# Patient Record
Sex: FEMALE | Race: WHITE | NOT HISPANIC OR LATINO | Employment: OTHER | RURAL
[De-identification: names, ages, dates, MRNs, and addresses within clinical notes are randomized per-mention and may not be internally consistent; named-entity substitution may affect disease eponyms.]

---

## 2020-10-20 RX ORDER — NYSTATIN 100000 [USP'U]/G
POWDER TOPICAL
Qty: 60 G | Refills: 1 | OUTPATIENT
Start: 2020-10-20

## 2021-12-14 NOTE — PROGRESS NOTES
Chief Complaint  Establish Care, Diabetes, Hyperlipidemia, and Hypertension    Suellen Rubin presents today for encounter to re-establish care.  She had been a previous patient of mine at [facility] practice 3 years ago.  Suellen believes her last colonoscopy was done in 2012 at [facility]. Last Mammogram was in 2012 as well. Working on retrieving these to update the chart.  Home glucose readings to range significantly, home readings since October ranged from 100-387 only 4 readings are above 200 and 2 of which were above 300 during this time.    Diabetes  She presents for her initial diabetic visit. She has type 2 diabetes mellitus. There are no hypoglycemic associated symptoms. Pertinent negatives for diabetes include no blurred vision, no chest pain and no fatigue. There are no hypoglycemic complications. There are no diabetic complications. Risk factors for coronary artery disease include diabetes mellitus, hypertension, tobacco exposure, family history, obesity and dyslipidemia. Current diabetic treatment includes oral agent (triple therapy). Her breakfast blood glucose is taken between 7-8 am. Her breakfast blood glucose range is generally 180-200 mg/dl. An ACE inhibitor/angiotensin II receptor blocker is not being taken. She does not see a podiatrist.Eye exam is current.   Hyperlipidemia  This is a chronic problem. The current episode started more than 1 year ago. Exacerbating diseases include diabetes and obesity. Pertinent negatives include no chest pain. Current antihyperlipidemic treatment includes statins. Risk factors for coronary artery disease include diabetes mellitus, obesity, family history, hypertension and dyslipidemia.   Hypertension  This is a chronic problem. The current episode started more than 1 month ago. Pertinent negatives include no blurred vision or chest pain. Risk factors for coronary artery disease include diabetes mellitus, dyslipidemia, obesity and  "post-menopausal state. Current antihypertension treatment includes angiotensin blockers.     Recent PCP stopped hydrocodone and changed to ibuprofen 800 mg bid, not as effective, add tylenol and advil 2, 200 mg tabs mid day    Current Outpatient Medications on File Prior to Visit   Medication Sig   • atorvastatin (LIPITOR) 20 MG tablet    • gabapentin (NEURONTIN) 300 MG capsule    • glimepiride (AMARYL) 4 MG tablet    • ibuprofen (ADVIL,MOTRIN) 800 MG tablet    • Januvia 50 MG tablet    • losartan (COZAAR) 25 MG tablet Take 25 mg by mouth Daily.   • metFORMIN (GLUCOPHAGE) 500 MG tablet Take 500 mg by mouth.   • [DISCONTINUED] vitamin B-12 (CYANOCOBALAMIN) 1000 MCG tablet Take 1,000 mcg by mouth Daily.     No current facility-administered medications on file prior to visit.       Objective   Vital Signs:   /84   Pulse 98   Temp 97.3 °F (36.3 °C)   Resp 18   Ht 157.5 cm (62\")   Wt 103 kg (227 lb 9.6 oz)   SpO2 97%   BMI 41.63 kg/m²       Physical Exam  Vitals and nursing note reviewed.   Constitutional:       General: She is not in acute distress.     Appearance: Normal appearance. She is well-developed. She is obese.   HENT:      Head: Normocephalic and atraumatic.   Eyes:      Conjunctiva/sclera: Conjunctivae normal.      Pupils: Pupils are equal, round, and reactive to light.   Neck:      Thyroid: No thyromegaly.      Vascular: No JVD.   Cardiovascular:      Rate and Rhythm: Normal rate and regular rhythm.      Heart sounds: Normal heart sounds. No murmur heard.      Pulmonary:      Breath sounds: Normal breath sounds. No wheezing or rales.   Musculoskeletal:         General: Normal range of motion.      Cervical back: Normal range of motion.   Lymphadenopathy:      Cervical: No cervical adenopathy.   Skin:     General: Skin is warm and dry.      Findings: No rash.   Neurological:      Mental Status: She is alert and oriented to person, place, and time.   Psychiatric:         Mood and Affect: Mood " normal.         Behavior: Behavior normal.            Office Visit on 12/15/2021   Component Date Value Ref Range Status   • Hemoglobin A1C 12/15/2021 7.8  % Final   • Lot Number 12/15/2021 NA   Final   • Expiration Date 12/15/2021 NA   Final   • Glucose 12/15/2021 185* 70 - 130 mg/dL Final   • Microalbumin, Urine 12/15/2021 50   Final   • Color 12/15/2021 Yellow  Yellow, Straw, Dark Yellow, Devika Final   • Clarity, UA 12/15/2021 Clear  Clear Final   • Glucose, UA 12/15/2021 Negative  Negative, 1000 mg/dL (3+) mg/dL Final   • Bilirubin 12/15/2021 Negative  Negative Final   • Ketones, UA 12/15/2021 Negative  Negative Final   • Specific Gravity  12/15/2021 1.010  1.005 - 1.030 Final   • Blood, UA 12/15/2021 Negative  Negative Final   • pH, Urine 12/15/2021 50.0* 5.0 - 8.0 Final   • Protein, POC 12/15/2021 Trace* Negative mg/dL Final   • Urobilinogen, UA 12/15/2021 Normal  Normal Final   • Leukocytes 12/15/2021 Moderate (2+)* Negative Final   • Nitrite, UA 12/15/2021 Negative  Negative Final             Lab Results   Component Value Date    HGBA1C 7.8 12/15/2021                Assessment and Plan {CC Problem List  Visit Diagnosis  ROS  Review (Popup)  Health Maintenance  Quality  BestPractice  Medications  SmartSets  SnapShot Encounters  Media :23}   Diagnoses and all orders for this visit:    1. Encounter to establish care (Primary)    2. Type 2 diabetes mellitus with hyperglycemia, without long-term current use of insulin (HCC)  -     POC Glycosylated Hemoglobin (Hb A1C)  -     POCT Glucose  -     POCT microalbumin  -     POCT urinalysis dipstick, manual  -     glucose blood (Accu-Chek Guide) test strip; Use as instructed  Dispense: 50 each; Refill: 12    3. Mixed hyperlipidemia  -     Lipid Panel    4. Hypertension, essential  -     Comprehensive Metabolic Panel  -     Lipid Panel  -     CBC & Differential    5. Encounter for screening mammogram for malignant neoplasm of breast  -     Mammo Screening  Bilateral With CAD; Future    6. Postmenopause  -     DEXA Bone Density Axial; Future    7. Chronic midline low back pain without sciatica    8. DDD (degenerative disc disease), lumbar    9. Knee arthropathy    10. B12 deficiency  -     vitamin B-12 (CYANOCOBALAMIN) 1000 MCG tablet; Take 1 tablet by mouth Daily.  Dispense: 30 tablet; Refill: 12  -     Vitamin B12    Diabetes good control continue current medications  Hypertension at goal continue current medications  Hyperlipidemia 20 mg daily, checking lipid panel and will adjust if needed  Strongly encouraged to get Covid vaccination  Chronic pain from lumbar degenerative disc disease and knee arthritis patient is currently taking high-dose ibuprofen on a daily basis, checking renal function advised to use as little as necessary to control pain due to gastrointestinal and renal risk.      Medications Discontinued During This Encounter   Medication Reason   • vitamin B-12 (CYANOCOBALAMIN) 1000 MCG tablet Reorder         Follow Up     Return for Medicare Wellness and 3 months for f/u on diabetes.    Patient was given instructions and counseling regarding her condition or for health maintenance advice. Please see specific information pulled into the AVS if appropriate.

## 2021-12-15 ENCOUNTER — OFFICE VISIT (OUTPATIENT)
Dept: FAMILY MEDICINE CLINIC | Facility: CLINIC | Age: 66
End: 2021-12-15

## 2021-12-15 VITALS
HEIGHT: 62 IN | SYSTOLIC BLOOD PRESSURE: 150 MMHG | OXYGEN SATURATION: 97 % | HEART RATE: 98 BPM | TEMPERATURE: 97.3 F | BODY MASS INDEX: 41.88 KG/M2 | WEIGHT: 227.6 LBS | RESPIRATION RATE: 18 BRPM | DIASTOLIC BLOOD PRESSURE: 84 MMHG

## 2021-12-15 DIAGNOSIS — M17.10 KNEE ARTHROPATHY: ICD-10-CM

## 2021-12-15 DIAGNOSIS — G89.29 CHRONIC MIDLINE LOW BACK PAIN WITHOUT SCIATICA: ICD-10-CM

## 2021-12-15 DIAGNOSIS — E78.2 MIXED HYPERLIPIDEMIA: ICD-10-CM

## 2021-12-15 DIAGNOSIS — I10 HYPERTENSION, ESSENTIAL: ICD-10-CM

## 2021-12-15 DIAGNOSIS — E53.8 B12 DEFICIENCY: ICD-10-CM

## 2021-12-15 DIAGNOSIS — M51.36 DDD (DEGENERATIVE DISC DISEASE), LUMBAR: ICD-10-CM

## 2021-12-15 DIAGNOSIS — Z78.0 POSTMENOPAUSE: ICD-10-CM

## 2021-12-15 DIAGNOSIS — E11.65 TYPE 2 DIABETES MELLITUS WITH HYPERGLYCEMIA, WITHOUT LONG-TERM CURRENT USE OF INSULIN (HCC): ICD-10-CM

## 2021-12-15 DIAGNOSIS — M54.50 CHRONIC MIDLINE LOW BACK PAIN WITHOUT SCIATICA: ICD-10-CM

## 2021-12-15 DIAGNOSIS — Z76.89 ENCOUNTER TO ESTABLISH CARE: Primary | ICD-10-CM

## 2021-12-15 DIAGNOSIS — Z12.31 ENCOUNTER FOR SCREENING MAMMOGRAM FOR MALIGNANT NEOPLASM OF BREAST: ICD-10-CM

## 2021-12-15 PROBLEM — K11.8 PAROTID MASS: Status: ACTIVE | Noted: 2018-04-18

## 2021-12-15 LAB
BILIRUB BLD-MCNC: NEGATIVE MG/DL
CLARITY, POC: CLEAR
COLOR UR: YELLOW
EXPIRATION DATE: NORMAL
GLUCOSE BLDC GLUCOMTR-MCNC: 185 MG/DL (ref 70–130)
GLUCOSE UR STRIP-MCNC: NEGATIVE MG/DL
HBA1C MFR BLD: 7.8 %
KETONES UR QL: NEGATIVE
LEUKOCYTE EST, POC: ABNORMAL
Lab: NORMAL
NITRITE UR-MCNC: NEGATIVE MG/ML
PH UR: 50 [PH] (ref 5–8)
POC MICROALBUMIN URINE: 50
PROT UR STRIP-MCNC: ABNORMAL MG/DL
RBC # UR STRIP: NEGATIVE /UL
SP GR UR: 1.01 (ref 1–1.03)
UROBILINOGEN UR QL: NORMAL

## 2021-12-15 PROCEDURE — 3062F POS MACROALBUMINURIA REV: CPT | Performed by: FAMILY MEDICINE

## 2021-12-15 PROCEDURE — 82962 GLUCOSE BLOOD TEST: CPT | Performed by: FAMILY MEDICINE

## 2021-12-15 PROCEDURE — 83036 HEMOGLOBIN GLYCOSYLATED A1C: CPT | Performed by: FAMILY MEDICINE

## 2021-12-15 PROCEDURE — 99204 OFFICE O/P NEW MOD 45 MIN: CPT | Performed by: FAMILY MEDICINE

## 2021-12-15 PROCEDURE — 81002 URINALYSIS NONAUTO W/O SCOPE: CPT | Performed by: FAMILY MEDICINE

## 2021-12-15 PROCEDURE — 82044 UR ALBUMIN SEMIQUANTITATIVE: CPT | Performed by: FAMILY MEDICINE

## 2021-12-15 PROCEDURE — 3044F HG A1C LEVEL LT 7.0%: CPT | Performed by: FAMILY MEDICINE

## 2021-12-15 RX ORDER — GLIMEPIRIDE 4 MG/1
TABLET ORAL
COMMUNITY
Start: 2021-10-28 | End: 2023-03-13 | Stop reason: SDUPTHER

## 2021-12-15 RX ORDER — SITAGLIPTIN 50 MG/1
TABLET, FILM COATED ORAL
COMMUNITY
Start: 2021-11-16 | End: 2022-03-15 | Stop reason: DRUGHIGH

## 2021-12-15 RX ORDER — BLOOD SUGAR DIAGNOSTIC
STRIP MISCELLANEOUS
Qty: 50 EACH | Refills: 12 | Status: SHIPPED | OUTPATIENT
Start: 2021-12-15 | End: 2022-12-30

## 2021-12-15 RX ORDER — LANOLIN ALCOHOL/MO/W.PET/CERES
1000 CREAM (GRAM) TOPICAL DAILY
COMMUNITY
End: 2021-12-15 | Stop reason: SDUPTHER

## 2021-12-15 RX ORDER — LOSARTAN POTASSIUM 25 MG/1
25 TABLET ORAL DAILY
COMMUNITY
End: 2021-12-20 | Stop reason: SDUPTHER

## 2021-12-15 RX ORDER — ATORVASTATIN CALCIUM 20 MG/1
TABLET, FILM COATED ORAL
COMMUNITY
Start: 2021-11-25

## 2021-12-15 RX ORDER — IBUPROFEN 800 MG/1
TABLET ORAL
COMMUNITY
Start: 2021-11-23 | End: 2022-12-12

## 2021-12-15 RX ORDER — GABAPENTIN 300 MG/1
CAPSULE ORAL
COMMUNITY
Start: 2021-11-16 | End: 2023-03-13 | Stop reason: SDUPTHER

## 2021-12-15 RX ORDER — LANOLIN ALCOHOL/MO/W.PET/CERES
1000 CREAM (GRAM) TOPICAL DAILY
Qty: 30 TABLET | Refills: 12 | Status: SHIPPED | OUTPATIENT
Start: 2021-12-15 | End: 2022-12-30

## 2021-12-15 NOTE — PATIENT INSTRUCTIONS
Chronic Pain, Adult  Chronic pain is a type of pain that lasts or keeps coming back for at least 3-6 months. You may have headaches, pain in the abdomen, or pain in other areas of the body. Chronic pain may be related to an illness, such as fibromyalgia or complex regional pain syndrome. Chronic pain may also be related to an injury or a health condition. Sometimes, the cause of chronic pain is not known.  Chronic pain can make it hard for you to do daily activities. If not treated, chronic pain can lead to anxiety and depression. Treatment depends on the cause and severity of your pain. You may need to work with a pain specialist to come up with a treatment plan. The plan may include medicine, counseling, and physical therapy. Many people benefit from a combination of two or more types of treatment to control their pain.  Follow these instructions at home:  Medicines  · Take over-the-counter and prescription medicines only as told by your health care provider.  · Ask your health care provider if the medicine prescribed to you:  ? Requires you to avoid driving or using machinery.  ? Can cause constipation. You may need to take these actions to prevent or treat constipation:  § Drink enough fluid to keep your urine pale yellow.  § Take over-the-counter or prescription medicines.  § Eat foods that are high in fiber, such as beans, whole grains, and fresh fruits and vegetables.  § Limit foods that are high in fat and processed sugars, such as fried or sweet foods.  Treatment plan  Follow your treatment plan as told by your health care provider. This may include:  · Gentle, regular exercise.  · Eating a healthy diet that includes foods such as vegetables, fruits, fish, and lean meats.  · Cognitive or behavioral therapy that changes the way you think or act in response to the pain. This may help improve how you feel.  · Working with a physical therapist.  · Meditation, yoga, acupuncture, or massage therapy.  · Aroma,  color, light, or sound therapy.  · Local electrical stimulation. The electrical pulses help to relieve pain by temporarily stopping the nerve impulses that cause you to feel pain.  · Injections. These deliver numbing or pain-relieving medicines into the spine or the area of pain.    Lifestyle    · Ask your health care provider whether you should keep a pain diary. Your health care provider will tell you what information to write in the diary. This may include when you have pain, what the pain feels like, and how medicines and other behaviors or treatments help to reduce the pain.  · Consider talking with a mental health care provider about how to manage chronic pain.  · Consider joining a chronic pain support group.  · Try to control or lower your stress levels. Talk with your health care provider about ways to do this.    General instructions  · Learn as much as you can about how to manage your chronic pain. Ask your health care provider if an intensive pain rehabilitation program or a chronic pain specialist would be helpful.  · Check your pain level as told by your health care provider. Ask your health care provider if you should use a pain scale.  · It is up to you to get the results of any tests that were done. Ask your health care provider, or the department that is doing the tests, when your results will be ready.  · Keep all follow-up visits as told by your health care provider. This is important.  Contact a health care provider if:  · Your pain gets worse, or you have new pain.  · You have trouble sleeping.  · You have trouble doing your normal activities.  · Your pain is not controlled with treatment.  · You have side effects from pain medicine.  · You feel weak.  · You notice any other changes that show that your condition is getting worse.  Get help right away if:  · You lose feeling or have numbness in your body.  · You lose control of bowel or bladder function.  · Your pain suddenly gets much  worse.  · You develop shaking or chills.  · You develop confusion.  · You develop chest pain.  · You have trouble breathing or shortness of breath.  · You pass out.  · You have thoughts about hurting yourself or others.  If you ever feel like you may hurt yourself or others, or have thoughts about taking your own life, get help right away. Go to your nearest emergency department or:  · Call your local emergency services (361 in the [place]  · Call a suicide crisis helpline, such as the National Suicide Prevention Lifeline at 1-248.588.4813. This is open 24 hours a day in the [place]  · Text the Crisis Text Line at 271309 (in the [place]  Summary  · Chronic pain is a type of pain that lasts or keeps coming back for at least 3-6 months.  · Chronic pain may be related to an illness, injury, or other health condition. Sometimes, the cause of chronic pain is not known.  · Treatment depends on the cause and severity of your pain.  · Many people benefit from a combination of two or more types of treatment to control their pain.  · Follow your treatment plan as told by your health care provider.  This information is not intended to replace advice given to you by your health care provider. Make sure you discuss any questions you have with your health care provider.  Document Revised: 09/03/2020 Document Reviewed: 09/03/2020  [employer] Patient Education © 2021 [employer]    Spondylolysis Rehab  Ask your health care provider which exercises are safe for you. Do exercises exactly as told by your health care provider and adjust them as directed. It is normal to feel mild stretching, pulling, tightness, or discomfort as you do these exercises. Stop right away if you feel sudden pain or your pain gets worse. Do not begin these exercises until told by your health care provider.  Stretching and range-of-motion exercises  These exercises warm up your muscles and joints and improve the movement and flexibility of your hips and your back.  These exercises may also help to relieve pain, numbness, and tingling.  Single knee to chest    1. Lie on your back on a firm surface with both legs straight.  2. Bend one of your knees. Use your hands to move your knee up toward your chest until you feel a gentle stretch in your lower back and buttock.  ? Hold your leg in this position by holding on to the front of your knee.  ? Keep your other leg as straight as possible.  3. Hold for __________ seconds.  4. Slowly return to the starting position.  5. Repeat this exercise with your other leg.  Repeat __________ times. Complete this exercise __________ times a day.  Hamstring stretch, supine    1. Lie on your back (supine position).  2. Loop a belt or towel over the ball of your left / right foot. The ball of your foot is on the walking surface, right under your toes.  3. Straighten your left / right knee and slowly pull on the belt or towel to raise your leg. Raise your leg until you feel a gentle stretch behind your knee or thigh (hamstring).  ? Do not let your left / right knee bend while you do this.  ? Keep your other leg flat on the floor.  4. Hold this position for __________ seconds.  5. Slowly return your leg to the starting position.  6. Repeat this exercise with your other leg.  Repeat __________ times. Complete this exercise __________ times a day.  Strengthening exercises  These exercises build strength and endurance in your back. Endurance is the ability to use your muscles for a long time, even after they get tired.  Pelvic tilt  This exercise strengthens the muscles that lie deep in the abdomen.  1. Lie on your back on a firm bed or the floor. Bend your knees and keep your feet flat.  2. Tense your abdominal muscles. Tip your pelvis up toward the ceiling and flatten your lower back into the floor.  ? To help with this exercise, you may place a small towel under your lower back and try to push your back into the towel.  3. Hold for __________  seconds.  4. Let your muscles relax completely before you repeat this exercise.  Repeat __________ times. Complete this exercise __________ times a day.  Abdominal crunch    1. Lie on your back on a firm surface. Bend your knees and keep your feet flat. Cross your arms over your chest.  2. Tuck your chin down toward your chest, without bending your neck.  3. Use your abdominal muscles to lift your upper body off the ground, straight up into the air.  ? Try to lift yourself until your shoulder blades are off the ground. You may need to work up to this.  ? Keep your lower back on the ground while you crunch upward.  ? Do not hold your breath.  4. Slowly lower yourself down. Keep your abdominal muscles tense until you are back to the starting position.  Repeat __________ times. Complete this exercise __________ times a day.  Alternating arm and leg raises    1. Get on your hands and knees on a firm surface. If you are on a hard floor, you may want to use padding, such as an exercise mat, to cushion your knees.  2. Line up your arms and legs. Your hands should be directly below your shoulders, and your knees should be directly below your hips.  3. Lift your left leg behind you. At the same time, raise your right arm and straighten it in front of you.  ? Do not lift your leg higher than your hip.  ? Do not lift your arm higher than your shoulder.  ? Keep your abdominal and back muscles tight.  ? Keep your hips facing the ground.  ? Do not arch your back.  ? Keep your balance carefully, and do not hold your breath.  4. Hold for __________ seconds.  5. Slowly return to the starting position.  6. Repeat with your right leg and your left arm.  Repeat __________ times. Complete this exercise __________ times a day.  Posture and body mechanics  Good posture and healthy body mechanics can help to relieve stress in your body's tissues and joints. Body mechanics refers to the movements and positions of your body while you do your  daily activities. Posture is part of body mechanics. Good posture means:  · Your spine is in its natural S-curve position (neutral).  · Your shoulders are pulled back slightly.  · Your head is not tipped forward.  Follow these guidelines to improve your posture and body mechanics in your everyday activities.  Standing    · When standing, keep your spine neutral and your feet about hip width apart. Keep a slight bend in your knees. Your ears, shoulders, and hips should line up.  · When you do a task in which you  one place for a long time, place one foot up on a stable object that is 2-4 inches (5-10 cm) high, such as a footstool. This helps keep your spine neutral.    Sitting    · When sitting, keep your spine neutral and keep your feet flat on the floor. Use a footrest, if necessary, and keep your thighs parallel to the floor. Avoid rounding your shoulders, and avoid tilting your head forward.  · When working at a desk or a computer, keep your desk at a height where your hands are slightly lower than your elbows. Slide your chair under your desk so you are close enough to maintain good posture.  · When working at a computer, place your monitor at a height where you are looking straight ahead and you do not have to tilt your head forward or downward to look at the screen.    Resting    · When lying down and resting, avoid positions that are most painful for you.  · If you have pain with activities such as sitting, bending, stooping, or squatting (flexion-based activities), lie in a position in which your body does not bend very much. For example, avoid curling up on your side with your arms and knees near your chest (fetal position).  · If you have pain with activities such as standing for a long time or reaching with your arms (extension-based activities), lie with your spine in a neutral position and bend your knees slightly. Try the following positions:  ? Lying on your side with a pillow between your  knees.  ? Lying on your back with a pillow under your knees.    Lifting    · When lifting objects, keep your feet at least shoulder width apart and tighten your abdominal muscles.  · Bend your knees and hips and keep your spine neutral. It is important to lift using the strength of your legs, not your back. Do not lock your knees straight out.  · Always ask for help to lift heavy or awkward objects.    This information is not intended to replace advice given to you by your health care provider. Make sure you discuss any questions you have with your health care provider.  Document Revised: 04/13/2020 Document Reviewed: 04/13/2020  [employer] Patient Education © 2021 [employer]

## 2021-12-16 LAB
ALBUMIN SERPL-MCNC: 4.1 G/DL (ref 3.8–4.8)
ALBUMIN/GLOB SERPL: 1.2 {RATIO} (ref 1.2–2.2)
ALP SERPL-CCNC: 131 IU/L (ref 44–121)
ALT SERPL-CCNC: 39 IU/L (ref 0–32)
AST SERPL-CCNC: 44 IU/L (ref 0–40)
BASOPHILS # BLD AUTO: 0.1 X10E3/UL (ref 0–0.2)
BASOPHILS NFR BLD AUTO: 1 %
BILIRUB SERPL-MCNC: 0.3 MG/DL (ref 0–1.2)
BUN SERPL-MCNC: 14 MG/DL (ref 8–27)
BUN/CREAT SERPL: 17 (ref 12–28)
CALCIUM SERPL-MCNC: 9.1 MG/DL (ref 8.7–10.3)
CHLORIDE SERPL-SCNC: 101 MMOL/L (ref 96–106)
CHOLEST SERPL-MCNC: 147 MG/DL (ref 100–199)
CO2 SERPL-SCNC: 21 MMOL/L (ref 20–29)
CREAT SERPL-MCNC: 0.84 MG/DL (ref 0.57–1)
EOSINOPHIL # BLD AUTO: 0.3 X10E3/UL (ref 0–0.4)
EOSINOPHIL NFR BLD AUTO: 4 %
ERYTHROCYTE [DISTWIDTH] IN BLOOD BY AUTOMATED COUNT: 12.5 % (ref 11.7–15.4)
GLOBULIN SER CALC-MCNC: 3.5 G/DL (ref 1.5–4.5)
GLUCOSE SERPL-MCNC: 170 MG/DL (ref 65–99)
HCT VFR BLD AUTO: 40.1 % (ref 34–46.6)
HDLC SERPL-MCNC: 35 MG/DL
HGB BLD-MCNC: 13.8 G/DL (ref 11.1–15.9)
IMM GRANULOCYTES # BLD AUTO: 0 X10E3/UL (ref 0–0.1)
IMM GRANULOCYTES NFR BLD AUTO: 1 %
LDLC SERPL CALC-MCNC: 82 MG/DL (ref 0–99)
LYMPHOCYTES # BLD AUTO: 1.8 X10E3/UL (ref 0.7–3.1)
LYMPHOCYTES NFR BLD AUTO: 23 %
MCH RBC QN AUTO: 30 PG (ref 26.6–33)
MCHC RBC AUTO-ENTMCNC: 34.4 G/DL (ref 31.5–35.7)
MCV RBC AUTO: 87 FL (ref 79–97)
MONOCYTES # BLD AUTO: 0.6 X10E3/UL (ref 0.1–0.9)
MONOCYTES NFR BLD AUTO: 8 %
NEUTROPHILS # BLD AUTO: 4.9 X10E3/UL (ref 1.4–7)
NEUTROPHILS NFR BLD AUTO: 63 %
PLATELET # BLD AUTO: 249 X10E3/UL (ref 150–450)
POTASSIUM SERPL-SCNC: 4.5 MMOL/L (ref 3.5–5.2)
PROT SERPL-MCNC: 7.6 G/DL (ref 6–8.5)
RBC # BLD AUTO: 4.6 X10E6/UL (ref 3.77–5.28)
SODIUM SERPL-SCNC: 137 MMOL/L (ref 134–144)
TRIGL SERPL-MCNC: 176 MG/DL (ref 0–149)
VIT B12 SERPL-MCNC: 465 PG/ML (ref 232–1245)
VLDLC SERPL CALC-MCNC: 30 MG/DL (ref 5–40)
WBC # BLD AUTO: 7.7 X10E3/UL (ref 3.4–10.8)

## 2021-12-20 ENCOUNTER — OFFICE VISIT (OUTPATIENT)
Dept: FAMILY MEDICINE CLINIC | Facility: CLINIC | Age: 66
End: 2021-12-20

## 2021-12-20 VITALS
SYSTOLIC BLOOD PRESSURE: 142 MMHG | DIASTOLIC BLOOD PRESSURE: 94 MMHG | HEIGHT: 62 IN | BODY MASS INDEX: 41.88 KG/M2 | WEIGHT: 227.6 LBS | RESPIRATION RATE: 18 BRPM | OXYGEN SATURATION: 97 %

## 2021-12-20 DIAGNOSIS — I10 HYPERTENSION, ESSENTIAL: ICD-10-CM

## 2021-12-20 DIAGNOSIS — R79.89 ELEVATED LFTS: ICD-10-CM

## 2021-12-20 DIAGNOSIS — Z00.00 MEDICARE ANNUAL WELLNESS VISIT, SUBSEQUENT: Primary | ICD-10-CM

## 2021-12-20 DIAGNOSIS — Z13.31 SCREENING FOR DEPRESSION: ICD-10-CM

## 2021-12-20 DIAGNOSIS — E66.01 MORBID (SEVERE) OBESITY DUE TO EXCESS CALORIES (HCC): ICD-10-CM

## 2021-12-20 DIAGNOSIS — E78.2 MIXED HYPERLIPIDEMIA: ICD-10-CM

## 2021-12-20 DIAGNOSIS — E11.65 TYPE 2 DIABETES MELLITUS WITH HYPERGLYCEMIA, WITHOUT LONG-TERM CURRENT USE OF INSULIN (HCC): ICD-10-CM

## 2021-12-20 PROCEDURE — 1126F AMNT PAIN NOTED NONE PRSNT: CPT | Performed by: FAMILY MEDICINE

## 2021-12-20 PROCEDURE — G0439 PPPS, SUBSEQ VISIT: HCPCS | Performed by: FAMILY MEDICINE

## 2021-12-20 PROCEDURE — 1170F FXNL STATUS ASSESSED: CPT | Performed by: FAMILY MEDICINE

## 2021-12-20 PROCEDURE — 1160F RVW MEDS BY RX/DR IN RCRD: CPT | Performed by: FAMILY MEDICINE

## 2021-12-20 PROCEDURE — 99213 OFFICE O/P EST LOW 20 MIN: CPT | Performed by: FAMILY MEDICINE

## 2021-12-20 RX ORDER — LOSARTAN POTASSIUM 50 MG/1
50 TABLET ORAL DAILY
Qty: 90 TABLET | Refills: 3 | Status: SHIPPED | OUTPATIENT
Start: 2021-12-20 | End: 2022-03-15 | Stop reason: SDUPTHER

## 2021-12-20 NOTE — PROGRESS NOTES
The ABCs of the Annual Wellness Visit  Subsequent Medicare Wellness Visit    Chief Complaint   Patient presents with   • Medicare Wellness-subsequent      Subjective    History of Present Illness:  Suellen Rubin is a 66 y.o. female who presents for a Subsequent Medicare Wellness Visit.    The following portions of the patient's history were reviewed and   updated as appropriate: allergies, current medications, past family history, past medical history, past social history, past surgical history and problem list.    Compared to one year ago, the patient feels her physical   health is the same.    Compared to one year ago, the patient feels her mental   health is the same.    Recent Hospitalizations:  She was not admitted to the hospital during the last year.       Current Medical Providers:  Patient Care Team:  Julissa Butler MD as PCP - General (Family Medicine)    Outpatient Medications Prior to Visit   Medication Sig Dispense Refill   • atorvastatin (LIPITOR) 20 MG tablet      • gabapentin (NEURONTIN) 300 MG capsule      • glimepiride (AMARYL) 4 MG tablet      • glucose blood (Accu-Chek Guide) test strip Use as instructed 50 each 12   • ibuprofen (ADVIL,MOTRIN) 800 MG tablet      • Januvia 50 MG tablet      • metFORMIN (GLUCOPHAGE) 500 MG tablet Take 500 mg by mouth.     • vitamin B-12 (CYANOCOBALAMIN) 1000 MCG tablet Take 1 tablet by mouth Daily. 30 tablet 12   • losartan (COZAAR) 25 MG tablet Take 25 mg by mouth Daily.       No facility-administered medications prior to visit.       No opioid medication identified on active medication list. I have reviewed chart for other potential  high risk medication/s and harmful drug interactions in the elderly.          Aspirin is not on active medication list.  Aspirin use is not indicated based on review of current medical condition/s. Risk of harm outweighs potential benefits.  .    Patient Active Problem List   Diagnosis   • Encounter to establish care  "  • Type 2 diabetes mellitus with hyperglycemia (HCC)   • Hypertension, essential   • Mixed hyperlipidemia   • Parotid mass   • Morbid (severe) obesity due to excess calories (HCC)   • Elevated LFTs     Advance Care Planning  Advance Directive is not on file.  ACP discussion was held with the patient during this visit. Patient does not have an advance directive, information provided.          Objective    Vitals:    12/20/21 0934 12/20/21 0935   BP: (!) 170/102 142/94   Resp: 18    SpO2: 97%    Weight: 103 kg (227 lb 9.6 oz)    Height: 157.5 cm (62\")    PainSc: 0-No pain    Recheck blood pressure was 142/94  BMI Readings from Last 1 Encounters:   12/20/21 41.63 kg/m²   BMI is above normal parameters. Recommendations include: educational material, exercise counseling and nutrition counseling    Does the patient have evidence of cognitive impairment? No  ATTENTION  What is the year: correct  What is the month of the year: correct  What is the day of the week?: correct  What is the date?: correct  MEMORY  Repeat address three times, only score third attempt: Donnell Carballo [address] [place] [address]: 6  HOW MANY ANIMALS DID THE PATIENT NAME  Verbal Fluency -- Animal Names (0-25): 22+  CLOCK DRAWING  Clock Drawing: All Correct  MEMORY RECALL  Tell me what you remember about that name and address we were repeating at the beginning: 3  ACE TOTAL SCORE  Total ACE Score - <25/30 strongly suggests cognitive impairment; <21/30 almost certainly shows dementia: 25      Physical Exam  Vitals and nursing note reviewed.   Constitutional:       General: She is not in acute distress.     Appearance: She is well-developed.   HENT:      Head: Normocephalic and atraumatic.   Cardiovascular:      Rate and Rhythm: Normal rate and regular rhythm.      Heart sounds: No murmur heard.      Pulmonary:      Effort: Pulmonary effort is normal.      Breath sounds: Normal breath sounds. No wheezing.   Musculoskeletal:         General: " Normal range of motion.        Feet:    Feet:      Right foot:      Protective Sensation: 10 sites tested. 9 sites sensed.      Skin integrity: Callus and dry skin present.      Left foot:      Protective Sensation: 10 sites tested. 10 sites sensed.      Skin integrity: Callus and dry skin present.   Skin:     General: Skin is warm and dry.      Findings: No rash.   Neurological:      Mental Status: She is alert and oriented to person, place, and time.       Lab Results   Component Value Date    CHLPL 147 12/15/2021    TRIG 176 (H) 12/15/2021    HDL 35 (L) 12/15/2021    LDL 82 12/15/2021    VLDL 30 12/15/2021    HGBA1C 7.8 12/15/2021            HEALTH RISK ASSESSMENT    Smoking Status:  Social History     Tobacco Use   Smoking Status Former Smoker   • Start date:    • Quit date:    • Years since quittin.9   Smokeless Tobacco Never Used     Alcohol Consumption:  Social History     Substance and Sexual Activity   Alcohol Use Not Currently     Fall Risk Screen:    VANNAADI Fall Risk Assessment has not been completed.    Depression Screening:  PHQ-2/PHQ-9 Depression Screening 2021   Little interest or pleasure in doing things 0   Feeling down, depressed, or hopeless 0   Trouble falling or staying asleep, or sleeping too much 0   Feeling tired or having little energy 0   Poor appetite or overeating 0   Feeling bad about yourself - or that you are a failure or have let yourself or your family down 0   Trouble concentrating on things, such as reading the newspaper or watching television 0   Moving or speaking so slowly that other people could have noticed. Or the opposite - being so fidgety or restless that you have been moving around a lot more than usual 0   Thoughts that you would be better off dead, or of hurting yourself in some way 0   Total Score 0   If you checked off any problems, how difficult have these problems made it for you to do your work, take care of things at home, or get along with other  people? Not difficult at all       Health Habits and Functional and Cognitive Screening:  Functional & Cognitive Status 12/20/2021   Do you have difficulty preparing food and eating? No   Do you have difficulty bathing yourself, getting dressed or grooming yourself? No   Do you have difficulty using the toilet? No   Do you have difficulty moving around from place to place? No   Do you have trouble with steps or getting out of a bed or a chair? No   Current Diet Well Balanced Diet   Dental Exam Up to date   Eye Exam Up to date   Exercise (times per week) 0 times per week   Current Exercises Include House Cleaning;No Regular Exercise   Do you need help using the phone?  No   Are you deaf or do you have serious difficulty hearing?  No   Do you need help with transportation? No   Do you need help shopping? No   Do you need help preparing meals?  No   Do you need help with housework?  No   Do you need help with laundry? No   Do you need help taking your medications? No   Do you need help managing money? No   Do you ever drive or ride in a car without wearing a seat belt? No   Have you felt unusual stress, anger or loneliness in the last month? No   Who do you live with? Alone   If you need help, do you have trouble finding someone available to you? No   Have you been bothered in the last four weeks by sexual problems? No   Do you have difficulty concentrating, remembering or making decisions? No       Age-appropriate Screening Schedule:  Refer to the list below for future screening recommendations based on patient's age, sex and/or medical conditions. Orders for these recommended tests are listed in the plan section. The patient has been provided with a written plan.    Health Maintenance   Topic Date Due   • ZOSTER VACCINE (1 of 2) Never done   • DXA SCAN  03/22/2020   • MAMMOGRAM  03/23/2020   • DIABETIC EYE EXAM  11/20/2021   • INFLUENZA VACCINE  12/15/2022 (Originally 8/1/2021)   • TDAP/TD VACCINES (1 - Tdap)  12/15/2022 (Originally 2/12/1974)   • HEMOGLOBIN A1C  06/15/2022   • LIPID PANEL  12/15/2022   • URINE MICROALBUMIN  12/15/2022   • DIABETIC FOOT EXAM  12/20/2022              Assessment/Plan   CMS Preventative Services Quick Reference  Risk Factors Identified During Encounter  Immunizations Discussed/Encouraged (specific Immunizations; Shingrix  Obesity/Overweight   The above risks/problems have been discussed with the patient.  Follow up actions/plans if indicated are seen below in the Assessment/Plan Section.  Pertinent information has been shared with the patient in the After Visit Summary.    Diagnoses and all orders for this visit:    1. Medicare annual wellness visit, subsequent (Primary)    2. Hypertension, essential  -     losartan (COZAAR) 50 MG tablet; Take 1 tablet by mouth Daily.  Dispense: 90 tablet; Refill: 3    3. Mixed hyperlipidemia  -     Comprehensive Metabolic Panel; Future    4. Type 2 diabetes mellitus with hyperglycemia, without long-term current use of insulin (HCC)  -     Hemoglobin A1c; Future  -     Comprehensive Metabolic Panel; Future    5. Elevated LFTs  -     Comprehensive Metabolic Panel; Future    6. Morbid (severe) obesity due to excess calories (HCC)    7. Screening for depression    The patient was counseled regarding nutrition, particularly making sure she gets adequate protein of 60 to 70 g daily, reducing calories and cholesterol, physical activity, healthy weight, injury prevention, immunizations and preventative health screenings.    Patient has already had her diabetic eye exam for the year.    Did spend approximately 2 minutes reviewing PHQ and screening for depression.    Hypertension as above goal, increasing Cozaar from 25 to 50 mg daily.  Diabetes well controlled we will recheck A1c in 3 months.  Slightly elevated liver function testing advised this is likely due to being overweight and do recommend reducing calories and increasing physical activity for weight  reduction.  We will recheck in 3 months.    Follow Up:   Return in about 3 months (around 3/15/2022) for as previously scheduled, diabetes, htn, with Labs.     An After Visit Summary and PPPS were made available to the patient.

## 2021-12-20 NOTE — PATIENT INSTRUCTIONS
Health Maintenance After Age 65  After age 65, you are at a higher risk for certain long-term diseases and infections as well as injuries from falls. Falls are a major cause of broken bones and head injuries in people who are older than age 65. Getting regular preventive care can help to keep you healthy and well. Preventive care includes getting regular testing and making lifestyle changes as recommended by your health care provider. Talk with your health care provider about:  · Which screenings and tests you should have. A screening is a test that checks for a disease when you have no symptoms.  · A diet and exercise plan that is right for you.  What should I know about screenings and tests to prevent falls?  Screening and testing are the best ways to find a health problem early. Early diagnosis and treatment give you the best chance of managing medical conditions that are common after age 65. Certain conditions and lifestyle choices may make you more likely to have a fall. Your health care provider may recommend:  · Regular vision checks. Poor vision and conditions such as cataracts can make you more likely to have a fall. If you wear glasses, make sure to get your prescription updated if your vision changes.  · Medicine review. Work with your health care provider to regularly review all of the medicines you are taking, including over-the-counter medicines. Ask your health care provider about any side effects that may make you more likely to have a fall. Tell your health care provider if any medicines that you take make you feel dizzy or sleepy.  · Osteoporosis screening. Osteoporosis is a condition that causes the bones to get weaker. This can make the bones weak and cause them to break more easily.  · Blood pressure screening. Blood pressure changes and medicines to control blood pressure can make you feel dizzy.  · Strength and balance checks. Your health care provider may recommend certain tests to check your  strength and balance while standing, walking, or changing positions.  · Foot health exam. Foot pain and numbness, as well as not wearing proper footwear, can make you more likely to have a fall.  · Depression screening. You may be more likely to have a fall if you have a fear of falling, feel emotionally low, or feel unable to do activities that you used to do.  · Alcohol use screening. Using too much alcohol can affect your balance and may make you more likely to have a fall.  What actions can I take to lower my risk of falls?  General instructions  · Talk with your health care provider about your risks for falling. Tell your health care provider if:  ? You fall. Be sure to tell your health care provider about all falls, even ones that seem minor.  ? You feel dizzy, sleepy, or off-balance.  · Take over-the-counter and prescription medicines only as told by your health care provider. These include any supplements.  · Eat a healthy diet and maintain a healthy weight. A healthy diet includes low-fat dairy products, low-fat (lean) meats, and fiber from whole grains, beans, and lots of fruits and vegetables.  Home safety  · Remove any tripping hazards, such as rugs, cords, and clutter.  · Install safety equipment such as grab bars in bathrooms and safety rails on stairs.  · Keep rooms and walkways well-lit.  Activity    · Follow a regular exercise program to stay fit. This will help you maintain your balance. Ask your health care provider what types of exercise are appropriate for you.  · If you need a cane or walker, use it as recommended by your health care provider.  · Wear supportive shoes that have nonskid soles.    Lifestyle  · Do not drink alcohol if your health care provider tells you not to drink.  · If you drink alcohol, limit how much you have:  ? 0-1 drink a day for women.  ? 0-2 drinks a day for men.  · Be aware of how much alcohol is in your drink. In the [place] one drink equals one typical bottle of beer  (12 oz), one-half glass of wine (5 oz), or one shot of hard liquor (1½ oz).  · Do not use any products that contain nicotine or tobacco, such as cigarettes and e-cigarettes. If you need help quitting, ask your health care provider.  Summary  · Having a healthy lifestyle and getting preventive care can help to protect your health and wellness after age 65.  · Screening and testing are the best way to find a health problem early and help you avoid having a fall. Early diagnosis and treatment give you the best chance for managing medical conditions that are more common for people who are older than age 65.  · Falls are a major cause of broken bones and head injuries in people who are older than age 65. Take precautions to prevent a fall at home.  · Work with your health care provider to learn what changes you can make to improve your health and wellness and to prevent falls.  This information is not intended to replace advice given to you by your health care provider. Make sure you discuss any questions you have with your health care provider.  Document Revised: 04/09/2020 Document Reviewed: 10/31/2018  [employer] Patient Education © 2021 [employer]    Calorie Counting for Weight Loss  Calories are units of energy. Your body needs a certain number of calories from food to keep going throughout the day. When you eat or drink more calories than your body needs, your body stores the extra calories mostly as fat. When you eat or drink fewer calories than your body needs, your body burns fat to get the energy it needs.  Calorie counting means keeping track of how many calories you eat and drink each day. Calorie counting can be helpful if you need to lose weight. If you eat fewer calories than your body needs, you should lose weight. Ask your health care provider what a healthy weight is for you.  For calorie counting to work, you will need to eat the right number of calories each day to lose a healthy amount of weight per  week. A dietitian can help you figure out how many calories you need in a day and will suggest ways to reach your calorie goal.  · A healthy amount of weight to lose each week is usually 1-2 lb (0.5-0.9 kg). This usually means that your daily calorie intake should be reduced by 500-750 calories.  · Eating 1,200-1,500 calories a day can help most women lose weight.  · Eating 1,500-1,800 calories a day can help most men lose weight.  What do I need to know about calorie counting?  Work with your health care provider or dietitian to determine how many calories you should get each day. To meet your daily calorie goal, you will need to:  · Find out how many calories are in each food that you would like to eat. Try to do this before you eat.  · Decide how much of the food you plan to eat.  · Keep a food log. Do this by writing down what you ate and how many calories it had.  To successfully lose weight, it is important to balance calorie counting with a healthy lifestyle that includes regular activity.  Where do I find calorie information?    The number of calories in a food can be found on a Nutrition Facts label. If a food does not have a Nutrition Facts label, try to look up the calories online or ask your dietitian for help.  Remember that calories are listed per serving. If you choose to have more than one serving of a food, you will have to multiply the calories per serving by the number of servings you plan to eat. For example, the label on a package of bread might say that a serving size is 1 slice and that there are 90 calories in a serving. If you eat 1 slice, you will have eaten 90 calories. If you eat 2 slices, you will have eaten 180 calories.  How do I keep a food log?  After each time that you eat, record the following in your food log as soon as possible:  · What you ate. Be sure to include toppings, sauces, and other extras on the food.  · How much you ate. This can be measured in cups, ounces, or number  of items.  · How many calories were in each food and drink.  · The total number of calories in the food you ate.  Keep your food log near you, such as in a pocket-sized notebook or on an rema or website on your mobile phone. Some programs will calculate calories for you and show you how many calories you have left to meet your daily goal.  What are some portion-control tips?  · Know how many calories are in a serving. This will help you know how many servings you can have of a certain food.  · Use a measuring cup to measure serving sizes. You could also try weighing out portions on a kitchen scale. With time, you will be able to estimate serving sizes for some foods.  · Take time to put servings of different foods on your favorite plates or in your favorite bowls and cups so you know what a serving looks like.  · Try not to eat straight from a food's packaging, such as from a bag or box. Eating straight from the package makes it hard to see how much you are eating and can lead to overeating. Put the amount you would like to eat in a cup or on a plate to make sure you are eating the right portion.  · Use smaller plates, glasses, and bowls for smaller portions and to prevent overeating.  · Try not to multitask. For example, avoid watching TV or using your computer while eating. If it is time to eat, sit down at a table and enjoy your food. This will help you recognize when you are full. It will also help you be more mindful of what and how much you are eating.  What are tips for following this plan?  Reading food labels  · Check the calorie count compared with the serving size. The serving size may be smaller than what you are used to eating.  · Check the source of the calories. Try to choose foods that are high in protein, fiber, and vitamins, and low in saturated fat, trans fat, and sodium.  Shopping  · Read nutrition labels while you shop. This will help you make healthy decisions about which foods to buy.  · Pay  attention to nutrition labels for low-fat or fat-free foods. These foods sometimes have the same number of calories or more calories than the full-fat versions. They also often have added sugar, starch, or salt to make up for flavor that was removed with the fat.  · Make a grocery list of lower-calorie foods and stick to it.  Cooking  · Try to cook your favorite foods in a healthier way. For example, try baking instead of frying.  · Use low-fat dairy products.  Meal planning  · Use more fruits and vegetables. One-half of your plate should be fruits and vegetables.  · Include lean proteins, such as chicken, turkey, and fish.  Lifestyle  Each week, aim to do one of the following:  · 150 minutes of moderate exercise, such as walking.  · 75 minutes of vigorous exercise, such as running.  General information  · Know how many calories are in the foods you eat most often. This will help you calculate calorie counts faster.  · Find a way of tracking calories that works for you. Get creative. Try different apps or programs if writing down calories does not work for you.  What foods should I eat?    · Eat nutritious foods. It is better to have a nutritious, high-calorie food, such as an avocado, than a food with few nutrients, such as a bag of potato chips.  · Use your calories on foods and drinks that will fill you up and will not leave you hungry soon after eating.  ? Examples of foods that fill you up are nuts and nut butters, vegetables, lean proteins, and high-fiber foods such as whole grains. High-fiber foods are foods with more than 5 g of fiber per serving.  · Pay attention to calories in drinks. Low-calorie drinks include water and unsweetened drinks.  The items listed above may not be a complete list of foods and beverages you can eat. Contact a dietitian for more information.  What foods should I limit?  Limit foods or drinks that are not good sources of vitamins, minerals, or protein or that are high in unhealthy  fats. These include:  · Candy.  · Other sweets.  · Sodas, specialty coffee drinks, alcohol, and juice.  The items listed above may not be a complete list of foods and beverages you should avoid. Contact a dietitian for more information.  How do I count calories when eating out?  · Pay attention to portions. Often, portions are much larger when eating out. Try these tips to keep portions smaller:  ? Consider sharing a meal instead of getting your own.  ? If you get your own meal, eat only half of it. Before you start eating, ask for a container and put half of your meal into it.  ? When available, consider ordering smaller portions from the menu instead of full portions.  · Pay attention to your food and drink choices. Knowing the way food is cooked and what is included with the meal can help you eat fewer calories.  ? If calories are listed on the menu, choose the lower-calorie options.  ? Choose dishes that include vegetables, fruits, whole grains, low-fat dairy products, and lean proteins.  ? Choose items that are boiled, broiled, grilled, or steamed. Avoid items that are buttered, battered, fried, or served with cream sauce. Items labeled as crispy are usually fried, unless stated otherwise.  ? Choose water, low-fat milk, unsweetened iced tea, or other drinks without added sugar. If you want an alcoholic beverage, choose a lower-calorie option, such as a glass of wine or light beer.  ? Ask for dressings, sauces, and syrups on the side. These are usually high in calories, so you should limit the amount you eat.  ? If you want a salad, choose a garden salad and ask for grilled meats. Avoid extra toppings such as malave, cheese, or fried items. Ask for the dressing on the side, or ask for olive oil and vinegar or lemon to use as dressing.  · Estimate how many servings of a food you are given. Knowing serving sizes will help you be aware of how much food you are eating at restaurants.  Where to find more  information  · Centers for Disease Control and Prevention: www.cdc.gov  · [place] Department of Agriculture: myplate.gov  Summary  · Calorie counting means keeping track of how many calories you eat and drink each day. If you eat fewer calories than your body needs, you should lose weight.  · A healthy amount of weight to lose per week is usually 1-2 lb (0.5-0.9 kg). This usually means reducing your daily calorie intake by 500-750 calories.  · The number of calories in a food can be found on a Nutrition Facts label. If a food does not have a Nutrition Facts label, try to look up the calories online or ask your dietitian for help.  · Use smaller plates, glasses, and bowls for smaller portions and to prevent overeating.  · Use your calories on foods and drinks that will fill you up and not leave you hungry shortly after a meal.  This information is not intended to replace advice given to you by your health care provider. Make sure you discuss any questions you have with your health care provider.  Document Revised: 01/28/2021 Document Reviewed: 01/28/2021  [employer] Patient Education © 2021 [employer]    Exercising to Lose Weight  Exercise is structured, repetitive physical activity to improve fitness and health. Getting regular exercise is important for everyone. It is especially important if you are overweight. Being overweight increases your risk of heart disease, stroke, diabetes, high blood pressure, and several types of cancer. Reducing your calorie intake and exercising can help you lose weight.  Exercise is usually categorized as moderate or vigorous intensity. To lose weight, most people need to do a certain amount of moderate-intensity or vigorous-intensity exercise each week.  Moderate-intensity exercise    Moderate-intensity exercise is any activity that gets you moving enough to burn at least three times more energy (calories) than if you were sitting.  Examples of moderate exercise include:  · Walking a  mile in 15 minutes.  · Doing light yard work.  · Biking at an easy pace.  Most people should get at least 150 minutes (2 hours and 30 minutes) a week of moderate-intensity exercise to maintain their body weight.  Vigorous-intensity exercise  Vigorous-intensity exercise is any activity that gets you moving enough to burn at least six times more calories than if you were sitting. When you exercise at this intensity, you should be working hard enough that you are not able to carry on a conversation.  Examples of vigorous exercise include:  · Running.  · Playing a team sport, such as football, basketball, and soccer.  · Jumping rope.  Most people should get at least 75 minutes (1 hour and 15 minutes) a week of vigorous-intensity exercise to maintain their body weight.  How can exercise affect me?  When you exercise enough to burn more calories than you eat, you lose weight. Exercise also reduces body fat and builds muscle. The more muscle you have, the more calories you burn. Exercise also:  · Improves mood.  · Reduces stress and tension.  · Improves your overall fitness, flexibility, and endurance.  · Increases bone strength.  The amount of exercise you need to lose weight depends on:  · Your age.  · The type of exercise.  · Any health conditions you have.  · Your overall physical ability.  Talk to your health care provider about how much exercise you need and what types of activities are safe for you.  What actions can I take to lose weight?  Nutrition    · Make changes to your diet as told by your health care provider or diet and nutrition specialist (dietitian). This may include:  ? Eating fewer calories.  ? Eating more protein.  ? Eating less unhealthy fats.  ? Eating a diet that includes fresh fruits and vegetables, whole grains, low-fat dairy products, and lean protein.  ? Avoiding foods with added fat, salt, and sugar.  · Drink plenty of water while you exercise to prevent dehydration or heat  stroke.    Activity  · Choose an activity that you enjoy and set realistic goals. Your health care provider can help you make an exercise plan that works for you.  · Exercise at a moderate or vigorous intensity most days of the week.  ? The intensity of exercise may vary from person to person. You can tell how intense a workout is for you by paying attention to your breathing and heartbeat. Most people will notice their breathing and heartbeat get faster with more intense exercise.  · Do resistance training twice each week, such as:  ? Push-ups.  ? Sit-ups.  ? Lifting weights.  ? Using resistance bands.  · Getting short amounts of exercise can be just as helpful as long structured periods of exercise. If you have trouble finding time to exercise, try to include exercise in your daily routine.  ? Get up, stretch, and walk around every 30 minutes throughout the day.  ? Go for a walk during your lunch break.  ? Park your car farther away from your destination.  ? If you take public transportation, get off one stop early and walk the rest of the way.  ? Make phone calls while standing up and walking around.  ? Take the stairs instead of elevators or escalators.  · Wear comfortable clothes and shoes with good support.  · Do not exercise so much that you hurt yourself, feel dizzy, or get very short of breath.  Where to find more information  · [place] Department of Health and Human Services: www.hhs.gov  · Centers for Disease Control and Prevention (CDC): www.cdc.gov  Contact a health care provider:  · Before starting a new exercise program.  · If you have questions or concerns about your weight.  · If you have a medical problem that keeps you from exercising.  Get help right away if you have any of the following while exercising:  · Injury.  · Dizziness.  · Difficulty breathing or shortness of breath that does not go away when you stop exercising.  · Chest pain.  · Rapid heartbeat.  Summary  · Being overweight increases  your risk of heart disease, stroke, diabetes, high blood pressure, and several types of cancer.  · Losing weight happens when you burn more calories than you eat.  · Reducing the amount of calories you eat in addition to getting regular moderate or vigorous exercise each week helps you lose weight.  This information is not intended to replace advice given to you by your health care provider. Make sure you discuss any questions you have with your health care provider.  Document Revised: 04/15/2021 Document Reviewed: 04/15/2021  [employer] Patient Education © 2021 [employer]

## 2022-03-15 ENCOUNTER — OFFICE VISIT (OUTPATIENT)
Dept: FAMILY MEDICINE CLINIC | Facility: CLINIC | Age: 67
End: 2022-03-15

## 2022-03-15 VITALS
RESPIRATION RATE: 12 BRPM | HEART RATE: 105 BPM | BODY MASS INDEX: 43.65 KG/M2 | DIASTOLIC BLOOD PRESSURE: 84 MMHG | SYSTOLIC BLOOD PRESSURE: 174 MMHG | HEIGHT: 62 IN | WEIGHT: 237.2 LBS | TEMPERATURE: 97.7 F | OXYGEN SATURATION: 97 %

## 2022-03-15 DIAGNOSIS — E78.2 MIXED HYPERLIPIDEMIA: ICD-10-CM

## 2022-03-15 DIAGNOSIS — I10 HYPERTENSION, ESSENTIAL: ICD-10-CM

## 2022-03-15 DIAGNOSIS — E11.65 TYPE 2 DIABETES MELLITUS WITH HYPERGLYCEMIA, WITHOUT LONG-TERM CURRENT USE OF INSULIN: Primary | ICD-10-CM

## 2022-03-15 DIAGNOSIS — E66.01 MORBID (SEVERE) OBESITY DUE TO EXCESS CALORIES: ICD-10-CM

## 2022-03-15 DIAGNOSIS — M51.36 DDD (DEGENERATIVE DISC DISEASE), LUMBAR: ICD-10-CM

## 2022-03-15 DIAGNOSIS — M54.50 ACUTE MIDLINE LOW BACK PAIN WITHOUT SCIATICA: ICD-10-CM

## 2022-03-15 LAB
BILIRUB BLD-MCNC: ABNORMAL MG/DL
CLARITY, POC: CLEAR
COLOR UR: YELLOW
EXPIRATION DATE: NORMAL
GLUCOSE UR STRIP-MCNC: NEGATIVE MG/DL
HBA1C MFR BLD: 9.8 %
KETONES UR QL: NEGATIVE
LEUKOCYTE EST, POC: NEGATIVE
Lab: 903
NITRITE UR-MCNC: NEGATIVE MG/ML
PH UR: 5 [PH] (ref 5–8)
PROT UR STRIP-MCNC: ABNORMAL MG/DL
RBC # UR STRIP: ABNORMAL /UL
SP GR UR: 1.02 (ref 1–1.03)
UROBILINOGEN UR QL: NORMAL

## 2022-03-15 PROCEDURE — 81002 URINALYSIS NONAUTO W/O SCOPE: CPT | Performed by: FAMILY MEDICINE

## 2022-03-15 PROCEDURE — 3046F HEMOGLOBIN A1C LEVEL >9.0%: CPT | Performed by: FAMILY MEDICINE

## 2022-03-15 PROCEDURE — 83036 HEMOGLOBIN GLYCOSYLATED A1C: CPT | Performed by: FAMILY MEDICINE

## 2022-03-15 PROCEDURE — 99214 OFFICE O/P EST MOD 30 MIN: CPT | Performed by: FAMILY MEDICINE

## 2022-03-15 RX ORDER — PREDNISONE 20 MG/1
40 TABLET ORAL DAILY
Qty: 10 TABLET | Refills: 0 | Status: SHIPPED | OUTPATIENT
Start: 2022-03-15 | End: 2022-05-12

## 2022-03-15 RX ORDER — HYDROCODONE BITARTRATE AND ACETAMINOPHEN 5; 325 MG/1; MG/1
1 TABLET ORAL EVERY 6 HOURS PRN
Qty: 28 TABLET | Refills: 0 | Status: SHIPPED | OUTPATIENT
Start: 2022-03-15 | End: 2022-03-15 | Stop reason: RX

## 2022-03-15 RX ORDER — HYDROCODONE BITARTRATE AND ACETAMINOPHEN 7.5; 325 MG/1; MG/1
TABLET ORAL
Qty: 28 TABLET | Refills: 0 | Status: SHIPPED | OUTPATIENT
Start: 2022-03-15 | End: 2022-05-10 | Stop reason: SDUPTHER

## 2022-03-15 RX ORDER — LOSARTAN POTASSIUM 100 MG/1
100 TABLET ORAL DAILY
Qty: 30 TABLET | Refills: 12 | Status: SHIPPED | OUTPATIENT
Start: 2022-03-15 | End: 2022-04-08

## 2022-03-15 RX ORDER — LOSARTAN POTASSIUM 25 MG/1
25 TABLET ORAL DAILY
COMMUNITY
Start: 2022-01-18 | End: 2022-03-15 | Stop reason: DRUGHIGH

## 2022-03-15 NOTE — PROGRESS NOTES
"Chief Complaint  Diabetes     History of Present Illness    Suellen Rubin presents today for diabetes follow up appt. Patient did not get her A1C completed from the lab department. Ran an A1c today and a UA. Pt. A1C resulted in a 9.8%, and the UA resulted with some abnormal findings.     Patient complains of lower back pain, states she bent over the shower on 03/11/2022 and strained or pulled something in her back which led to extreme amount of pain that has been ongoing and has not let up much. Ibuprofen not helping. Tramadol in past caused Severe nightmares.  Have used hydrocodone intermittently, believes last prescription was over 3 years ago.    Not watching sugar, drink sweat tea and juice daily.         Current Outpatient Medications on File Prior to Visit   Medication Sig   • atorvastatin (LIPITOR) 20 MG tablet    • gabapentin (NEURONTIN) 300 MG capsule    • glimepiride (AMARYL) 4 MG tablet    • glucose blood (Accu-Chek Guide) test strip Use as instructed   • ibuprofen (ADVIL,MOTRIN) 800 MG tablet    • vitamin B-12 (CYANOCOBALAMIN) 1000 MCG tablet Take 1 tablet by mouth Daily.   • [DISCONTINUED] Januvia 50 MG tablet    • [DISCONTINUED] losartan (COZAAR) 25 MG tablet Take 25 mg by mouth Daily.   • [DISCONTINUED] losartan (COZAAR) 50 MG tablet Take 1 tablet by mouth Daily.   • [DISCONTINUED] metFORMIN (GLUCOPHAGE) 500 MG tablet Take 500 mg by mouth.     No current facility-administered medications on file prior to visit.       Objective   Vital Signs:   /84   Pulse 105   Temp 97.7 °F (36.5 °C)   Resp 12   Ht 157.5 cm (62.01\")   Wt 108 kg (237 lb 3.2 oz)   SpO2 97%   BMI 43.37 kg/m²     Recheck blood pressure 165/94 large cuff  Physical Exam  Vitals and nursing note reviewed.   Constitutional:       General: She is not in acute distress.     Appearance: Normal appearance. She is well-developed. She is obese.      Comments: Does appear in pain   HENT:      Head: Normocephalic and atraumatic. "   Eyes:      Conjunctiva/sclera: Conjunctivae normal.      Pupils: Pupils are equal, round, and reactive to light.   Neck:      Thyroid: No thyromegaly.      Vascular: No JVD.   Cardiovascular:      Rate and Rhythm: Normal rate and regular rhythm.      Heart sounds: Normal heart sounds. No murmur heard.  Pulmonary:      Breath sounds: Normal breath sounds. No wheezing or rales.   Musculoskeletal:         General: Normal range of motion.      Cervical back: Normal range of motion.      Comments: Tender to palpation of low lumbar spine greatest in the midline lumbosacral paraspinous muscles as well.  Gait is slow and antalgic   Lymphadenopathy:      Cervical: No cervical adenopathy.   Skin:     General: Skin is warm and dry.      Findings: No rash.   Neurological:      Mental Status: She is alert and oriented to person, place, and time.   Psychiatric:         Mood and Affect: Mood normal.         Behavior: Behavior normal.            Office Visit on 03/15/2022   Component Date Value Ref Range Status   • Hemoglobin A1C 03/15/2022 9.8  % Final   • Lot Number 03/15/2022 [id number]   Final   • Expiration Date 03/15/2022 11/2,023   Final   • Color 03/15/2022 Yellow  Yellow, Straw, Dark Yellow, Devika Final   • Clarity, UA 03/15/2022 Clear (A) Clear Final   • Glucose, UA 03/15/2022 Negative  Negative, 1000 mg/dL (3+) mg/dL Final   • Bilirubin 03/15/2022 Small (1+) (A) Negative Final   • Ketones, UA 03/15/2022 Negative  Negative Final   • Specific Gravity  03/15/2022 1.025  1.005 - 1.030 Final   • Blood, UA 03/15/2022 3+ (A) Negative Final   • pH, Urine 03/15/2022 5.0  5.0 - 8.0 Final   • Protein, POC 03/15/2022 Trace (A) Negative mg/dL Final   • Urobilinogen, UA 03/15/2022 Normal  Normal Final   • Leukocytes 03/15/2022 Negative  Negative Final   • Nitrite, UA 03/15/2022 Negative  Negative Final             Lab Results   Component Value Date    HGBA1C 9.8 03/15/2022    HGBA1C 7.8 12/15/2021                Assessment and Plan     Diagnoses and all orders for this visit:    1. Type 2 diabetes mellitus with hyperglycemia, without long-term current use of insulin (HCC) (Primary)  -     POC Glycosylated Hemoglobin (Hb A1C)  -     POCT urinalysis dipstick, manual  -     metFORMIN (GLUCOPHAGE) 500 MG tablet; Take 1 tablet by mouth 2 (Two) Times a Day With Meals.  Dispense: 120 tablet; Refill: 12  -     SITagliptin (Januvia) 100 MG tablet; Take 1 tablet by mouth Daily.  Dispense: 30 tablet; Refill: 12    2. Hypertension, essential  -     losartan (COZAAR) 100 MG tablet; Take 1 tablet by mouth Daily.  Dispense: 30 tablet; Refill: 12    3. Mixed hyperlipidemia    4. Morbid (severe) obesity due to excess calories (HCC)    5. Acute midline low back pain without sciatica  -     predniSONE (DELTASONE) 20 MG tablet; Take 2 tablets by mouth Daily.  Dispense: 10 tablet; Refill: 0  -     Discontinue: HYDROcodone-acetaminophen (NORCO) 5-325 MG per tablet; Take 1 tablet by mouth Every 6 (Six) Hours As Needed for Moderate Pain  or Severe Pain .  Dispense: 28 tablet; Refill: 0  -     HYDROcodone-acetaminophen (NORCO) 7.5-325 MG per tablet; 1/2 to 1 tablet if needed for pain  Dispense: 28 tablet; Refill: 0    6. DDD (degenerative disc disease), lumbar  -     HYDROcodone-acetaminophen (NORCO) 7.5-325 MG per tablet; 1/2 to 1 tablet if needed for pain  Dispense: 28 tablet; Refill: 0      Hypertension with elevation in A1c, increasing Januvia from 50 to 100 mg, she agrees to cut out sugary drinks and work on reduced carbohydrate diet.    Hypertension above goal even if this is partially due to current degree of pain I do believe benefit of increasing Cozaar from 50 to 100 mg outweighs the risk of potential low blood pressure.  Did advise if get lightheaded, dizzy, woozy or feel like she could faint she will need to check her blood pressure and possibly reduce dose    Acute low back strain with history of chronic low back pain lumbar degenerative disc disease.   Inspect has been reviewed, patient has not received any prescriptions for narcotics in the past 2 years.  Prescription for prednisone and hydrocodone given today.  May use topicals, need to use anti-inflammatories sparingly to reduce risk of kidney disease with poorly controlled diabetes and hypertension.  Did advise prednisone will likely raise sugars and she be extra diligent about low sugar diet over the next week.    Addendum:  Received notification from patient's pharmacy that hydrocodone 5 mg was not available at their request sent a 7.5 mg strength.    Medications Discontinued During This Encounter   Medication Reason   • losartan (COZAAR) 25 MG tablet Dose adjustment   • Januvia 50 MG tablet Dose adjustment   • metFORMIN (GLUCOPHAGE) 500 MG tablet Reorder   • losartan (COZAAR) 50 MG tablet Reorder   • HYDROcodone-acetaminophen (NORCO) 5-325 MG per tablet Availability         Follow Up     Return in about 3 months (around 6/15/2022) for Recheck, diabetes, htn, cholesterol, with Labs.    Patient was given instructions and counseling regarding her condition or for health maintenance advice. Please see specific information pulled into the AVS if appropriate.

## 2022-03-18 ENCOUNTER — TELEPHONE (OUTPATIENT)
Dept: FAMILY MEDICINE CLINIC | Facility: CLINIC | Age: 67
End: 2022-03-18

## 2022-03-18 NOTE — TELEPHONE ENCOUNTER
Spoke with [facility] pharmacy. The hydrocodone is ready for the pt to .     Spoke with Suellen to let her know it is ready to . She knowledged understanding and had an additional request.     The pt has been experiencing a lot of indigestion and states prilosec is very expensive for her to buy OTC. She is requesting Dr. Butler sends in a prescription for her indigestion.

## 2022-03-18 NOTE — TELEPHONE ENCOUNTER
Received a copy of what sounds to be the same fax on date of appointment, 3/15/2022.  I sent in prescription for 7.5 mg hydrocodone on that date.  Please contact pharmacy and make sure has been received and patient was able to  prescription.

## 2022-03-18 NOTE — TELEPHONE ENCOUNTER
Received a fax incoming from [facility] pharmacy. Alternative requested for the medication of Hydrocodone - acetamin 5-325 mg.     The pharmacy does not have the drug in stock currently on back order.   Requesting the medication be switched to 7.5/325mg or send to another pharmacy.

## 2022-04-07 DIAGNOSIS — I10 HYPERTENSION, ESSENTIAL: ICD-10-CM

## 2022-04-08 RX ORDER — LOSARTAN POTASSIUM 100 MG/1
TABLET ORAL
Qty: 30 TABLET | Refills: 12 | Status: SHIPPED | OUTPATIENT
Start: 2022-04-08 | End: 2022-10-24

## 2022-05-10 DIAGNOSIS — M54.50 ACUTE MIDLINE LOW BACK PAIN WITHOUT SCIATICA: ICD-10-CM

## 2022-05-10 DIAGNOSIS — M51.36 DDD (DEGENERATIVE DISC DISEASE), LUMBAR: ICD-10-CM

## 2022-05-10 RX ORDER — HYDROCODONE BITARTRATE AND ACETAMINOPHEN 7.5; 325 MG/1; MG/1
TABLET ORAL
Qty: 28 TABLET | Refills: 0 | Status: SHIPPED | OUTPATIENT
Start: 2022-05-10 | End: 2022-06-20 | Stop reason: SDUPTHER

## 2022-05-10 NOTE — TELEPHONE ENCOUNTER
"PATIENT CALLED FOR MEDICATION REFILL OF     HYDROcodone-acetaminophen (NORCO) 7.5-325 MG per tablet  SHE IS OUT OF MEDICATION    [facility]/pharmacy #[id number] - [place], [place] - [address] [place] [address] [facility] - [phone]  - [phone]   [phone]    CALL BACK NUMBER [phone]  "

## 2022-05-12 ENCOUNTER — OFFICE VISIT (OUTPATIENT)
Dept: FAMILY MEDICINE CLINIC | Facility: CLINIC | Age: 67
End: 2022-05-12

## 2022-05-12 VITALS
TEMPERATURE: 97.3 F | RESPIRATION RATE: 18 BRPM | BODY MASS INDEX: 42.55 KG/M2 | OXYGEN SATURATION: 94 % | HEART RATE: 106 BPM | SYSTOLIC BLOOD PRESSURE: 146 MMHG | HEIGHT: 62 IN | DIASTOLIC BLOOD PRESSURE: 82 MMHG | WEIGHT: 231.2 LBS

## 2022-05-12 DIAGNOSIS — B02.9 HERPES ZOSTER WITHOUT COMPLICATION: Primary | ICD-10-CM

## 2022-05-12 DIAGNOSIS — M54.50 ACUTE MIDLINE LOW BACK PAIN WITHOUT SCIATICA: ICD-10-CM

## 2022-05-12 PROCEDURE — 99213 OFFICE O/P EST LOW 20 MIN: CPT | Performed by: FAMILY MEDICINE

## 2022-05-12 RX ORDER — ACYCLOVIR 800 MG/1
800 TABLET ORAL
Qty: 50 TABLET | Refills: 0 | Status: SHIPPED | OUTPATIENT
Start: 2022-05-12 | End: 2022-09-30

## 2022-05-12 RX ORDER — PREDNISONE 20 MG/1
20 TABLET ORAL DAILY
Qty: 20 TABLET | Refills: 0 | Status: SHIPPED | OUTPATIENT
Start: 2022-05-12 | End: 2022-05-25

## 2022-05-12 RX ORDER — BACLOFEN 10 MG/1
10 TABLET ORAL NIGHTLY
Qty: 30 TABLET | Refills: 0 | Status: SHIPPED | OUTPATIENT
Start: 2022-05-12 | End: 2022-06-03

## 2022-05-12 NOTE — PROGRESS NOTES
Subjective   Suellen Rubin is a 67 y.o. female.   Chief Complaint   Patient presents with   • Back Pain       Back Pain  This is a recurrent problem. The current episode started in the past 7 days. The problem occurs constantly. The quality of the pain is described as aching and shooting. The pain radiates to the left thigh. The pain is at a severity of 6/10. The symptoms are aggravated by position, lying down, twisting, standing and bending. Associated symptoms include leg pain. Pertinent negatives include no dysuria, pelvic pain or weight loss. Risk factors include obesity. Treatments tried: norco. The treatment provided mild relief.        The following portions of the patient's history were reviewed and updated as appropriate: allergies, current medications, past family history, past medical history, past social history, past surgical history and problem list.    Patient Active Problem List   Diagnosis   • Encounter to establish care   • Type 2 diabetes mellitus with hyperglycemia (HCC)   • Hypertension, essential   • Mixed hyperlipidemia   • Parotid mass   • Morbid (severe) obesity due to excess calories (HCC)   • Elevated LFTs       Current Outpatient Medications on File Prior to Visit   Medication Sig Dispense Refill   • atorvastatin (LIPITOR) 20 MG tablet      • gabapentin (NEURONTIN) 300 MG capsule      • glimepiride (AMARYL) 4 MG tablet      • glucose blood (Accu-Chek Guide) test strip Use as instructed 50 each 12   • HYDROcodone-acetaminophen (NORCO) 7.5-325 MG per tablet 1/2 to 1 tablet if needed for pain 28 tablet 0   • ibuprofen (ADVIL,MOTRIN) 800 MG tablet      • losartan (COZAAR) 100 MG tablet TAKE 1 TABLET BY MOUTH EVERY DAY 30 tablet 12   • metFORMIN (GLUCOPHAGE) 500 MG tablet Take 1 tablet by mouth 2 (Two) Times a Day With Meals. 120 tablet 12   • SITagliptin (Januvia) 100 MG tablet Take 1 tablet by mouth Daily. 30 tablet 12   • vitamin B-12 (CYANOCOBALAMIN) 1000 MCG tablet Take 1 tablet by  "mouth Daily. 30 tablet 12   • [DISCONTINUED] predniSONE (DELTASONE) 20 MG tablet Take 2 tablets by mouth Daily. 10 tablet 0     No current facility-administered medications on file prior to visit.     Current outpatient and discharge medications have been reconciled for the patient.  Reviewed by: Alfonso Willams MD      Allergies   Allergen Reactions   • Aspirin Other (See Comments)     Ringing ears       Review of Systems   Constitutional: Negative for unexpected weight loss.   Genitourinary: Negative for dysuria and pelvic pain.   Musculoskeletal: Positive for back pain and myalgias.   Skin: Positive for rash.     I have reviewed and confirmed the accuracy of the ROS as documented by the MA/LPN/RN Alfonso Willams MD    Objective   Visit Vitals  /82 (BP Location: Right arm, Patient Position: Sitting, Cuff Size: Adult)   Pulse 106   Temp 97.3 °F (36.3 °C)   Resp 18   Ht 157.5 cm (62.01\")   Wt 105 kg (231 lb 3.2 oz)   SpO2 94%   BMI 42.27 kg/m²       Physical Exam  Constitutional:       Appearance: She is well-developed.   HENT:      Head: Normocephalic and atraumatic.      Right Ear: External ear normal.      Left Ear: External ear normal.      Nose: Nose normal.   Eyes:      General: Visual field deficit: vesicular rash L3 dermatome left       Pupils: Pupils are equal, round, and reactive to light.   Cardiovascular:      Rate and Rhythm: Normal rate and regular rhythm.      Heart sounds: Normal heart sounds.   Pulmonary:      Effort: Pulmonary effort is normal.      Breath sounds: Normal breath sounds.   Abdominal:      General: Bowel sounds are normal.      Palpations: Abdomen is soft.   Musculoskeletal:         General: Normal range of motion.      Cervical back: Normal range of motion and neck supple.   Skin:     General: Skin is warm and dry.   Neurological:      Mental Status: She is alert and oriented to person, place, and time.   Psychiatric:         Behavior: Behavior normal.         " Thought Content: Thought content normal.         Judgment: Judgment normal.       Derm Physical Exam    Diagnoses and all orders for this visit:    1. Herpes zoster without complication (Primary)  Comments:  L3 dermatome   Orders:  -     acyclovir (ZOVIRAX) 800 MG tablet; Take 1 tablet by mouth 5 (Five) Times a Day.  Dispense: 50 tablet; Refill: 0  -     predniSONE (DELTASONE) 20 MG tablet; Take 1 tablet by mouth Daily for 13 days. TID x 3 days, BID x 3 days, QD x 3 days, 1/2 tab daily x 4 days  Dispense: 20 tablet; Refill: 0    2. Acute midline low back pain without sciatica  -     predniSONE (DELTASONE) 20 MG tablet; Take 1 tablet by mouth Daily for 13 days. TID x 3 days, BID x 3 days, QD x 3 days, 1/2 tab daily x 4 days  Dispense: 20 tablet; Refill: 0  -     baclofen (LIORESAL) 10 MG tablet; Take 1 tablet by mouth Every Night.  Dispense: 30 tablet; Refill: 0     Findings discussed. All questions answered.  Medication and medication adverse effects discussed.  Drug education given and explained to patient. Patient verbalized understanding.  Differential diagnosis discussed.   Follow-up in approximately 3 days for reevaluation if not improved.  Follow-up sooner for worsening symptoms or any concerns.     I wore protective equipment throughout this patient encounter to include mask and eye protection. Hand hygiene was performed before donning protective equipment and after removal when leaving the room

## 2022-06-03 DIAGNOSIS — M54.50 ACUTE MIDLINE LOW BACK PAIN WITHOUT SCIATICA: ICD-10-CM

## 2022-06-03 RX ORDER — BACLOFEN 10 MG/1
TABLET ORAL
Qty: 30 TABLET | Refills: 0 | Status: SHIPPED | OUTPATIENT
Start: 2022-06-03 | End: 2022-06-21 | Stop reason: SDUPTHER

## 2022-06-20 ENCOUNTER — OFFICE VISIT (OUTPATIENT)
Dept: FAMILY MEDICINE CLINIC | Facility: CLINIC | Age: 67
End: 2022-06-20

## 2022-06-20 VITALS
OXYGEN SATURATION: 98 % | TEMPERATURE: 96.9 F | BODY MASS INDEX: 42.14 KG/M2 | HEIGHT: 62 IN | RESPIRATION RATE: 18 BRPM | HEART RATE: 93 BPM | DIASTOLIC BLOOD PRESSURE: 82 MMHG | WEIGHT: 229 LBS | SYSTOLIC BLOOD PRESSURE: 130 MMHG

## 2022-06-20 DIAGNOSIS — E11.65 TYPE 2 DIABETES MELLITUS WITH HYPERGLYCEMIA, WITHOUT LONG-TERM CURRENT USE OF INSULIN: Primary | ICD-10-CM

## 2022-06-20 DIAGNOSIS — M54.50 ACUTE MIDLINE LOW BACK PAIN WITHOUT SCIATICA: ICD-10-CM

## 2022-06-20 DIAGNOSIS — E66.01 MORBID (SEVERE) OBESITY DUE TO EXCESS CALORIES: ICD-10-CM

## 2022-06-20 DIAGNOSIS — I10 HYPERTENSION, ESSENTIAL: ICD-10-CM

## 2022-06-20 DIAGNOSIS — M51.36 DDD (DEGENERATIVE DISC DISEASE), LUMBAR: ICD-10-CM

## 2022-06-20 DIAGNOSIS — M17.10 KNEE ARTHROPATHY: ICD-10-CM

## 2022-06-20 PROBLEM — B02.9 SHINGLES: Status: ACTIVE | Noted: 2022-06-20

## 2022-06-20 LAB
EXPIRATION DATE: NORMAL
HBA1C MFR BLD: 9.8 %
Lab: NORMAL

## 2022-06-20 PROCEDURE — 3046F HEMOGLOBIN A1C LEVEL >9.0%: CPT | Performed by: FAMILY MEDICINE

## 2022-06-20 PROCEDURE — 99214 OFFICE O/P EST MOD 30 MIN: CPT | Performed by: FAMILY MEDICINE

## 2022-06-20 PROCEDURE — 83036 HEMOGLOBIN GLYCOSYLATED A1C: CPT | Performed by: FAMILY MEDICINE

## 2022-06-20 RX ORDER — ONDANSETRON 4 MG/1
4 TABLET, FILM COATED ORAL EVERY 8 HOURS PRN
Qty: 20 TABLET | Refills: 1 | Status: SHIPPED | OUTPATIENT
Start: 2022-06-20 | End: 2022-08-30

## 2022-06-20 RX ORDER — SEMAGLUTIDE 1.34 MG/ML
0.25 INJECTION, SOLUTION SUBCUTANEOUS WEEKLY
Qty: 1 PEN | Refills: 2 | Status: SHIPPED | OUTPATIENT
Start: 2022-06-20 | End: 2022-09-30

## 2022-06-20 RX ORDER — HYDROCODONE BITARTRATE AND ACETAMINOPHEN 7.5; 325 MG/1; MG/1
TABLET ORAL
Qty: 28 TABLET | Refills: 0 | Status: SHIPPED | OUTPATIENT
Start: 2022-06-20 | End: 2022-07-26 | Stop reason: SDUPTHER

## 2022-06-20 NOTE — ASSESSMENT & PLAN NOTE
Patient's (Body mass index is 41.87 kg/m².) indicates that they are morbidly obese (BMI > 40 or > 35 with obesity - related health condition) with health conditions that include hypertension, diabetes mellitus and dyslipidemias . Weight is unchanged. BMI is is above average; BMI management plan is completed. We discussed low calorie, low carb based diet program, portion control, increasing exercise and pharmacologic options including ozempic.

## 2022-06-20 NOTE — PROGRESS NOTES
"Chief Complaint  Diabetes, Hyperlipidemia, and Hypertension     History of Present Illness  Suellen Rubin presents today for follow-up on hypertension, Mixed Hyperlipidemia, and Diabetes. Patient's last A1C was done on 3/15/22 was 9.8%.    Patient is taking Atorvastatin, Glimepiride, Metformin, Januvia, and Losartan. She still has active labs that was previously ordered.     Patient is not currently taking her blood pressure or blood sugars at home.     She is requesting refill of hydrocodone taking about 4 tablets a week average for back or knee pain .       Current Outpatient Medications on File Prior to Visit   Medication Sig   • atorvastatin (LIPITOR) 20 MG tablet    • gabapentin (NEURONTIN) 300 MG capsule    • glimepiride (AMARYL) 4 MG tablet    • glucose blood (Accu-Chek Guide) test strip Use as instructed   • losartan (COZAAR) 100 MG tablet TAKE 1 TABLET BY MOUTH EVERY DAY   • SITagliptin (Januvia) 100 MG tablet Take 1 tablet by mouth Daily.   • vitamin B-12 (CYANOCOBALAMIN) 1000 MCG tablet Take 1 tablet by mouth Daily.   • [DISCONTINUED] HYDROcodone-acetaminophen (NORCO) 7.5-325 MG per tablet 1/2 to 1 tablet if needed for pain   • [DISCONTINUED] metFORMIN (GLUCOPHAGE) 500 MG tablet Take 1 tablet by mouth 2 (Two) Times a Day With Meals.   • acyclovir (ZOVIRAX) 800 MG tablet Take 1 tablet by mouth 5 (Five) Times a Day.   • baclofen (LIORESAL) 10 MG tablet TAKE 1 TABLET BY MOUTH EVERY DAY AT NIGHT   • ibuprofen (ADVIL,MOTRIN) 800 MG tablet      No current facility-administered medications on file prior to visit.       Objective   Vital Signs:   /82   Pulse 93   Temp 96.9 °F (36.1 °C)   Resp 18   Ht 157.5 cm (62.01\")   Wt 104 kg (229 lb)   SpO2 98%   BMI 41.87 kg/m²       Physical Exam  Vitals and nursing note reviewed.   Constitutional:       General: She is not in acute distress.     Appearance: She is well-developed. She is obese.   HENT:      Head: Normocephalic and atraumatic. "   Cardiovascular:      Rate and Rhythm: Normal rate and regular rhythm.      Heart sounds: No murmur heard.  Pulmonary:      Effort: Pulmonary effort is normal.      Breath sounds: Normal breath sounds. No wheezing.   Musculoskeletal:         General: Normal range of motion.   Skin:     General: Skin is warm and dry.      Findings: No rash.   Neurological:      Mental Status: She is alert and oriented to person, place, and time.            Office Visit on 06/20/2022   Component Date Value Ref Range Status   • Hemoglobin A1C 06/20/2022 9.8  % Final   • Lot Number 06/20/2022 na   Final   • Expiration Date 06/20/2022 na   Final             Lab Results   Component Value Date    HGBA1C 9.8 06/20/2022    HGBA1C 9.8 03/15/2022    HGBA1C 7.8 12/15/2021                Assessment and Plan    Diagnoses and all orders for this visit:    1. Type 2 diabetes mellitus with hyperglycemia, without long-term current use of insulin (HCC) (Primary)  -     POC Glycosylated Hemoglobin (Hb A1C)  -     Semaglutide,0.25 or 0.5MG/DOS, (Ozempic, 0.25 or 0.5 MG/DOSE,) 2 MG/1.5ML solution pen-injector; Inject 0.25 mg under the skin into the appropriate area as directed 1 (One) Time Per Week.  Dispense: 1 pen; Refill: 2  -     metFORMIN (GLUCOPHAGE) 500 MG tablet; Take 2 tablets by mouth 2 (Two) Times a Day With Meals.  Dispense: 120 tablet; Refill: 0  -     Hemoglobin A1c; Future  -     Comprehensive Metabolic Panel; Future    2. Acute midline low back pain without sciatica  -     HYDROcodone-acetaminophen (NORCO) 7.5-325 MG per tablet; 1/2 to 1 tablet every 6 hours if needed for pain  Dispense: 28 tablet; Refill: 0    3. DDD (degenerative disc disease), lumbar  -     HYDROcodone-acetaminophen (NORCO) 7.5-325 MG per tablet; 1/2 to 1 tablet every 6 hours if needed for pain  Dispense: 28 tablet; Refill: 0    4. Hypertension, essential    5. Knee arthropathy    6. Morbid (severe) obesity due to excess calories (HCC)  Assessment & Plan:  Patient's  (Body mass index is 41.87 kg/m².) indicates that they are morbidly obese (BMI > 40 or > 35 with obesity - related health condition) with health conditions that include hypertension, diabetes mellitus and dyslipidemias . Weight is unchanged. BMI is is above average; BMI management plan is completed. We discussed low calorie, low carb based diet program, portion control, increasing exercise and pharmacologic options including ozempic.       Other orders  -     ondansetron (Zofran) 4 MG tablet; Take 1 tablet by mouth Every 8 (Eight) Hours As Needed for Nausea or Vomiting.  Dispense: 20 tablet; Refill: 1   Diabetes not improved, stressed need to cut out sugars from drinks discussed treatment options, adding Ozempic, she will check sugars more often and see if numbers are dropping may reduce metformin which is causing her some diarrhea.  Additionally would like to eventually stop Januvia due to similar mechanism of action.  Discussed side effects, advised if mild nausea is not well-tolerated use Zofran as needed, most likely will be needed the day following an injection.    BP has improved with increase in Losartan continue current dose.    Chronic degenerative arthritis with intermittent low back pain and knee pain.  Tylenol as needed with hydrocodone for more significant pain as needed.    Medications Discontinued During This Encounter   Medication Reason   • metFORMIN (GLUCOPHAGE) 500 MG tablet Reorder   • HYDROcodone-acetaminophen (NORCO) 7.5-325 MG per tablet Reorder         Follow Up     Return in about 3 months (around 9/20/2022) for Recheck, diabetes, with Labs.    Patient was given instructions and counseling regarding her condition or for health maintenance advice. Please see specific information pulled into the AVS if appropriate.

## 2022-06-21 DIAGNOSIS — M54.50 ACUTE MIDLINE LOW BACK PAIN WITHOUT SCIATICA: ICD-10-CM

## 2022-06-21 RX ORDER — BACLOFEN 10 MG/1
10 TABLET ORAL
Qty: 30 TABLET | Refills: 0 | Status: SHIPPED | OUTPATIENT
Start: 2022-06-21 | End: 2022-06-24 | Stop reason: SDUPTHER

## 2022-06-22 ENCOUNTER — TELEPHONE (OUTPATIENT)
Dept: FAMILY MEDICINE CLINIC | Facility: CLINIC | Age: 67
End: 2022-06-22

## 2022-06-24 DIAGNOSIS — M54.50 ACUTE MIDLINE LOW BACK PAIN WITHOUT SCIATICA: ICD-10-CM

## 2022-06-24 RX ORDER — BACLOFEN 10 MG/1
10 TABLET ORAL
Qty: 90 TABLET | Refills: 1 | Status: SHIPPED | OUTPATIENT
Start: 2022-06-24 | End: 2022-12-12

## 2022-07-26 DIAGNOSIS — M54.50 ACUTE MIDLINE LOW BACK PAIN WITHOUT SCIATICA: ICD-10-CM

## 2022-07-26 DIAGNOSIS — M51.36 DDD (DEGENERATIVE DISC DISEASE), LUMBAR: ICD-10-CM

## 2022-07-26 RX ORDER — NYSTATIN 100000 [USP'U]/G
POWDER TOPICAL 3 TIMES DAILY
Qty: 60 G | Refills: 3 | Status: SHIPPED | OUTPATIENT
Start: 2022-07-26 | End: 2022-09-30

## 2022-07-26 RX ORDER — HYDROCODONE BITARTRATE AND ACETAMINOPHEN 7.5; 325 MG/1; MG/1
TABLET ORAL
Qty: 28 TABLET | Refills: 0 | Status: SHIPPED | OUTPATIENT
Start: 2022-07-26 | End: 2022-08-23 | Stop reason: SDUPTHER

## 2022-07-26 NOTE — TELEPHONE ENCOUNTER
"    Caller: Suellen Rubin    Relationship: Self    Best call back number: [phone]    What medication are you requesting: NYSTATIN POWDER    What are your current symptoms:  RASH    How long have you been experiencing symptoms: A COUPLE WEEKS      Have you had these symptoms before:    [x] Yes  [] No    Have you been treated for these symptoms before:   [x] Yes  [] No    If a prescription is needed, what is your preferred pharmacy and phone number: [id number] - [place], [place] [address] AT [place] [address] [place] - [phone] [facility] [phone] FX         "

## 2022-07-26 NOTE — TELEPHONE ENCOUNTER
"Caller: Suellen Rubin    Relationship: Self    Best call back number: [phone] (H)    Requested Prescriptions:   Requested Prescriptions     Pending Prescriptions Disp Refills   • HYDROcodone-acetaminophen (NORCO) 7.5-325 MG per tablet 28 tablet 0     Si/2 to 1 tablet every 6 hours if needed for pain        Pharmacy where request should be sent: [facility]ARMACY #[id number] - [place] - [address] [facility] [place] [facility] [phone] [facility] [phone]      Additional details provided by patient: PATIENT IS COMPLETELY OUT. PLEASE ADVISE    Does the patient have less than a 3 day supply:  [x] Yes  [] No    Desiree Pearson   22 11:22 EDT       "

## 2022-08-23 DIAGNOSIS — M54.50 ACUTE MIDLINE LOW BACK PAIN WITHOUT SCIATICA: ICD-10-CM

## 2022-08-23 DIAGNOSIS — M51.36 DDD (DEGENERATIVE DISC DISEASE), LUMBAR: ICD-10-CM

## 2022-08-23 RX ORDER — HYDROCODONE BITARTRATE AND ACETAMINOPHEN 7.5; 325 MG/1; MG/1
TABLET ORAL
Qty: 28 TABLET | Refills: 0 | Status: SHIPPED | OUTPATIENT
Start: 2022-08-23 | End: 2022-09-21 | Stop reason: SDUPTHER

## 2022-08-23 NOTE — TELEPHONE ENCOUNTER
"Caller: Suellen Rubin    Relationship: Self    Best call back number: [phone]    Requested Prescriptions:   Requested Prescriptions     Pending Prescriptions Disp Refills   • HYDROcodone-acetaminophen (NORCO) 7.5-325 MG per tablet 28 tablet 0     Si/2 to 1 tablet every 6 hours if needed for pain        Pharmacy where request should be sent: [facility]/PHARMACY #[id number] - [place] - [address] AT [place] [address] [facility] [phone] [facility] [phone] FX     Additional details provided by patient: PATIENT HAS 1 TABLET LEFT.    PLEASE CONTACT PATIENT IF NEEDED     Does the patient have less than a 3 day supply:  [x] Yes  [] No    Desiree PEREZ Rep   22 12:37 EDT             "

## 2022-08-24 DIAGNOSIS — E11.65 TYPE 2 DIABETES MELLITUS WITH HYPERGLYCEMIA, WITHOUT LONG-TERM CURRENT USE OF INSULIN: ICD-10-CM

## 2022-08-24 NOTE — TELEPHONE ENCOUNTER
"Caller: Suellen Rubin    Relationship: Self    Best call back number: [phone] (H)    Requested Prescriptions:   Requested Prescriptions     Pending Prescriptions Disp Refills   • metFORMIN (GLUCOPHAGE) 500 MG tablet 120 tablet 0     Sig: Take 2 tablets by mouth 2 (Two) Times a Day With Meals.        Pharmacy where request should be sent: [facility] #[id number] - [place] [facility] [facility] [phone] [facility] [phone]      Additional details provided by patient: COMPLETELY OUT OF MEDICATION     Does the patient have less than a 3 day supply:  [x] Yes  [] No    Desiree BARNARD Rep   08/24/22 09:03 EDT       "

## 2022-08-26 DIAGNOSIS — E11.65 TYPE 2 DIABETES MELLITUS WITH HYPERGLYCEMIA, WITHOUT LONG-TERM CURRENT USE OF INSULIN: ICD-10-CM

## 2022-08-26 NOTE — TELEPHONE ENCOUNTER
"Caller: Suellen Rubin    Relationship: Self    Best call back number: [phone]    Requested Prescriptions:   Requested Prescriptions     Pending Prescriptions Disp Refills   • metFORMIN (GLUCOPHAGE) 500 MG tablet 120 tablet 0     Sig: Take 2 tablets by mouth 2 (Two) Times a Day With Meals.        Pharmacy where request should be sent: [facility] #[id number] - [place] - [address] [facility] [place] [facility] [phone] [facility] [phone]      Additional details provided by patient: PATIENT STATES SHE IS COMPLETLEY OUT OF THIS MEDICATION.     Does the patient have less than a 3 day supply:  [x] Yes  [] No    Desiree Edward Rep   08/26/22 10:40 EDT             "

## 2022-08-30 DIAGNOSIS — E11.65 TYPE 2 DIABETES MELLITUS WITH HYPERGLYCEMIA, WITHOUT LONG-TERM CURRENT USE OF INSULIN: ICD-10-CM

## 2022-08-30 RX ORDER — ONDANSETRON 4 MG/1
TABLET, FILM COATED ORAL
Qty: 20 TABLET | Refills: 1 | Status: SHIPPED | OUTPATIENT
Start: 2022-08-30

## 2022-09-21 DIAGNOSIS — M51.36 DDD (DEGENERATIVE DISC DISEASE), LUMBAR: ICD-10-CM

## 2022-09-21 DIAGNOSIS — M54.50 ACUTE MIDLINE LOW BACK PAIN WITHOUT SCIATICA: ICD-10-CM

## 2022-09-21 RX ORDER — HYDROCODONE BITARTRATE AND ACETAMINOPHEN 7.5; 325 MG/1; MG/1
TABLET ORAL
Qty: 28 TABLET | Refills: 0 | Status: SHIPPED | OUTPATIENT
Start: 2022-09-21 | End: 2022-10-10 | Stop reason: SDUPTHER

## 2022-09-21 NOTE — TELEPHONE ENCOUNTER
"  Caller: Suellen Rubin    Relationship: Self    Best call back number:[phone]   Requested Prescriptions:   Requested Prescriptions     Pending Prescriptions Disp Refills   • HYDROcodone-acetaminophen (NORCO) 7.5-325 MG per tablet 28 tablet 0     Si/2 to 1 tablet every 6 hours if needed for pain        Pharmacy where request should be sent: [facility]/PHARMACY #[id number] - [place], [place] - [address] AT [place] [address] [facility] [phone] [facility] [phone]      Additional details provided by patient: 1 DAY OF MEDICATION REMAINING     Does the patient have less than a 3 day supply:  [x] Yes  [] No    Alexandra Nation   22 11:18 EDT           "

## 2022-09-25 DIAGNOSIS — E11.65 TYPE 2 DIABETES MELLITUS WITH HYPERGLYCEMIA, WITHOUT LONG-TERM CURRENT USE OF INSULIN: ICD-10-CM

## 2022-09-30 ENCOUNTER — OFFICE VISIT (OUTPATIENT)
Dept: FAMILY MEDICINE CLINIC | Facility: CLINIC | Age: 67
End: 2022-09-30

## 2022-09-30 VITALS
DIASTOLIC BLOOD PRESSURE: 76 MMHG | HEIGHT: 62 IN | OXYGEN SATURATION: 98 % | TEMPERATURE: 97.8 F | BODY MASS INDEX: 39.27 KG/M2 | HEART RATE: 95 BPM | SYSTOLIC BLOOD PRESSURE: 132 MMHG | RESPIRATION RATE: 18 BRPM | WEIGHT: 213.38 LBS

## 2022-09-30 DIAGNOSIS — E78.2 MIXED HYPERLIPIDEMIA: ICD-10-CM

## 2022-09-30 DIAGNOSIS — L29.9 PRURITIC CONDITION: ICD-10-CM

## 2022-09-30 DIAGNOSIS — E11.65 TYPE 2 DIABETES MELLITUS WITH HYPERGLYCEMIA, WITHOUT LONG-TERM CURRENT USE OF INSULIN: Primary | ICD-10-CM

## 2022-09-30 DIAGNOSIS — E66.01 MORBID (SEVERE) OBESITY DUE TO EXCESS CALORIES: ICD-10-CM

## 2022-09-30 DIAGNOSIS — L60.3 NAIL BREAKING: ICD-10-CM

## 2022-09-30 DIAGNOSIS — I10 HYPERTENSION, ESSENTIAL: ICD-10-CM

## 2022-09-30 DIAGNOSIS — R79.89 ELEVATED LFTS: ICD-10-CM

## 2022-09-30 LAB
EXPIRATION DATE: NORMAL
HBA1C MFR BLD: 7.4 %
Lab: NORMAL

## 2022-09-30 PROCEDURE — 99214 OFFICE O/P EST MOD 30 MIN: CPT | Performed by: FAMILY MEDICINE

## 2022-09-30 PROCEDURE — 83036 HEMOGLOBIN GLYCOSYLATED A1C: CPT | Performed by: FAMILY MEDICINE

## 2022-09-30 PROCEDURE — 3051F HG A1C>EQUAL 7.0%<8.0%: CPT | Performed by: FAMILY MEDICINE

## 2022-09-30 RX ORDER — ACETYLCYSTEINE 600 MG
2 CAPSULE ORAL 2 TIMES DAILY
Qty: 120 CAPSULE | Refills: 3 | Status: SHIPPED | OUTPATIENT
Start: 2022-09-30 | End: 2022-12-12

## 2022-09-30 RX ORDER — HYDROXYZINE HYDROCHLORIDE 25 MG/1
25 TABLET, FILM COATED ORAL 3 TIMES DAILY PRN
Qty: 30 TABLET | Refills: 2 | Status: SHIPPED | OUTPATIENT
Start: 2022-09-30 | End: 2023-03-13

## 2022-09-30 NOTE — PROGRESS NOTES
Chief Complaint  Diabetes     History of Present Illness    Suellen Rubin presents today for diabetes follow up.   Patient does not check blood sugar daily.  Patient has cut out nearly all concentrated sugars.  Ozempic was to expensive.   Patient is due for diabetic eye exam.     Requesting medication for skin itching, itch in random places at randome times    A1C Last 3 Results    HGBA1C Last 3 Results 3/15/22 6/20/22 9/30/22   Hemoglobin A1C 9.8 9.8 7.4           Current Outpatient Medications on File Prior to Visit   Medication Sig   • atorvastatin (LIPITOR) 20 MG tablet    • baclofen (LIORESAL) 10 MG tablet Take 1 tablet by mouth every night at bedtime.   • gabapentin (NEURONTIN) 300 MG capsule    • glimepiride (AMARYL) 4 MG tablet    • glucose blood (Accu-Chek Guide) test strip Use as instructed   • HYDROcodone-acetaminophen (NORCO) 7.5-325 MG per tablet 1/2 to 1 tablet every 6 hours if needed for pain   • ibuprofen (ADVIL,MOTRIN) 800 MG tablet    • losartan (COZAAR) 100 MG tablet TAKE 1 TABLET BY MOUTH EVERY DAY   • metFORMIN (GLUCOPHAGE) 500 MG tablet TAKE 2 TABLETS BY MOUTH 2 TIMES A DAY WITH MEALS.   • ondansetron (ZOFRAN) 4 MG tablet TAKE 1 TABLET BY MOUTH EVERY 8 HOURS AS NEEDED FOR NAUSEA OR VOMITING.   • SITagliptin (Januvia) 100 MG tablet Take 1 tablet by mouth Daily.   • vitamin B-12 (CYANOCOBALAMIN) 1000 MCG tablet Take 1 tablet by mouth Daily.   • [DISCONTINUED] acyclovir (ZOVIRAX) 800 MG tablet Take 1 tablet by mouth 5 (Five) Times a Day.   • [DISCONTINUED] nystatin (MYCOSTATIN) 105869 UNIT/GM powder Apply  topically to the appropriate area as directed 3 (Three) Times a Day.   • [DISCONTINUED] Semaglutide,0.25 or 0.5MG/DOS, (Ozempic, 0.25 or 0.5 MG/DOSE,) 2 MG/1.5ML solution pen-injector Inject 0.25 mg under the skin into the appropriate area as directed 1 (One) Time Per Week.     No current facility-administered medications on file prior to visit.       Objective   Vital Signs:   /76  "(BP Location: Right arm, Patient Position: Sitting, Cuff Size: Adult)   Pulse 95   Temp 97.8 °F (36.6 °C) (Temporal)   Resp 18   Ht 157.5 cm (62\")   Wt 96.8 kg (213 lb 6 oz)   SpO2 98% Comment: room air  BMI 39.03 kg/m²       Physical Exam  Vitals and nursing note reviewed.   Constitutional:       General: She is not in acute distress.     Appearance: She is well-developed. She is obese.   HENT:      Head: Normocephalic and atraumatic.   Cardiovascular:      Rate and Rhythm: Normal rate and regular rhythm.      Heart sounds: No murmur heard.  Pulmonary:      Effort: Pulmonary effort is normal.      Breath sounds: Normal breath sounds. No wheezing.   Musculoskeletal:         General: Normal range of motion.   Skin:     General: Skin is warm and dry.      Findings: No rash.      Comments: Very short nails of hands bilaterally   Neurological:      Mental Status: She is alert and oriented to person, place, and time.   Psychiatric:         Mood and Affect: Mood normal.         Behavior: Behavior normal.         Thought Content: Thought content normal.         Judgment: Judgment normal.            Office Visit on 09/30/2022   Component Date Value Ref Range Status   • Hemoglobin A1C 09/30/2022 7.4  % Final   • Lot Number 09/30/2022 [id number]   Final   • Expiration Date 09/30/2022 08/31/2024   Final             Lab Results   Component Value Date    HGBA1C 7.4 09/30/2022    HGBA1C 9.8 06/20/2022    HGBA1C 9.8 03/15/2022    HGBA1C 7.8 12/15/2021                Assessment and Plan    Diagnoses and all orders for this visit:    1. Type 2 diabetes mellitus with hyperglycemia, without long-term current use of insulin (HCC) (Primary)  -     POC Glycosylated Hemoglobin (Hb A1C)  -     Hemoglobin A1c; Future  -     MicroAlbumin, Urine, Random - Urine, Clean Catch; Future  -     CBC & Differential; Future    2. Morbid (severe) obesity due to excess calories (HCC)    3. Mixed hyperlipidemia  -     Lipid Panel; Future    4. " Hypertension, essential    5. Elevated LFTs  -     Comprehensive Metabolic Panel; Future    6. Pruritic condition  -     TSH; Future  -     T4, Free; Future  -     hydrOXYzine (ATARAX) 25 MG tablet; Take 1 tablet by mouth 3 (Three) Times a Day As Needed for Itching, Allergies or Anxiety.  Dispense: 30 tablet; Refill: 2    7. Nail breaking  -     Acetylcysteine (N-Acetyl-L-Cysteine) 600 MG capsule; Take 2 capsules by mouth 2 (Two) Times a Day.  Dispense: 120 capsule; Refill: 3    Diabetes improved with improvement dietitian plan continue current medications and reduced glucose diet    Skin itching, patient has previously used Atarax on a as needed basis,prescription given.  Advised of  risk of sedation        Medications Discontinued During This Encounter   Medication Reason   • acyclovir (ZOVIRAX) 800 MG tablet *Therapy completed   • nystatin (MYCOSTATIN) 215286 UNIT/GM powder *Therapy completed   • Semaglutide,0.25 or 0.5MG/DOS, (Ozempic, 0.25 or 0.5 MG/DOSE,) 2 MG/1.5ML solution pen-injector Cost of medication         Follow Up     Return in about 2 months (around 12/10/2022) for Recheck, diabetes, htn, Medicare Wellness, with Labs.    Patient was given instructions and counseling regarding her condition or for health maintenance advice. Please see specific information pulled into the AVS if appropriate.

## 2022-10-03 ENCOUNTER — TELEPHONE (OUTPATIENT)
Dept: FAMILY MEDICINE CLINIC | Facility: CLINIC | Age: 67
End: 2022-10-03

## 2022-10-03 NOTE — TELEPHONE ENCOUNTER
Pharmacy called and stat that N-Acetyl-L-Cysteine is not covered by insurance and estimated cost is greater than $200 and is requesting an alternative.

## 2022-10-05 NOTE — TELEPHONE ENCOUNTER
There is no alternative. (Prescribed for nasal picking) had advised patient she could try this over-the-counter.  Let patient know it is not covered and have her check good Rx or shop around if she would like to try it.

## 2022-10-10 DIAGNOSIS — M54.50 ACUTE MIDLINE LOW BACK PAIN WITHOUT SCIATICA: ICD-10-CM

## 2022-10-10 DIAGNOSIS — M51.36 DDD (DEGENERATIVE DISC DISEASE), LUMBAR: ICD-10-CM

## 2022-10-10 RX ORDER — HYDROCODONE BITARTRATE AND ACETAMINOPHEN 7.5; 325 MG/1; MG/1
TABLET ORAL
Qty: 28 TABLET | Refills: 0 | Status: SHIPPED | OUTPATIENT
Start: 2022-10-10 | End: 2022-10-24 | Stop reason: SDUPTHER

## 2022-10-10 NOTE — TELEPHONE ENCOUNTER
"    Caller: Suellen Rubin    Relationship: Self    Best call back number: [phone]    Requested Prescriptions:   Requested Prescriptions     Pending Prescriptions Disp Refills   • HYDROcodone-acetaminophen (NORCO) 7.5-325 MG per tablet 28 tablet 0     Si/2 to 1 tablet every 6 hours if needed for pain        Pharmacy where request should be sent: [facility]/PHARMACY #[id number] - [place], [place] - [address] AT [place] [address] [facility] [phone] [facility] [phone] FX         Does the patient have less than a 3 day supply:  [x] Yes  [] No    Fidelia Villanueva, Jalened Rep   10/10/22 13:19 EDT     PLEASE ADVISE.          "

## 2022-10-23 DIAGNOSIS — I10 HYPERTENSION, ESSENTIAL: ICD-10-CM

## 2022-10-24 ENCOUNTER — TELEPHONE (OUTPATIENT)
Dept: FAMILY MEDICINE CLINIC | Facility: CLINIC | Age: 67
End: 2022-10-24

## 2022-10-24 DIAGNOSIS — M51.36 DDD (DEGENERATIVE DISC DISEASE), LUMBAR: ICD-10-CM

## 2022-10-24 DIAGNOSIS — M54.50 ACUTE MIDLINE LOW BACK PAIN WITHOUT SCIATICA: ICD-10-CM

## 2022-10-24 RX ORDER — HYDROCODONE BITARTRATE AND ACETAMINOPHEN 7.5; 325 MG/1; MG/1
TABLET ORAL
Qty: 28 TABLET | Refills: 0 | Status: SHIPPED | OUTPATIENT
Start: 2022-10-24 | End: 2022-12-12 | Stop reason: SDUPTHER

## 2022-10-24 RX ORDER — LOSARTAN POTASSIUM 100 MG/1
TABLET ORAL
Qty: 90 TABLET | Refills: 1 | Status: SHIPPED | OUTPATIENT
Start: 2022-10-24

## 2022-10-24 NOTE — TELEPHONE ENCOUNTER
Please inform patient that I will renew 1 more time but she needs to schedule a pain management appointment within the next couple of weeks before any additional refills.

## 2022-10-24 NOTE — TELEPHONE ENCOUNTER
"Incoming Refill Request      Medication requested (name and dose):   HYDROcodone-acetaminophen (NORCO) 7.5-325 MG per tablet   0 ordered         Pharmacy where request should be sent: [facility]/pharmacy #[id number] - [place] [address] AT [place] [address] [facility] - [phone]  - [phone]   [phone]    Additional details provided by patient: PATIENT SAID SHE'S BEEN TAKING ABOUT 2 A DAY AND HAS FOUR PILLS LEFT    Best call back number: [phone]    Does the patient have less than a 3 day supply:  [x] Yes  [] No    Desiree Buitrago Rep  10/24/22, 14:21 EDT            "

## 2022-11-07 DIAGNOSIS — M51.36 DDD (DEGENERATIVE DISC DISEASE), LUMBAR: ICD-10-CM

## 2022-11-07 DIAGNOSIS — M54.50 ACUTE MIDLINE LOW BACK PAIN WITHOUT SCIATICA: ICD-10-CM

## 2022-11-07 RX ORDER — HYDROCODONE BITARTRATE AND ACETAMINOPHEN 7.5; 325 MG/1; MG/1
TABLET ORAL
Qty: 28 TABLET | Refills: 0 | OUTPATIENT
Start: 2022-11-07

## 2022-11-07 NOTE — TELEPHONE ENCOUNTER
"    Caller: Suellen Rubin    Relationship: Self    Best call back number: [phone]    Requested Prescriptions:   Requested Prescriptions     Pending Prescriptions Disp Refills   • HYDROcodone-acetaminophen (NORCO) 7.5-325 MG per tablet 28 tablet 0     Si/2 to 1 tablet every 6 hours if needed for pain        Pharmacy where request should be sent: [facility]/PHARMACY #[id number] - [place] - [address] [facility] [address] [facility] [address] AT [place] [address] [facility] [phone] [facility] [phone] FX     Does the patient have less than a 3 day supply:  [x] Yes  [] No    Desiree Steward Rep   22 11:46 EST  "

## 2022-11-07 NOTE — TELEPHONE ENCOUNTER
Last visit October 24, 2022 I stated I would renew 1 more time but she needs to schedule a pain management appointment within the next couple of weeks before any additional refills.  Please let patient know she needs to schedule a pain management appointment.  If she is running out, I think I have availability tomorrow

## 2022-11-13 DIAGNOSIS — E11.65 TYPE 2 DIABETES MELLITUS WITH HYPERGLYCEMIA, WITHOUT LONG-TERM CURRENT USE OF INSULIN: ICD-10-CM

## 2022-11-15 ENCOUNTER — OFFICE VISIT (OUTPATIENT)
Dept: FAMILY MEDICINE CLINIC | Facility: CLINIC | Age: 67
End: 2022-11-15

## 2022-11-15 ENCOUNTER — HOSPITAL ENCOUNTER (OUTPATIENT)
Dept: GENERAL RADIOLOGY | Facility: HOSPITAL | Age: 67
Discharge: HOME | End: 2022-11-15

## 2022-11-15 VITALS
HEIGHT: 62 IN | WEIGHT: 214.5 LBS | HEART RATE: 102 BPM | DIASTOLIC BLOOD PRESSURE: 74 MMHG | OXYGEN SATURATION: 96 % | TEMPERATURE: 97.5 F | BODY MASS INDEX: 39.47 KG/M2 | RESPIRATION RATE: 18 BRPM | SYSTOLIC BLOOD PRESSURE: 132 MMHG

## 2022-11-15 DIAGNOSIS — G89.29 CHRONIC MIDLINE LOW BACK PAIN WITHOUT SCIATICA: ICD-10-CM

## 2022-11-15 DIAGNOSIS — M51.36 DDD (DEGENERATIVE DISC DISEASE), LUMBAR: ICD-10-CM

## 2022-11-15 DIAGNOSIS — G89.29 MID BACK PAIN, CHRONIC: ICD-10-CM

## 2022-11-15 DIAGNOSIS — E66.01 CLASS 2 SEVERE OBESITY DUE TO EXCESS CALORIES WITH SERIOUS COMORBIDITY AND BODY MASS INDEX (BMI) OF 39.0 TO 39.9 IN ADULT: Primary | ICD-10-CM

## 2022-11-15 DIAGNOSIS — Z87.891 FORMER SMOKER: ICD-10-CM

## 2022-11-15 DIAGNOSIS — M54.50 CHRONIC MIDLINE LOW BACK PAIN WITHOUT SCIATICA: ICD-10-CM

## 2022-11-15 DIAGNOSIS — M54.9 MID BACK PAIN, CHRONIC: ICD-10-CM

## 2022-11-15 PROBLEM — E66.812 CLASS 2 SEVERE OBESITY DUE TO EXCESS CALORIES WITH SERIOUS COMORBIDITY AND BODY MASS INDEX (BMI) OF 39.0 TO 39.9 IN ADULT: Status: ACTIVE | Noted: 2021-12-20

## 2022-11-15 PROBLEM — M51.369 DDD (DEGENERATIVE DISC DISEASE), LUMBAR: Status: ACTIVE | Noted: 2022-11-15

## 2022-11-15 PROCEDURE — 72110 X-RAY EXAM L-2 SPINE 4/>VWS: CPT

## 2022-11-15 PROCEDURE — 99214 OFFICE O/P EST MOD 30 MIN: CPT | Performed by: FAMILY MEDICINE

## 2022-11-15 PROCEDURE — 72072 X-RAY EXAM THORAC SPINE 3VWS: CPT

## 2022-11-15 RX ORDER — HYDROCODONE BITARTRATE AND ACETAMINOPHEN 7.5; 325 MG/1; MG/1
TABLET ORAL
Qty: 60 TABLET | Refills: 0 | Status: SHIPPED | OUTPATIENT
Start: 2022-11-15 | End: 2022-12-12

## 2022-11-15 NOTE — PROGRESS NOTES
Chief Complaint  pain management  (For back pain)  History of Present Illness    Suellen Rubin presents today for pain management.   Patient reports pain worse in evenings maybe aggravated by weather i.e. storm fronts or charges in temperature. Between shoulder blades down midline to below waist line.   Also pain in knees that prevents falling asleep  Initially was taking 1 at night, most nights. Around August pain worsened and recently will take one in am or late afternoon.   Patient states medications are working well.   Aggravating factors include standing, twisting, bending over.  Pain scale today is 4.   Inspect done on 11/15/2022.  Patient has completed a pain contract today    PHQ-9 Depression Screening  Little interest or pleasure in doing things? 0-->not at all   Feeling down, depressed, or hopeless? 0-->not at all   Trouble falling or staying asleep, or sleeping too much?     Feeling tired or having little energy?     Poor appetite or overeating?     Feeling bad about yourself - or that you are a failure or have let yourself or your family down?     Trouble concentrating on things, such as reading the newspaper or watching television?     Moving or speaking so slowly that other people could have noticed? Or the opposite - being so fidgety or restless that you have been moving around a lot more than usual?     Thoughts that you would be better off dead, or of hurting yourself in some way?     PHQ-9 Total Score 0   If you checked off any problems, how difficult have these problems made it for you to do your work, take care of things at home, or get along with other people?             Current Outpatient Medications on File Prior to Visit   Medication Sig   • Acetylcysteine (N-Acetyl-L-Cysteine) 600 MG capsule Take 2 capsules by mouth 2 (Two) Times a Day.   • atorvastatin (LIPITOR) 20 MG tablet    • baclofen (LIORESAL) 10 MG tablet Take 1 tablet by mouth every night at bedtime.   • gabapentin  "(NEURONTIN) 300 MG capsule    • glimepiride (AMARYL) 4 MG tablet    • glucose blood (Accu-Chek Guide) test strip Use as instructed   • HYDROcodone-acetaminophen (NORCO) 7.5-325 MG per tablet 1/2 to 1 tablet every 6 hours if needed for pain   • hydrOXYzine (ATARAX) 25 MG tablet Take 1 tablet by mouth 3 (Three) Times a Day As Needed for Itching, Allergies or Anxiety.   • ibuprofen (ADVIL,MOTRIN) 800 MG tablet    • losartan (COZAAR) 100 MG tablet TAKE 1 TABLET BY MOUTH EVERY DAY   • metFORMIN (GLUCOPHAGE) 500 MG tablet TAKE 2 TABLETS BY MOUTH 2 TIMES A DAY WITH MEALS.   • ondansetron (ZOFRAN) 4 MG tablet TAKE 1 TABLET BY MOUTH EVERY 8 HOURS AS NEEDED FOR NAUSEA OR VOMITING.   • SITagliptin (Januvia) 100 MG tablet Take 1 tablet by mouth Daily.   • vitamin B-12 (CYANOCOBALAMIN) 1000 MCG tablet Take 1 tablet by mouth Daily.     No current facility-administered medications on file prior to visit.       Objective   Vital Signs:   /74 (BP Location: Right arm, Patient Position: Sitting, Cuff Size: Adult)   Pulse 102   Temp 97.5 °F (36.4 °C) (Temporal)   Resp 18   Ht 157.5 cm (62\")   Wt 97.3 kg (214 lb 8 oz)   SpO2 96% Comment: room air  BMI 39.23 kg/m²       Physical Exam  Vitals and nursing note reviewed.   Constitutional:       General: She is not in acute distress.     Appearance: She is well-developed. She is obese.   HENT:      Head: Normocephalic and atraumatic.   Cardiovascular:      Rate and Rhythm: Normal rate and regular rhythm.      Heart sounds: No murmur heard.  Pulmonary:      Effort: Pulmonary effort is normal.      Breath sounds: Normal breath sounds. No wheezing.   Musculoskeletal:         General: Normal range of motion.      Comments: There is mild tenderness to palpation of the lumbosacral spine and paraspinous muscles as well as the mid to lower thoracic spine.  Negative straight leg raises, normal reflexes, normal sensation of lower extremities bilaterally   Skin:     General: Skin is warm " and dry.      Findings: No rash.   Neurological:      Mental Status: She is alert and oriented to person, place, and time.   Psychiatric:         Mood and Affect: Mood normal.         Behavior: Behavior normal.         Thought Content: Thought content normal.         Judgment: Judgment normal.            No visits with results within 1 Day(s) from this visit.   Latest known visit with results is:   Office Visit on 09/30/2022   Component Date Value Ref Range Status   • Hemoglobin A1C 09/30/2022 7.4  % Final   • Lot Number 09/30/2022 [id number]   Final   • Expiration Date 09/30/2022 08/31/2024   Final             Lab Results   Component Value Date    HGBA1C 7.4 09/30/2022    HGBA1C 9.8 06/20/2022    HGBA1C 9.8 03/15/2022    HGBA1C 7.8 12/15/2021           Examination: XR SPINE LUMBAR COMPLETE 4+VW-     Date of Exam: 11/15/2022 3:08 PM     Indication: low back pain; M51.36-Other intervertebral disc  degeneration, lumbar region; M54.50-Low back pain, unspecified;  G89.29-Other chronic pain; M54.9-Dorsalgia, unspecified; G89.29-Other  chronic pain.     Comparison: None available.     Technique: Five radiographic views of the lumbar spine were obtained.     Findings: There are 5 lumbar type vertebral bodies.  Alignment is  anatomic.  Vertebral bodies maintain normal height. There is mild  narrowing of the disc spaces diffusely with mild marginal osteophyte  formation. There is moderate mid and lower lumbar facet hypertrophy.  Spinous and transverse processes and pedicles appear intact.  Sacroiliac  joints appear symmetric. No acute fracture or subluxation. No  spondylolysis.     IMPRESSION:  Mild disc and moderate facet joint degeneration.     Electronically Signed By-Juan Dumont MD On:11/15/2022 3:39 PM  This report was finalized on [id number] by  Juan Dumont MD.     Assessment and Plan    Diagnoses and all orders for this visit:    1. Class 2 severe obesity due to excess calories with serious comorbidity and body  mass index (BMI) of 39.0 to 39.9 in adult (HCC) (Primary)  Assessment & Plan:  Patient's (Body mass index is 39.23 kg/m².) indicates that they are obese (BMI >30) with health conditions that include hypertension, diabetes mellitus and dyslipidemias . Weight is unchanged. BMI is is above average; BMI management plan is completed. We discussed portion control and increasing exercise.       2. Former smoker    3. DDD (degenerative disc disease), lumbar  -     Ambulatory Referral to Physical Therapy Evaluate and treat  -     XR Spine Lumbar Complete 4+VW  -     XR Spine Thoracic 3 View  -     HYDROcodone-acetaminophen (NORCO) 7.5-325 MG per tablet; 1/2 to 1 tablet every 4-6 hours if needed for pain  Dispense: 60 tablet; Refill: 0    4. Chronic midline low back pain without sciatica  -     Ambulatory Referral to Physical Therapy Evaluate and treat  -     XR Spine Lumbar Complete 4+VW  -     XR Spine Thoracic 3 View  -     HYDROcodone-acetaminophen (NORCO) 7.5-325 MG per tablet; 1/2 to 1 tablet every 4-6 hours if needed for pain  Dispense: 60 tablet; Refill: 0    5. Mid back pain, chronic  -     Ambulatory Referral to Physical Therapy Evaluate and treat  -     XR Spine Lumbar Complete 4+VW  -     XR Spine Thoracic 3 View  -     HYDROcodone-acetaminophen (NORCO) 7.5-325 MG per tablet; 1/2 to 1 tablet every 4-6 hours if needed for pain  Dispense: 60 tablet; Refill: 0    Patient has been receiving gabapentin on a regular basis for treatment of chronic pain from previous provider and currently from previous providers (currently Dina Grace) for the past 2 years 1 at  for diabetic neuropathy. Do not know if help with back pain or sleep.   She has been receiving a small quantity of hydrocodone for me for breakthrough pain since March 2022 this had been a quantity of 28 approximately every 5- 6 weeks but recently she has increased frequency of requested refills to twice a month.  Advised that this is approaching a  threshold of 60 tablets in a month requires formal pain management contract..    Inspect report has been reviewed, and is as expected. Pain inventory, COMM, PHQ,  has been completed and reviewed and pain management agreement has been signed.  Inspect report has been reviewed and is as expected     The patient has a documented plan of care to address pain.         There are no discontinued medications.      Follow Up     Return in about 4 weeks (around 12/13/2022) for Recheck, diabetes, pain managment, with Labs.    Patient was given instructions and counseling regarding her condition or for health maintenance advice. Please see specific information pulled into the AVS if appropriate.

## 2022-11-15 NOTE — ASSESSMENT & PLAN NOTE
Patient's (Body mass index is 39.23 kg/m².) indicates that they are obese (BMI >30) with health conditions that include hypertension, diabetes mellitus and dyslipidemias . Weight is unchanged. BMI is is above average; BMI management plan is completed. We discussed portion control and increasing exercise.   
no

## 2022-11-18 DIAGNOSIS — E11.65 TYPE 2 DIABETES MELLITUS WITH HYPERGLYCEMIA, WITHOUT LONG-TERM CURRENT USE OF INSULIN: ICD-10-CM

## 2022-12-12 ENCOUNTER — OFFICE VISIT (OUTPATIENT)
Dept: FAMILY MEDICINE CLINIC | Facility: CLINIC | Age: 67
End: 2022-12-12
Payer: MEDICARE

## 2022-12-12 VITALS
RESPIRATION RATE: 18 BRPM | HEIGHT: 62 IN | WEIGHT: 216.38 LBS | SYSTOLIC BLOOD PRESSURE: 132 MMHG | TEMPERATURE: 97.8 F | BODY MASS INDEX: 39.82 KG/M2 | DIASTOLIC BLOOD PRESSURE: 70 MMHG | HEART RATE: 102 BPM | OXYGEN SATURATION: 97 %

## 2022-12-12 DIAGNOSIS — Z02.83 ENCOUNTER FOR DRUG SCREENING: ICD-10-CM

## 2022-12-12 DIAGNOSIS — R79.89 ELEVATED TSH: ICD-10-CM

## 2022-12-12 DIAGNOSIS — E11.65 TYPE 2 DIABETES MELLITUS WITH HYPERGLYCEMIA, WITHOUT LONG-TERM CURRENT USE OF INSULIN: Primary | ICD-10-CM

## 2022-12-12 DIAGNOSIS — M51.36 DDD (DEGENERATIVE DISC DISEASE), LUMBAR: ICD-10-CM

## 2022-12-12 DIAGNOSIS — E87.5 HYPERKALEMIA: ICD-10-CM

## 2022-12-12 DIAGNOSIS — E66.01 CLASS 2 SEVERE OBESITY DUE TO EXCESS CALORIES WITH SERIOUS COMORBIDITY AND BODY MASS INDEX (BMI) OF 39.0 TO 39.9 IN ADULT: ICD-10-CM

## 2022-12-12 DIAGNOSIS — E78.2 MIXED HYPERLIPIDEMIA: ICD-10-CM

## 2022-12-12 DIAGNOSIS — Z13.29 SCREENING FOR HYPOTHYROIDISM: ICD-10-CM

## 2022-12-12 DIAGNOSIS — I10 HYPERTENSION, ESSENTIAL: ICD-10-CM

## 2022-12-12 DIAGNOSIS — M54.50 ACUTE MIDLINE LOW BACK PAIN WITHOUT SCIATICA: ICD-10-CM

## 2022-12-12 DIAGNOSIS — Z87.891 FORMER SMOKER: ICD-10-CM

## 2022-12-12 PROCEDURE — 99214 OFFICE O/P EST MOD 30 MIN: CPT | Performed by: FAMILY MEDICINE

## 2022-12-12 RX ORDER — HYDROCODONE BITARTRATE AND ACETAMINOPHEN 7.5; 325 MG/1; MG/1
TABLET ORAL
Qty: 90 TABLET | Refills: 0 | Status: SHIPPED | OUTPATIENT
Start: 2022-12-12 | End: 2023-01-16 | Stop reason: SDUPTHER

## 2022-12-12 NOTE — ASSESSMENT & PLAN NOTE
Patient's (Body mass index is 39.58 kg/m².) indicates that they are obese (BMI >30) with health conditions that include hypertension, diabetes mellitus and dyslipidemias . Weight is unchanged. BMI is is above average; BMI management plan is completed. We discussed portion control and increasing exercise.

## 2022-12-12 NOTE — PROGRESS NOTES
Chief Complaint  Diabetes and pain management     History of Present Illness  Suellen Rubin presents today for follow up on diabetes, and pain management.  Patient does check blood sugar at home randomly.  Associated symptoms include neuropathy in feet.  Per patient current diet is variety of foods.  Patient does avoid concentrated sweets.  Patient does not see podiatry.  Patient see's [facility] for diabetic eye exam and last eye exam was greater than 1 year ago.      Chroinc stabing low back pain.   Currently taking about 3 hydrocodone daily. Scheduled for PT initial  appt tomorrow.  Patient states pain medications are working well.   Aggravating factors include bending.  Pain scale today is 4.   Patient pain contract is up to date. 11/15/2022           Current Outpatient Medications on File Prior to Visit   Medication Sig   • atorvastatin (LIPITOR) 20 MG tablet    • gabapentin (NEURONTIN) 300 MG capsule    • glimepiride (AMARYL) 4 MG tablet    • hydrOXYzine (ATARAX) 25 MG tablet Take 1 tablet by mouth 3 (Three) Times a Day As Needed for Itching, Allergies or Anxiety.   • losartan (COZAAR) 100 MG tablet TAKE 1 TABLET BY MOUTH EVERY DAY   • ondansetron (ZOFRAN) 4 MG tablet TAKE 1 TABLET BY MOUTH EVERY 8 HOURS AS NEEDED FOR NAUSEA OR VOMITING.   • SITagliptin (Januvia) 100 MG tablet Take 1 tablet by mouth Daily.     No current facility-administered medications on file prior to visit.       Objective   Vital Signs:   /70 (BP Location: Right arm, Patient Position: Sitting, Cuff Size: Adult)   Pulse 102   Temp 97.8 °F (36.6 °C) (Temporal)   Resp 18   Ht 157.5 cm (62\")   Wt 98.1 kg (216 lb 6 oz)   SpO2 97%   BMI 39.58 kg/m²       Physical Exam  Vitals and nursing note reviewed.   Constitutional:       General: She is not in acute distress.     Appearance: She is well-developed. She is obese.   HENT:      Head: Normocephalic and atraumatic.   Cardiovascular:      Rate and Rhythm: Normal rate and regular  rhythm.      Heart sounds: No murmur heard.  Pulmonary:      Effort: Pulmonary effort is normal.      Breath sounds: Normal breath sounds. No wheezing.   Musculoskeletal:         General: Normal range of motion.      Comments: Tenderness palpation of lumbosacral midline spine and paraspinous muscles.  Normal range of motion, normal sensation, normal strength and tone and normal reflexes lower extremities bilaterally   Skin:     General: Skin is warm and dry.      Findings: No rash.   Neurological:      Mental Status: She is alert and oriented to person, place, and time.   Psychiatric:         Mood and Affect: Mood normal.         Behavior: Behavior normal.         Thought Content: Thought content normal.         Judgment: Judgment normal.            No visits with results within 1 Day(s) from this visit.   Latest known visit with results is:   Results Encounter on 11/28/2022   Component Date Value Ref Range Status   • Hemoglobin A1C 12/06/2022 7.9 (H)  4.8 - 5.6 % Final    Comment:          Prediabetes: 5.7 - 6.4           Diabetes: >6.4           Glycemic control for adults with diabetes: <7.0     • Glucose 12/06/2022 186 (H)  70 - 99 mg/dL Final   • BUN 12/06/2022 17  8 - 27 mg/dL Final   • Creatinine 12/06/2022 1.02 (H)  0.57 - 1.00 mg/dL Final   • EGFR Result 12/06/2022 60  >59 mL/min/1.73 Final   • BUN/Creatinine Ratio 12/06/2022 17  12 - 28 Final   • Sodium 12/06/2022 138  134 - 144 mmol/L Final   • Potassium 12/06/2022 5.4 (H)  3.5 - 5.2 mmol/L Final   • Chloride 12/06/2022 100  96 - 106 mmol/L Final   • Total CO2 12/06/2022 25  20 - 29 mmol/L Final   • Calcium 12/06/2022 9.6  8.7 - 10.3 mg/dL Final   • Total Protein 12/06/2022 7.7  6.0 - 8.5 g/dL Final   • Albumin 12/06/2022 4.3  3.8 - 4.8 g/dL Final   • Globulin 12/06/2022 3.4  1.5 - 4.5 g/dL Final   • A/G Ratio 12/06/2022 1.3  1.2 - 2.2 Final   • Total Bilirubin 12/06/2022 0.4  0.0 - 1.2 mg/dL Final   • Alkaline Phosphatase 12/06/2022 118  44 - 121 IU/L  Final   • AST (SGOT) 12/06/2022 46 (H)  0 - 40 IU/L Final   • ALT (SGPT) 12/06/2022 37 (H)  0 - 32 IU/L Final   • Total Cholesterol 12/06/2022 144  100 - 199 mg/dL Final   • Triglycerides 12/06/2022 241 (H)  0 - 149 mg/dL Final   • HDL Cholesterol 12/06/2022 29 (L)  >39 mg/dL Final   • VLDL Cholesterol Santi 12/06/2022 40  5 - 40 mg/dL Final   • LDL Chol Calc (NIH) 12/06/2022 75  0 - 99 mg/dL Final   • TSH 12/06/2022 5.630 (H)  0.450 - 4.500 uIU/mL Final   • Free T4 12/06/2022 1.08  0.82 - 1.77 ng/dL Final   • Microalbumin, Urine 12/06/2022 4.8  Not Estab. ug/mL Final   • WBC 12/06/2022 7.1  3.4 - 10.8 x10E3/uL Final   • RBC 12/06/2022 4.92  3.77 - 5.28 x10E6/uL Final   • Hemoglobin 12/06/2022 14.2  11.1 - 15.9 g/dL Final   • Hematocrit 12/06/2022 42.4  34.0 - 46.6 % Final   • MCV 12/06/2022 86  79 - 97 fL Final   • MCH 12/06/2022 28.9  26.6 - 33.0 pg Final   • MCHC 12/06/2022 33.5  31.5 - 35.7 g/dL Final   • RDW 12/06/2022 13.4  11.7 - 15.4 % Final   • Platelets 12/06/2022 218  150 - 450 x10E3/uL Final   • Neutrophil Rel % 12/06/2022 51  Not Estab. % Final   • Lymphocyte Rel % 12/06/2022 35  Not Estab. % Final   • Monocyte Rel % 12/06/2022 9  Not Estab. % Final   • Eosinophil Rel % 12/06/2022 3  Not Estab. % Final   • Basophil Rel % 12/06/2022 1  Not Estab. % Final   • Neutrophils Absolute 12/06/2022 3.6  1.4 - 7.0 x10E3/uL Final   • Lymphocytes Absolute 12/06/2022 2.5  0.7 - 3.1 x10E3/uL Final   • Monocytes Absolute 12/06/2022 0.6  0.1 - 0.9 x10E3/uL Final   • Eosinophils Absolute 12/06/2022 0.2  0.0 - 0.4 x10E3/uL Final   • Basophils Absolute 12/06/2022 0.1  0.0 - 0.2 x10E3/uL Final   • Immature Granulocyte Rel % 12/06/2022 1  Not Estab. % Final   • Immature Grans Absolute 12/06/2022 0.0  0.0 - 0.1 x10E3/uL Final     A1C Last 3 Results    HGBA1C Last 3 Results 6/20/22 9/30/22 12/6/22   Hemoglobin A1C 9.8 7.4 7.9 (A)   (A) Abnormal value       Comments are available for some flowsheets but are not being displayed.            Lab Results   Component Value Date    CHLPL 144 12/06/2022    TRIG 241 (H) 12/06/2022    HDL 29 (L) 12/06/2022    LDL 75 12/06/2022     Lab Results   Component Value Date    TSH 5.630 (H) 12/06/2022     Lab Results   Component Value Date    GLUCOSE 186 (H) 12/06/2022    BUN 17 12/06/2022    CREATININE 1.02 (H) 12/06/2022    EGFRIFNONA 72 12/15/2021    EGFRIFAFRI 83 12/15/2021    BCR 17 12/06/2022    K 5.4 (H) 12/06/2022    CO2 25 12/06/2022    CALCIUM 9.6 12/06/2022    PROTENTOTREF 7.7 12/06/2022    ALBUMIN 4.3 12/06/2022    LABIL2 1.3 12/06/2022    AST 46 (H) 12/06/2022    ALT 37 (H) 12/06/2022     Lab Results   Component Value Date    WBC 7.1 12/06/2022    HGB 14.2 12/06/2022    HCT 42.4 12/06/2022    MCV 86 12/06/2022     12/06/2022                      Assessment and Plan    Diagnoses and all orders for this visit:    1. Type 2 diabetes mellitus with hyperglycemia, without long-term current use of insulin ([facility]) (Primary)  -     TSH; Future  -     T4, Free; Future  -     T3, Free; Future    2. DDD (degenerative disc disease), lumbar  -     HYDROcodone-acetaminophen (NORCO) 7.5-325 MG per tablet; 1/2 to 1 tablet every 6 hours if needed for pain  Dispense: 90 tablet; Refill: 0  -     Cancel: Urine Drug Screen - Urine, Clean Catch    3. Former smoker    4. Class 2 severe obesity due to excess calories with serious comorbidity and body mass index (BMI) of 39.0 to 39.9 in adult ([facility])  Assessment & Plan:  Patient's (Body mass index is 39.58 kg/m².) indicates that they are obese (BMI >30) with health conditions that include hypertension, diabetes mellitus and dyslipidemias . Weight is unchanged. BMI is is above average; BMI management plan is completed. We discussed portion control and increasing exercise.       5. Mixed hyperlipidemia    6. Hypertension, essential  -     TSH; Future  -     T4, Free; Future  -     T3, Free; Future    7. Hyperkalemia  -     Basic Metabolic Panel; Future    8. Acute  midline low back pain without sciatica  -     HYDROcodone-acetaminophen (NORCO) 7.5-325 MG per tablet; 1/2 to 1 tablet every 6 hours if needed for pain  Dispense: 90 tablet; Refill: 0    9. Elevated TSH  -     TSH; Future  -     T4, Free; Future  -     T3, Free; Future    10. Screening for hypothyroidism  -     TSH; Future  -     T4, Free; Future  -     T3, Free; Future    11. Encounter for drug screening  -     Cancel: Urine Drug Screen - Urine, Clean Catch    Other orders  -     SCANNED - LABS  Diabetes, A1c has increased to 7.9.  We discussed adjusting medication patient elects to continue Januvia, metformin and Amaryl.  Did warn against hypoglycemic episodes with sulfonylureas and always have a glucose source available..  Patient agrees to being more diligent with dietary changes.  Hyperlipidemia on Lipitor, triglycerides remaining elevated labs otherwise good.  Suspect triglycerides will improve if better glycemic control.  Chronic low back pain   x-rays of both lumbar and thoracic spine show some mild disc and mild to moderate arthritic changes.  Urine drug screen today continue hydrocodone and gabapentin and start PT as already scheduled tomorrow    Medications Discontinued During This Encounter   Medication Reason   • HYDROcodone-acetaminophen (NORCO) 7.5-325 MG per tablet *Therapy completed   • ibuprofen (ADVIL,MOTRIN) 800 MG tablet *Therapy completed   • baclofen (LIORESAL) 10 MG tablet *Therapy completed   • Acetylcysteine (N-Acetyl-L-Cysteine) 600 MG capsule *Therapy completed   • HYDROcodone-acetaminophen (NORCO) 7.5-325 MG per tablet Reorder         Follow Up     No follow-ups on file.    Patient was given instructions and counseling regarding her condition or for health maintenance advice. Please see specific information pulled into the AVS if appropriate.

## 2022-12-13 ENCOUNTER — TREATMENT (OUTPATIENT)
Dept: PHYSICAL THERAPY | Facility: CLINIC | Age: 67
End: 2022-12-13

## 2022-12-13 DIAGNOSIS — G89.29 CHRONIC MIDLINE THORACIC BACK PAIN: ICD-10-CM

## 2022-12-13 DIAGNOSIS — M54.6 CHRONIC MIDLINE THORACIC BACK PAIN: ICD-10-CM

## 2022-12-13 DIAGNOSIS — M54.50 CHRONIC MIDLINE LOW BACK PAIN WITHOUT SCIATICA: Primary | ICD-10-CM

## 2022-12-13 DIAGNOSIS — R53.1 WEAKNESS GENERALIZED: ICD-10-CM

## 2022-12-13 DIAGNOSIS — G89.29 CHRONIC MIDLINE LOW BACK PAIN WITHOUT SCIATICA: Primary | ICD-10-CM

## 2022-12-13 PROCEDURE — 97162 PT EVAL MOD COMPLEX 30 MIN: CPT | Performed by: PHYSICAL THERAPIST

## 2022-12-13 NOTE — PROGRESS NOTES
Physical Therapy Initial Evaluation and Plan of Care  [address] [place] [address] [place] [address]    Patient: Suellen Rubin   : 1955  Diagnosis/ICD-10 Code:  Chronic midline low back pain without sciatica [M54.50, G89.29]  Referring practitioner: Julissa Butler*  Date of Initial Visit: 2022  Today's Date: 2022  Patient seen for 1 sessions           Subjective Questionnaire: PT Functional Test: Oswestry = 20/50, indicating 50% impairment      Subjective Evaluation    History of Present Illness  Mechanism of injury: Pt reports having mid back and low back pain for years that have progressively worsened. Pt states her back really hurts when she is bent forward a little bit (making her bed, vacuuming). Pain with washing dishes. Increased pain and difficulty standing >30 min. Has some pain with walking and doing grocery shopping but is able to do it and pain gets worse with standing while checking out. Increased low back pain with sit to stand. Back pain is not constant. Increases with activity and weather makes a difference too. Takes pain med 2-3x/day with relief. Has not tried using ice or heat. Pt reports having L knee pain x18 yr after she fell in her water meter hole but has not had any surgery.      Patient Occupation: retired Pain  Current pain ratin  At best pain ratin  At worst pain ratin  Location: low back and mid back  Quality: dull ache, tight and discomfort  Relieving factors: medications  Aggravating factors: lifting, movement, standing, prolonged positioning, repetitive movement and ambulation    Social Support  Lives in: trailer  Lives with: alone    Diagnostic Tests  X-ray: abnormal (see chart)    Patient Goals  Patient goals for therapy: decreased pain, improved balance, increased motion, increased strength and independence with ADLs/IADLs             Objective          Static Posture     Shoulders  Rounded.    Thoracic Spine  Hyperkyphosis.    Lumbar  Spine   Increased lordosis.     Active Range of Motion     Lumbar   Normal active range of motion    Additional Active Range of Motion Details  Pain with extension when coming back up from flexion.    Strength/Myotome Testing     Left Hip   Planes of Motion   Flexion: 4  Abduction: 4    Right Hip   Planes of Motion   Flexion: 4  Abduction: 4    Left Knee   Flexion: 4+  Extension: 4+    Right Knee   Flexion: 4+  Extension: 4+    Left Ankle/Foot   Dorsiflexion: 4+    Right Ankle/Foot   Dorsiflexion: 4+          Assessment & Plan     Assessment  Impairments: abnormal gait, abnormal muscle firing, abnormal muscle tone, abnormal or restricted ROM, activity intolerance, impaired balance, impaired physical strength, lacks appropriate home exercise program, pain with function, safety issue and weight-bearing intolerance  Functional Limitations: carrying objects, lifting, sleeping, walking, pulling, uncomfortable because of pain, moving in bed, sitting, standing, stooping and unable to perform repetitive tasks  Assessment details: Pt is a 67 y.o. female with low back and mid thoracic pain. Pt presents with decreased core strength and B LE weakness. Pt with impaired posture related to muscle weakness. Pt is having difficulty with tasks where she is bent over. Increased pain and difficulty making her bed. Difficulty and pain with washing dishes. Pain after grocery shopping. Difficulty with transitional movements. Oswestry indicates 50% impairment.    Patient presents with the impairments listed above and based on the objective findings and the physical therapy evaluation, the patient’s condition has the potential to improve in response to therapy.   The patient’s condition and/or services required are at a level of complexity that necessitates the skill & supervision of a physical therapist.    Prognosis: good    Goals  Plan Goals: STG to be met in 3 wk:  - Pt to report 50% improvement in pain during sit to stand.  - Pt to  report 25% or better improvement in pain with activity.  - Pt to demonstrated improved posture with decreased thoracic kyphosis with min verbal cues.  LTG to be met in 12 wk:  - Improve Oswestry to 25% or less impairment.  - Increase B hip flex and abd strength to 4+/5 in order to complete grocery shopping.  - Pt to demonstrate good technique with activation of core muscles when bent over to simulate making her bed.  - Pt to be independent with HEP.    Plan  Therapy options: will be seen for skilled therapy services  Planned modality interventions: electrical stimulation/Russian stimulation, thermotherapy (hydrocollator packs), traction, dry needling and cryotherapy  Planned therapy interventions: manual therapy, abdominal trunk stabilization, balance/weight-bearing training, body mechanics training, flexibility, functional ROM exercises, home exercise program, joint mobilization, neuromuscular re-education, postural training, soft tissue mobilization, spinal/joint mobilization, strengthening, stretching, therapeutic activities, transfer training and gait training  Frequency: 1x week  Duration in weeks: 12  Treatment plan discussed with: patient        Timed:         Manual Therapy:         mins  73489;     Therapeutic Exercise:    5     mins  94986;     Neuromuscular Andrew:        mins  72598;    Therapeutic Activity:          mins  00101;     Gait Training:           mins  82778;     Ultrasound:          mins  56319;    Ionto                                   mins   19105  Self - Care                          mins  14947    Un-Timed:  Electrical Stimulation:         mins  40675 ( );  Dry Needling          20561/20560  Traction          mins 67529  Can Repos          mins 47364  Low Eval          Mins  68649  Mod Eval     40     Mins  03217  High Eval                            Mins  66760      Timed Treatment:   5   mins   Total Treatment:     45   mins      PT SIGNATURE: Rosie Ramírez PT, CLT  [place] Lic #  [id number]  Electronically signed by Rosie Ramírez, PT, 12/13/22, 2:39 PM EST    Initial Certification  Certification Period: 12/13/2022 thru 3/12/2023  I certify that the therapy services are furnished while this patient is under my care.  The services outlined above are required by this patient, and will be reviewed every 90 days.     PHYSICIAN: Julissa Butler MD _____________________________________________________  NPI: [id number]                                      DATE:    Please sign and return via fax to [phone]. Thank you, [facility].

## 2022-12-29 DIAGNOSIS — E53.8 B12 DEFICIENCY: ICD-10-CM

## 2022-12-29 DIAGNOSIS — E11.65 TYPE 2 DIABETES MELLITUS WITH HYPERGLYCEMIA, WITHOUT LONG-TERM CURRENT USE OF INSULIN: ICD-10-CM

## 2022-12-30 RX ORDER — OMEGA-3/DHA/EPA/FISH OIL 35-113.5MG
TABLET,CHEWABLE ORAL
Qty: 90 TABLET | Refills: 1 | Status: SHIPPED | OUTPATIENT
Start: 2022-12-30

## 2022-12-30 RX ORDER — BLOOD SUGAR DIAGNOSTIC
STRIP MISCELLANEOUS
Qty: 50 EACH | Refills: 12 | Status: SHIPPED | OUTPATIENT
Start: 2022-12-30

## 2023-01-16 DIAGNOSIS — M51.36 DDD (DEGENERATIVE DISC DISEASE), LUMBAR: ICD-10-CM

## 2023-01-16 DIAGNOSIS — M54.50 ACUTE MIDLINE LOW BACK PAIN WITHOUT SCIATICA: ICD-10-CM

## 2023-01-16 RX ORDER — HYDROCODONE BITARTRATE AND ACETAMINOPHEN 7.5; 325 MG/1; MG/1
TABLET ORAL
Qty: 90 TABLET | Refills: 0 | Status: SHIPPED | OUTPATIENT
Start: 2023-01-16 | End: 2023-02-13 | Stop reason: SDUPTHER

## 2023-01-16 NOTE — TELEPHONE ENCOUNTER
"Caller: Suellen Rubin    Relationship: Self    Best call back number: [phone]    Requested Prescriptions:   Requested Prescriptions     Pending Prescriptions Disp Refills   • HYDROcodone-acetaminophen (NORCO) 7.5-325 MG per tablet 90 tablet 0     Si/2 to 1 tablet every 6 hours if needed for pain        Pharmacy where request should be sent: [facility]Y #[id number] - [place], [place] - [address] [place] [address] [facility] [phone] [facility] [phone] FX     Additional details provided by patient:  PATIENT STATES THAT SHE HAS A 2 DAY SUPPLY OF MEDICATION.    Does the patient have less than a 3 day supply:  [x] Yes  [] No    Would you like a call back once the refill request has been completed: [x] Yes [] No    If the office needs to give you a call back, can they leave a voicemail: [x] Yes [] No    PLEASE ADVISE.    Desiree Houser Rep   23 10:48 EST           "

## 2023-01-18 DIAGNOSIS — E11.65 TYPE 2 DIABETES MELLITUS WITH HYPERGLYCEMIA, WITHOUT LONG-TERM CURRENT USE OF INSULIN: ICD-10-CM

## 2023-02-13 DIAGNOSIS — M51.36 DDD (DEGENERATIVE DISC DISEASE), LUMBAR: ICD-10-CM

## 2023-02-13 DIAGNOSIS — M54.50 ACUTE MIDLINE LOW BACK PAIN WITHOUT SCIATICA: ICD-10-CM

## 2023-02-13 RX ORDER — HYDROCODONE BITARTRATE AND ACETAMINOPHEN 7.5; 325 MG/1; MG/1
TABLET ORAL
Qty: 90 TABLET | Refills: 0 | Status: SHIPPED | OUTPATIENT
Start: 2023-02-13 | End: 2023-03-13 | Stop reason: SDUPTHER

## 2023-02-13 NOTE — TELEPHONE ENCOUNTER
"  Caller: Suellen Rubin    Relationship: Self    Best call back number: [phone]    Requested Prescriptions:   Requested Prescriptions     Pending Prescriptions Disp Refills   • HYDROcodone-acetaminophen (NORCO) 7.5-325 MG per tablet 90 tablet 0     Si/2 to 1 tablet every 6 hours if needed for pain        Pharmacy where request should be sent: [facility]ACY #[id number] - [place], [place] - [address] [facility] [address] AT [place] [address] [facility] [phone] [facility] [phone]      Additional details provided by patient: PATIENT HAS 3 TABLETS REMAINING     Does the patient have less than a 3 day supply:  [x] Yes  [] No    Would you like a call back once the refill request has been completed: [] Yes [x] No    Desiree Webb Rep   23 12:42 EST           "
Lucy Samuel(Attending)

## 2023-02-14 DIAGNOSIS — E11.65 TYPE 2 DIABETES MELLITUS WITH HYPERGLYCEMIA, WITHOUT LONG-TERM CURRENT USE OF INSULIN: ICD-10-CM

## 2023-02-14 RX ORDER — SITAGLIPTIN 100 MG/1
TABLET, FILM COATED ORAL
Qty: 90 TABLET | Refills: 3 | Status: SHIPPED | OUTPATIENT
Start: 2023-02-14

## 2023-02-16 DIAGNOSIS — E11.65 TYPE 2 DIABETES MELLITUS WITH HYPERGLYCEMIA, WITHOUT LONG-TERM CURRENT USE OF INSULIN: ICD-10-CM

## 2023-03-06 NOTE — PROGRESS NOTES
Chief Complaint  Diabetes and Pain management    History of Present Illness  Suellen Rubin presents today for follow-up on diabetes, hypertension, elevated TSH, chronic back pain from degenerative disc disease/pain management and to review labs.    Diabetes  Patient does check blood sugar at home 1 times daily.  Per patient fasting blood sugars range between 144-250.  Associated symptoms include neuropathy.  Per patient current diet is Variety of foods.  Patient trys to avoid concentrated sweets.  Patient does not see podiatry.  Patient see's [facility] for diabetic eye exam and last eye exam was greater than 1 year ago.    Patient is currently taking Januvia, metformin and Amaryl.    She denies  hypoglycemic episodes but does keep a glucose source available for this possibility on sulfonylurea.     Chronic back pain, patient did do physical therapy starting in December.  She reports no improvement in back pain.  She has continued to take gabapentin and hydrocodone.  Patient reports that she only went to 1 visit of PT. Due to weather and not doing home exercises.       Pain Management   Patient states medications are working well.   Aggravating factors include standing, any house work standing.  Pain scale today is 3.   Inspect done on 03/13/2023.  Patient pain contract is up to date. 11/15/2022  Last UDS done on 12/12/2022.        Current Outpatient Medications on File Prior to Visit   Medication Sig   • Accu-Chek Guide test strip USE AS INSTRUCTED   • atorvastatin (LIPITOR) 20 MG tablet    • [facility] Vitamin B-12 1000 MCG tablet TAKE 1 TABLET BY MOUTH EVERY DAY   • Januvia 100 MG tablet TAKE 1 TABLET BY MOUTH EVERY DAY   • losartan (COZAAR) 100 MG tablet TAKE 1 TABLET BY MOUTH EVERY DAY   • metFORMIN (GLUCOPHAGE) 500 MG tablet TAKE 2 TABLETS BY MOUTH 2 TIMES A DAY WITH MEALS.   • ondansetron (ZOFRAN) 4 MG tablet TAKE 1 TABLET BY MOUTH EVERY 8 HOURS AS NEEDED FOR NAUSEA OR VOMITING.   • [DISCONTINUED] gabapentin  "(NEURONTIN) 300 MG capsule    • [DISCONTINUED] glimepiride (AMARYL) 4 MG tablet    • [DISCONTINUED] HYDROcodone-acetaminophen (NORCO) 7.5-325 MG per tablet 1/2 to 1 tablet every 6 hours if needed for pain   • [DISCONTINUED] hydrOXYzine (ATARAX) 25 MG tablet Take 1 tablet by mouth 3 (Three) Times a Day As Needed for Itching, Allergies or Anxiety.     No current facility-administered medications on file prior to visit.       Objective   Vital Signs:   /76 (BP Location: Right arm, Patient Position: Sitting, Cuff Size: Adult)   Pulse 102   Temp 98 °F (36.7 °C) (Temporal)   Resp 18   Ht 157.5 cm (62\")   Wt 98.6 kg (217 lb 6 oz)   SpO2 94% Comment: room air  BMI 39.76 kg/m²       Physical Exam  Vitals and nursing note reviewed.   Constitutional:       General: She is not in acute distress.     Appearance: She is well-developed. She is obese.   HENT:      Head: Normocephalic and atraumatic.   Cardiovascular:      Rate and Rhythm: Normal rate and regular rhythm.      Heart sounds: No murmur heard.  Pulmonary:      Effort: Pulmonary effort is normal.      Breath sounds: Normal breath sounds. No wheezing.   Musculoskeletal:         General: Normal range of motion.      Comments: Tenderness palpation of lumbosacral midline spine and paraspinous muscles.  Normal range of motion, normal sensation, normal strength and tone and normal reflexes lower extremities bilaterally   Skin:     General: Skin is warm and dry.      Findings: No rash.   Neurological:      Mental Status: She is alert and oriented to person, place, and time.   Psychiatric:         Mood and Affect: Mood normal.         Behavior: Behavior normal.         Thought Content: Thought content normal.         Judgment: Judgment normal.            Office Visit on 03/13/2023   Component Date Value Ref Range Status   • Hemoglobin A1C 03/13/2023 8.3  % Final   • Lot Number 03/13/2023 #[id number]   Final   • Expiration Date 03/13/2023 02/28/2025   Final     A1C Last " 3 Results    HGBA1C Last 3 Results 9/30/22 12/6/22 3/13/23   Hemoglobin A1C 7.4 7.9 (A) 8.3   (A) Abnormal value       Comments are available for some flowsheets but are not being displayed.           Lab Results   Component Value Date    CHLPL 144 12/06/2022    TRIG 241 (H) 12/06/2022    HDL 29 (L) 12/06/2022    LDL 75 12/06/2022     Lab Results   Component Value Date    TSH 4.770 (H) 03/07/2023     Lab Results   Component Value Date    GLUCOSE 223 (H) 03/07/2023    BUN 13 03/07/2023    CREATININE 1.00 03/07/2023    EGFRIFNONA 72 12/15/2021    EGFRIFAFRI 83 12/15/2021    BCR 13 03/07/2023    K 5.0 03/07/2023    CO2 20 03/07/2023    CALCIUM 9.7 03/07/2023    PROTENTOTREF 7.7 12/06/2022    ALBUMIN 4.3 12/06/2022    LABIL2 1.3 12/06/2022    AST 46 (H) 12/06/2022    ALT 37 (H) 12/06/2022     Lab Results   Component Value Date    WBC 7.1 12/06/2022    HGB 14.2 12/06/2022    HCT 42.4 12/06/2022    MCV 86 12/06/2022     12/06/2022                      Assessment and Plan    Diagnoses and all orders for this visit:    1. Type 2 diabetes mellitus with diabetic neuropathy, without long-term current use of insulin (HCC) (Primary)  -     POC Glycosylated Hemoglobin (Hb A1C)  -     glimepiride (AMARYL) 4 MG tablet; Take 1 tablet by mouth 2 (Two) Times a Day.  Dispense: 180 tablet; Refill: 3  -     gabapentin (NEURONTIN) 300 MG capsule; Take 2 capsules by mouth Every Night.  Dispense: 180 capsule; Refill: 1  -     Hemoglobin A1c; Future  -     Comprehensive Metabolic Panel; Future    2. Hypertension, essential  -     Comprehensive Metabolic Panel; Future    3. DDD (degenerative disc disease), lumbar  -     HYDROcodone-acetaminophen (NORCO) 7.5-325 MG per tablet; 1/2 to 1 tablet every 6 hours if needed for pain  Dispense: 90 tablet; Refill: 0  -     gabapentin (NEURONTIN) 300 MG capsule; Take 2 capsules by mouth Every Night.  Dispense: 180 capsule; Refill: 1    4. Elevated TSH  -     levothyroxine (SYNTHROID, LEVOTHROID)  25 MCG tablet; Take 1 tablet by mouth Daily.  Dispense: 90 tablet; Refill: 1    5. Hyperkalemia  -     Comprehensive Metabolic Panel; Future    6. Mid back pain, chronic    7. Acute midline low back pain without sciatica  -     HYDROcodone-acetaminophen (NORCO) 7.5-325 MG per tablet; 1/2 to 1 tablet every 6 hours if needed for pain  Dispense: 90 tablet; Refill: 0    8. Class 2 severe obesity due to excess calories with serious comorbidity and body mass index (BMI) of 39.0 to 39.9 in adult ([facility])  Assessment & Plan:  Patient's (Body mass index is 39.76 kg/m².) indicates that they are morbidly/severely obese (BMI > 40 or > 35 with obesity - related health condition) with health conditions that include hypertension, diabetes mellitus and dyslipidemias . Weight is unchanged. BMI  is above average; BMI management plan is completed. We discussed portion control and increasing exercise.       9. Acquired hypothyroidism  -     TSH; Future  -     T4, Free; Future  -     T3, Free; Future      Diabetes, A1c increased to 8.3.  Stressed need to follow low concentrated sweets diet, increasing Amaryl 4 mg to twice daily continuing metformin and Januvia.    Hypertension at goal continue current medication    Hyperkalemia, potassium has improved from 5.4-5.0.    Slightly elevated TSH without history of treatment for hypothyroidism, labs show slight improvement but persistently minimally elevated TSH  T3 and T4 have remained in normal limits.  Pt does report low energy and weight gain and will start a low dose levothyropxine.     Chronic low back pain from degenerative changes in lumbar and thoracic.  Since last visit patient did go to  physical therapy 1 time only. Encouraged to do home exercises and return to complete PT.   have reviewed inspect report, is as expected.  She is continuing hydrocodone up to 3 in a day and gabapentin at hs only.  Renewing medications  Medications Discontinued During This Encounter   Medication Reason    • hydrOXYzine (ATARAX) 25 MG tablet *Therapy completed   • gabapentin (NEURONTIN) 300 MG capsule Reorder   • glimepiride (AMARYL) 4 MG tablet Reorder   • HYDROcodone-acetaminophen (NORCO) 7.5-325 MG per tablet Reorder         Follow Up     Return in about 3 months (around 6/13/2023) for Medicare Wellness, pain managment, diabetes thyroid and labs.    Patient was given instructions and counseling regarding her condition or for health maintenance advice. Please see specific information pulled into the AVS if appropriate.

## 2023-03-13 ENCOUNTER — OFFICE VISIT (OUTPATIENT)
Dept: FAMILY MEDICINE CLINIC | Facility: CLINIC | Age: 68
End: 2023-03-13
Payer: MEDICARE

## 2023-03-13 VITALS
DIASTOLIC BLOOD PRESSURE: 76 MMHG | RESPIRATION RATE: 18 BRPM | TEMPERATURE: 98 F | HEIGHT: 62 IN | SYSTOLIC BLOOD PRESSURE: 130 MMHG | WEIGHT: 217.38 LBS | OXYGEN SATURATION: 94 % | HEART RATE: 102 BPM | BODY MASS INDEX: 40 KG/M2

## 2023-03-13 DIAGNOSIS — E66.01 CLASS 2 SEVERE OBESITY DUE TO EXCESS CALORIES WITH SERIOUS COMORBIDITY AND BODY MASS INDEX (BMI) OF 39.0 TO 39.9 IN ADULT: ICD-10-CM

## 2023-03-13 DIAGNOSIS — M51.36 DDD (DEGENERATIVE DISC DISEASE), LUMBAR: ICD-10-CM

## 2023-03-13 DIAGNOSIS — E03.9 ACQUIRED HYPOTHYROIDISM: ICD-10-CM

## 2023-03-13 DIAGNOSIS — I10 HYPERTENSION, ESSENTIAL: ICD-10-CM

## 2023-03-13 DIAGNOSIS — R79.89 ELEVATED TSH: ICD-10-CM

## 2023-03-13 DIAGNOSIS — M54.50 ACUTE MIDLINE LOW BACK PAIN WITHOUT SCIATICA: ICD-10-CM

## 2023-03-13 DIAGNOSIS — G89.29 MID BACK PAIN, CHRONIC: ICD-10-CM

## 2023-03-13 DIAGNOSIS — E11.40 TYPE 2 DIABETES MELLITUS WITH DIABETIC NEUROPATHY, WITHOUT LONG-TERM CURRENT USE OF INSULIN: Primary | ICD-10-CM

## 2023-03-13 DIAGNOSIS — M54.9 MID BACK PAIN, CHRONIC: ICD-10-CM

## 2023-03-13 DIAGNOSIS — E87.5 HYPERKALEMIA: ICD-10-CM

## 2023-03-13 LAB
EXPIRATION DATE: NORMAL
HBA1C MFR BLD: 8.3 %
Lab: NORMAL

## 2023-03-13 PROCEDURE — 99214 OFFICE O/P EST MOD 30 MIN: CPT | Performed by: FAMILY MEDICINE

## 2023-03-13 PROCEDURE — 3052F HG A1C>EQUAL 8.0%<EQUAL 9.0%: CPT | Performed by: FAMILY MEDICINE

## 2023-03-13 PROCEDURE — 1159F MED LIST DOCD IN RCRD: CPT | Performed by: FAMILY MEDICINE

## 2023-03-13 PROCEDURE — 83036 HEMOGLOBIN GLYCOSYLATED A1C: CPT | Performed by: FAMILY MEDICINE

## 2023-03-13 PROCEDURE — 3078F DIAST BP <80 MM HG: CPT | Performed by: FAMILY MEDICINE

## 2023-03-13 PROCEDURE — 3075F SYST BP GE 130 - 139MM HG: CPT | Performed by: FAMILY MEDICINE

## 2023-03-13 PROCEDURE — 1160F RVW MEDS BY RX/DR IN RCRD: CPT | Performed by: FAMILY MEDICINE

## 2023-03-13 RX ORDER — HYDROCODONE BITARTRATE AND ACETAMINOPHEN 7.5; 325 MG/1; MG/1
TABLET ORAL
Qty: 90 TABLET | Refills: 0 | Status: SHIPPED | OUTPATIENT
Start: 2023-03-13 | End: 2023-04-03 | Stop reason: SDUPTHER

## 2023-03-13 RX ORDER — GABAPENTIN 300 MG/1
600 CAPSULE ORAL NIGHTLY
Qty: 180 CAPSULE | Refills: 1 | Status: SHIPPED | OUTPATIENT
Start: 2023-03-13

## 2023-03-13 RX ORDER — LEVOTHYROXINE SODIUM 0.03 MG/1
25 TABLET ORAL DAILY
Qty: 90 TABLET | Refills: 1 | Status: SHIPPED | OUTPATIENT
Start: 2023-03-13

## 2023-03-13 RX ORDER — GLIMEPIRIDE 4 MG/1
4 TABLET ORAL 2 TIMES DAILY
Qty: 180 TABLET | Refills: 3 | Status: SHIPPED | OUTPATIENT
Start: 2023-03-13

## 2023-03-13 NOTE — ASSESSMENT & PLAN NOTE
Patient's (Body mass index is 39.76 kg/m².) indicates that they are morbidly/severely obese (BMI > 40 or > 35 with obesity - related health condition) with health conditions that include hypertension, diabetes mellitus and dyslipidemias . Weight is unchanged. BMI  is above average; BMI management plan is completed. We discussed portion control and increasing exercise.

## 2023-04-03 DIAGNOSIS — M54.50 ACUTE MIDLINE LOW BACK PAIN WITHOUT SCIATICA: ICD-10-CM

## 2023-04-03 DIAGNOSIS — M51.36 DDD (DEGENERATIVE DISC DISEASE), LUMBAR: ICD-10-CM

## 2023-04-03 NOTE — TELEPHONE ENCOUNTER
"  Caller: Suellen Rubin    Relationship: Self    Best call back number: [phone]    Requested Prescriptions:   Requested Prescriptions     Pending Prescriptions Disp Refills   • HYDROcodone-acetaminophen (NORCO) 7.5-325 MG per tablet 90 tablet 0     Si/2 to 1 tablet every 6 hours if needed for pain        Pharmacy where request should be sent: [facility]/PHARMACY #[id number] - [place], [place] - [address] [place] [address] [facility] [phone] [facility] [phone]      Last office visit with prescribing clinician: 3/13/2023   Last telemedicine visit with prescribing clinician: 2023   Next office visit with prescribing clinician: 2023     Additional details provided by patient: PATIENT HAS SIX DAYS LEFT    Does the patient have less than a 3 day supply:  [] Yes  [x] No    Would you like a call back once the refill request has been completed: [] Yes [x] No    If the office needs to give you a call back, can they leave a voicemail: [] Yes [x] No    Desiree Mccann Rep   23 13:49 EDT           "

## 2023-04-04 DIAGNOSIS — E11.65 TYPE 2 DIABETES MELLITUS WITH HYPERGLYCEMIA, WITHOUT LONG-TERM CURRENT USE OF INSULIN: ICD-10-CM

## 2023-04-07 RX ORDER — HYDROCODONE BITARTRATE AND ACETAMINOPHEN 7.5; 325 MG/1; MG/1
TABLET ORAL
Qty: 90 TABLET | Refills: 0 | Status: SHIPPED | OUTPATIENT
Start: 2023-04-07

## 2023-04-07 NOTE — TELEPHONE ENCOUNTER
Please inform patient I am renewing hydrocodone today she will need an appointment in 1 month for next refill and I see she has an appointment with pain management in June.

## 2023-04-21 DIAGNOSIS — I10 HYPERTENSION, ESSENTIAL: ICD-10-CM

## 2023-04-24 RX ORDER — LOSARTAN POTASSIUM 100 MG/1
TABLET ORAL
Qty: 90 TABLET | Refills: 0 | Status: SHIPPED | OUTPATIENT
Start: 2023-04-24

## 2023-04-29 DIAGNOSIS — E11.65 TYPE 2 DIABETES MELLITUS WITH HYPERGLYCEMIA, WITHOUT LONG-TERM CURRENT USE OF INSULIN: ICD-10-CM

## 2023-05-03 DIAGNOSIS — M51.36 DDD (DEGENERATIVE DISC DISEASE), LUMBAR: ICD-10-CM

## 2023-05-03 DIAGNOSIS — M54.50 ACUTE MIDLINE LOW BACK PAIN WITHOUT SCIATICA: ICD-10-CM

## 2023-05-03 RX ORDER — HYDROCODONE BITARTRATE AND ACETAMINOPHEN 7.5; 325 MG/1; MG/1
TABLET ORAL
Qty: 90 TABLET | Refills: 0 | Status: SHIPPED | OUTPATIENT
Start: 2023-05-03

## 2023-05-03 NOTE — TELEPHONE ENCOUNTER
"Caller: Suellen Rubin    Relationship: Self    Best call back number: [phone]    Requested Prescriptions:   Requested Prescriptions     Pending Prescriptions Disp Refills   • HYDROcodone-acetaminophen (NORCO) 7.5-325 MG per tablet 90 tablet 0     Si/2 to 1 tablet every 6 hours if needed for pain        Pharmacy where request should be sent: [facility]/PHARMACY #[id number] - [place] - [address] [place] [address] [facility] [phone] [facility] [phone]      Last office visit with prescribing clinician: 3/13/2023   Last telemedicine visit with prescribing clinician: 2023   Next office visit with prescribing clinician: 2023     Additional details provided by patient: PATIENT HAS 3 DAYS     Does the patient have less than a 3 day supply:  [x] Yes  [] No    Desiree Richardson Rep   23 11:21 EDT   "

## 2023-05-09 DIAGNOSIS — M51.36 DDD (DEGENERATIVE DISC DISEASE), LUMBAR: ICD-10-CM

## 2023-05-09 DIAGNOSIS — M54.50 ACUTE MIDLINE LOW BACK PAIN WITHOUT SCIATICA: ICD-10-CM

## 2023-05-09 RX ORDER — HYDROCODONE BITARTRATE AND ACETAMINOPHEN 7.5; 325 MG/1; MG/1
TABLET ORAL
Qty: 90 TABLET | Refills: 0 | Status: SHIPPED | OUTPATIENT
Start: 2023-05-09

## 2023-05-09 NOTE — TELEPHONE ENCOUNTER
Caller: Suellen Rubin    Relationship: Self    Best call back number: [phone]     Requested Prescriptions:   Requested Prescriptions     Pending Prescriptions Disp Refills   • HYDROcodone-acetaminophen (NORCO) 7.5-325 MG per tablet 90 tablet 0     Si/2 to 1 tablet every 6 hours if needed for pain        Pharmacy where request should be sent:        [facility]  [phone]        Last office visit with prescribing clinician: 3/13/2023   Last telemedicine visit with prescribing clinician: 5/3/2023   Next office visit with prescribing clinician: 2023     Additional details provided by patient:     THE OTHER PHARMACY DOES NOT HAVE THIS MEDICATION IN STOCK AND DOES NOT KNOW WHEN THE WILL GET IT IN SO PATIENT WANT TO CHANGE PHARMACY.          Does the patient have less than a 3 day supply:  [x] Yes  [] No    Would you like a call back once the refill request has been completed: [] Yes [] No    If the office needs to give you a call back, can they leave a voicemail: [] Yes [] No    Desiree Caputo Rep   23 15:03 EDT

## 2023-05-30 ENCOUNTER — TELEPHONE (OUTPATIENT)
Dept: FAMILY MEDICINE CLINIC | Facility: CLINIC | Age: 68
End: 2023-05-30

## 2023-05-30 DIAGNOSIS — L29.9 PRURITIC CONDITION: ICD-10-CM

## 2023-05-30 DIAGNOSIS — M51.36 DDD (DEGENERATIVE DISC DISEASE), LUMBAR: Primary | ICD-10-CM

## 2023-05-30 DIAGNOSIS — M54.50 ACUTE MIDLINE LOW BACK PAIN WITHOUT SCIATICA: ICD-10-CM

## 2023-05-30 DIAGNOSIS — M51.36 DDD (DEGENERATIVE DISC DISEASE), LUMBAR: ICD-10-CM

## 2023-05-30 RX ORDER — HYDROXYZINE HYDROCHLORIDE 25 MG/1
25 TABLET, FILM COATED ORAL 3 TIMES DAILY PRN
Qty: 90 TABLET | Refills: 0 | Status: SHIPPED | OUTPATIENT
Start: 2023-05-30

## 2023-05-30 RX ORDER — BACLOFEN 10 MG/1
10 TABLET ORAL 3 TIMES DAILY
Qty: 90 TABLET | Refills: 0 | Status: SHIPPED | OUTPATIENT
Start: 2023-05-30

## 2023-05-30 RX ORDER — HYDROCODONE BITARTRATE AND ACETAMINOPHEN 7.5; 325 MG/1; MG/1
TABLET ORAL
Qty: 90 TABLET | Refills: 0 | Status: SHIPPED | OUTPATIENT
Start: 2023-05-30

## 2023-05-30 NOTE — TELEPHONE ENCOUNTER
Caller: Suellen Rubin    Relationship: Self    Best call back number: [phone]    Requested Prescriptions:   Requested Prescriptions     Pending Prescriptions Disp Refills   • HYDROcodone-acetaminophen (NORCO) 7.5-325 MG per tablet 90 tablet 0     Si/2 to 1 tablet every 6 hours if needed for pain        Pharmacy where request should be sent: [email or web address] [email or web address] - [address] [facility] [phone] [facility] [phone]      Last office visit with prescribing clinician: 3/13/2023   Last telemedicine visit with prescribing clinician: Visit date not found   Next office visit with prescribing clinician: 2023     Additional details provided by patient: PATIENT HAS 8 DAYS LEFT    Does the patient have less than a 3 day supply:  [] Yes  [x] No    Desiree Richardson Rep   23 11:01 EDT

## 2023-05-30 NOTE — TELEPHONE ENCOUNTER
Caller: Suellen Rubin    Relationship: Self    Best call back number: [phone]    What medication are you requesting: HYDROXYZINE 25 MG AND BACLOFEN 10 MG    If a prescription is needed, what is your preferred pharmacy and phone number: [email or web address] - [place], [place] - [address]  - [phone]  - [phone] FX     Additional notes: PATIENT STATES SHE HASN'T HAD THE BACLOFEN FILLED IN A WHILE BUT SHE IS REQUESTING REFILLS ON BOTH MEDICATIONS. NEITHER ON PATIENT'S CURRENT MED LIST.    PLEASE ADVISE

## 2023-06-09 ENCOUNTER — OFFICE VISIT (OUTPATIENT)
Dept: PAIN MEDICINE | Facility: CLINIC | Age: 68
End: 2023-06-09
Payer: MEDICARE

## 2023-06-09 VITALS
DIASTOLIC BLOOD PRESSURE: 67 MMHG | SYSTOLIC BLOOD PRESSURE: 141 MMHG | RESPIRATION RATE: 16 BRPM | OXYGEN SATURATION: 94 % | HEART RATE: 95 BPM

## 2023-06-09 DIAGNOSIS — M54.50 ACUTE MIDLINE LOW BACK PAIN WITHOUT SCIATICA: ICD-10-CM

## 2023-06-09 DIAGNOSIS — Z79.899 HIGH RISK MEDICATION USE: Primary | ICD-10-CM

## 2023-06-09 DIAGNOSIS — M47.816 SPONDYLOSIS OF LUMBAR REGION WITHOUT MYELOPATHY OR RADICULOPATHY: ICD-10-CM

## 2023-06-09 DIAGNOSIS — M51.36 DDD (DEGENERATIVE DISC DISEASE), LUMBAR: ICD-10-CM

## 2023-06-09 PROCEDURE — G0463 HOSPITAL OUTPT CLINIC VISIT: HCPCS | Performed by: STUDENT IN AN ORGANIZED HEALTH CARE EDUCATION/TRAINING PROGRAM

## 2023-06-09 RX ORDER — NYSTATIN 100000 [USP'U]/G
POWDER TOPICAL
COMMUNITY
Start: 2023-05-10

## 2023-06-09 RX ORDER — HYDROCODONE BITARTRATE AND ACETAMINOPHEN 7.5; 325 MG/1; MG/1
1 TABLET ORAL EVERY 8 HOURS PRN
Qty: 90 TABLET | Refills: 0 | Status: SHIPPED | OUTPATIENT
Start: 2023-06-09

## 2023-06-09 NOTE — PROGRESS NOTES
CHIEF COMPLAINT  Chief Complaint   Patient presents with   • Back Pain     New patient referred for DDD, Lumbar and left knee pain Treated w/Hydrocodone 06/09 @ 1 pm        Primary Care  Julissa Butler MD    Subjective   Suellen Rubin is a 68 y.o. female  who presents for chronic axial low back pain.  She reports that she has had longstanding low back pain.  She reports that she is previously treated this pain with a combination of NSAIDs as well as narcotics.  She states that she was previously taking NSAIDs however her primary care stopped her NSAIDs and switch from narcotics.  It appears that she has been progressively escalating her narcotic dosage over the past year.  She is currently taking approximately 3 hydrocodone's daily.  She reports primarily axial back pain without any significant radicular pain.  She reports the pain is worse with any type of movement physical activity and improves with rest and medication.  She denies any red flag symptoms such as loss of bowel or bladder or any saddle anesthesia.  She does have plain films which do show facet arthropathy especially in the lower lumbar segments    History of Present Illness     Location: Axial low back  Onset: Years ago  Duration: Slowly worsening  Timing: Constant throughout the day  Quality: Sharp, stabbing  Severity: Today: 6       Last Week: 6       Worst: 7  Modifying Factors: The pain is worse with movement and physical activity and improves with medication rest  Functional Deficit: The pain makes it difficult for her to perform her activities of daily living    Physical Therapy: no    Interval Update 06/09/2023:     The following portions of the patient's history were reviewed and updated as appropriate: allergies, current medications, past family history, past medical history, past social history, past surgical history and problem list.    Procedures:  1.       Current Outpatient Medications:   •  Accu-Chek Guide test strip,  USE AS INSTRUCTED, Disp: 50 each, Rfl: 12  •  atorvastatin (LIPITOR) 20 MG tablet, , Disp: , Rfl:   •  baclofen (LIORESAL) 10 MG tablet, Take 1 tablet by mouth 3 (Three) Times a Day., Disp: 90 tablet, Rfl: 0  •  [facility] Vitamin B-12 1000 MCG tablet, TAKE 1 TABLET BY MOUTH EVERY DAY, Disp: 90 tablet, Rfl: 1  •  gabapentin (NEURONTIN) 300 MG capsule, Take 2 capsules by mouth Every Night., Disp: 180 capsule, Rfl: 1  •  glimepiride (AMARYL) 4 MG tablet, Take 1 tablet by mouth 2 (Two) Times a Day., Disp: 180 tablet, Rfl: 3  •  HYDROcodone-acetaminophen (NORCO) 7.5-325 MG per tablet, Take 1 tablet by mouth Every 8 (Eight) Hours As Needed for Moderate Pain., Disp: 90 tablet, Rfl: 0  •  hydrOXYzine (ATARAX) 25 MG tablet, Take 1 tablet by mouth 3 (Three) Times a Day As Needed for Itching., Disp: 90 tablet, Rfl: 0  •  Januvia 100 MG tablet, TAKE 1 TABLET BY MOUTH EVERY DAY, Disp: 90 tablet, Rfl: 3  •  levothyroxine (SYNTHROID, LEVOTHROID) 25 MCG tablet, Take 1 tablet by mouth Daily., Disp: 90 tablet, Rfl: 1  •  losartan (COZAAR) 100 MG tablet, TAKE 1 TABLET BY MOUTH EVERY DAY, Disp: 90 tablet, Rfl: 0  •  metFORMIN (GLUCOPHAGE) 500 MG tablet, TAKE 2 TABLETS BY MOUTH 2 TIMES A DAY WITH MEALS., Disp: 120 tablet, Rfl: 12  •  nystatin (MYCOSTATIN) 469015 UNIT/GM powder, apply topically TO appropriate AREA as directed 3 times daily, Disp: , Rfl:   •  ondansetron (ZOFRAN) 4 MG tablet, TAKE 1 TABLET BY MOUTH EVERY 8 HOURS AS NEEDED FOR NAUSEA OR VOMITING., Disp: 20 tablet, Rfl: 1    Review of Systems   Musculoskeletal: Positive for arthralgias, back pain, gait problem and myalgias. Negative for joint swelling, neck pain and neck stiffness.   Neurological: Negative for weakness and numbness.       Vitals:    06/09/23 1341   BP: 141/67   Pulse: 95   Resp: 16   SpO2: 94%   PainSc:   6       Urine Drug Screen: 6/9/2023  Appropriate: Pending    Objective   Physical Exam  Vitals and nursing note reviewed.   Constitutional:       General: She  is not in acute distress.     Appearance: Normal appearance. She is well-developed and normal weight.   Neck:      Trachea: No tracheal deviation.   Musculoskeletal:      Comments: Lumbar Spine Exam:  Tender to palpation over the lumbar paraspinal musculature Yes  Limited range of motion secondary to pain Yes  Facet loading positive: bilateral  Facets tender to palpation: bilateral  Straight leg raise test positive: Negative       Neurological:      General: No focal deficit present.      Mental Status: She is alert.      Sensory: No sensory deficit.      Motor: No weakness.           Assessment & Plan   Problems Addressed this Visit        Other    DDD (degenerative disc disease), lumbar    Relevant Medications    HYDROcodone-acetaminophen (NORCO) 7.5-325 MG per tablet    Other Relevant Orders    Ambulatory Referral to Physical Therapy Evaluate and treat   Other Visit Diagnoses     High risk medication use    -  Primary    Relevant Orders    Urine Drug Screen - Urine, Clean Catch    Spondylosis of lumbar region without myelopathy or radiculopathy        Relevant Orders    Ambulatory Referral to Physical Therapy Evaluate and treat    Facet    Acute midline low back pain without sciatica        Relevant Medications    HYDROcodone-acetaminophen (NORCO) 7.5-325 MG per tablet      Diagnoses       Codes Comments    High risk medication use    -  Primary ICD-10-CM: Z79.899  ICD-9-CM: V58.69     Spondylosis of lumbar region without myelopathy or radiculopathy     ICD-10-CM: M47.816  ICD-9-CM: 721.3     DDD (degenerative disc disease), lumbar     ICD-10-CM: M51.36  ICD-9-CM: 722.52     Acute midline low back pain without sciatica     ICD-10-CM: M54.50  ICD-9-CM: 724.2           Plan:  1. Plan for bilateral two-level lumbar medial branch blocks.  She has very significant spondylitic pain and is incredibly stiff even with slight range of motion  2. She has previously been referred to physical therapy but has not followed up.   I encouraged her to repeat physical therapy.  We will send in a new referral  3. Continue hydrocodone 7.5 mg 3 times daily  4. UDS and contract today.  Inspect appropriate  5. Ideally, she would benefit from NSAID therapy however her creatinine is borderline.  If we can improve her range of motion with a combination of medial branch blocks and physical therapy, she will likely respond very well.  Overall, her plain films do not show extensive degenerative change and I feel that she has a very significant component of myofascial pain in addition to her spondylosis.  She does have a prescription for baclofen however she states that she only takes the medication very rarely.  --- Follow-up next available for bilateral two-level lumbar medial branch blocks           INSPECT REPORT    As part of the patient's treatment plan, I may be prescribing controlled substances. The patient has been made aware of appropriate use of such medications, including potential risk of somnolence, limited ability to drive and/or work safely, and the potential for dependence or overdose. It has also bee made clear that these medications are for use by this patient only, without concomitant use of alcohol or other substances unless prescribed.     Patient has completed prescribing agreement detailing terms of continued prescribing of controlled substances, including monitoring ROBYN reports, urine drug screening, and pill counts if necessary. The patient is aware that inappropriate use will results in cessation of prescribing such medications.    INSPECT report has been reviewed and scanned into the patient's chart.    As the clinician, I personally reviewed the INSPECT from 6/7/2023.    History and physical exam exhibit continued safe and appropriate use of controlled substances.      EMR Dragon/Transcription disclaimer:   Much of this encounter note is an electronic transcription/translation of spoken language to printed text. The  electronic translation of spoken language may permit erroneous, or at times, nonsensical words or phrases to be inadvertently transcribed; Although I have reviewed the note for such errors, some may still exist.

## 2023-06-14 DIAGNOSIS — Z79.899 HIGH RISK MEDICATION USE: ICD-10-CM

## 2023-06-22 PROBLEM — G89.29 MID BACK PAIN, CHRONIC: Status: ACTIVE | Noted: 2023-06-22

## 2023-06-22 PROBLEM — M54.9 MID BACK PAIN, CHRONIC: Status: ACTIVE | Noted: 2023-06-22

## 2023-07-26 ENCOUNTER — TREATMENT (OUTPATIENT)
Dept: PHYSICAL THERAPY | Facility: CLINIC | Age: 68
End: 2023-07-26
Payer: MEDICARE

## 2023-07-26 DIAGNOSIS — G89.29 CHRONIC MIDLINE THORACIC BACK PAIN: ICD-10-CM

## 2023-07-26 DIAGNOSIS — M54.6 CHRONIC MIDLINE THORACIC BACK PAIN: ICD-10-CM

## 2023-07-26 DIAGNOSIS — M53.3 PAIN OF RIGHT SACROILIAC JOINT: Primary | ICD-10-CM

## 2023-07-26 DIAGNOSIS — R53.1 WEAKNESS GENERALIZED: ICD-10-CM

## 2023-07-26 DIAGNOSIS — G89.29 CHRONIC MIDLINE LOW BACK PAIN WITHOUT SCIATICA: ICD-10-CM

## 2023-07-26 DIAGNOSIS — M54.50 CHRONIC MIDLINE LOW BACK PAIN WITHOUT SCIATICA: ICD-10-CM

## 2023-07-26 NOTE — PROGRESS NOTES
Physical Therapy Daily Treatment Note  [address] [facility] [address], [address], [place]  [address]    Patient: Suellen Rubin   : 1955  Diagnosis/ICD-10 Code:  Pain of right sacroiliac joint [M53.3]   Problems Addressed this Visit    None  Visit Diagnoses       Pain of right sacroiliac joint    -  Primary    Chronic midline low back pain without sciatica        Weakness generalized        Chronic midline thoracic back pain              Diagnoses         Codes Comments    Pain of right sacroiliac joint    -  Primary ICD-10-CM: M53.3  ICD-9-CM: 724.6     Chronic midline low back pain without sciatica     ICD-10-CM: M54.50, G89.29  ICD-9-CM: 724.2, 338.29     Weakness generalized     ICD-10-CM: R53.1  ICD-9-CM: 780.79     Chronic midline thoracic back pain     ICD-10-CM: M54.6, G89.29  ICD-9-CM: 724.1, 338.29           Referring practitioner: Roberto Recinos MD  Date of Initial Visit: Type: THERAPY  Noted: 2023  Today's Date: 2023    VISIT#: 2    Subjective   Suellen reports she's been having a lot of domestic problems with her grandson and his family which has caused her stress. She hasn't really been able to focus on HEP bc of that.     Objective     See Exercise, Manual, and Modality Logs for complete treatment.     Assessment/Plan  Suellen tolerated all exercises today without inc'd pain. She was encouraged to cont w/HEP and was provided with printout of roll for control along with red resistance band.   Progress strengthening /stabilization /functional activity         Timed:         Manual Therapy:         mins  14705;     Therapeutic Exercise:    30     mins  86427;     Neuromuscular Andrew:    25    mins  02365;    Therapeutic Activity:          mins  18108;     Gait Training:           mins  44507;     Ultrasound:          mins  97968;    Ionto                                   mins   55512  Self Care                            mins   48630  Canalith Repos                   mins  95855  Tests &  Measures              mins   23979      Un-Timed:  Electrical Stimulation:         mins  03197 ( );  Dry Needling          mins 03573/82792  Traction          mins 77378  Low Eval          Mins  44400  Mod Eval          Mins  14930  High Eval                            Mins  92146  Re-Eval                               mins  45321    Timed Treatment:   55   mins   Total Treatment:     55   mins    Verito Gil, PT, DPT, cert. DN  Physical Therapist  [place] Lic # [id number]

## 2023-07-31 DIAGNOSIS — I10 HYPERTENSION, ESSENTIAL: ICD-10-CM

## 2023-07-31 DIAGNOSIS — M51.36 DDD (DEGENERATIVE DISC DISEASE), LUMBAR: ICD-10-CM

## 2023-07-31 DIAGNOSIS — M54.50 ACUTE MIDLINE LOW BACK PAIN WITHOUT SCIATICA: ICD-10-CM

## 2023-07-31 RX ORDER — HYDROCODONE BITARTRATE AND ACETAMINOPHEN 7.5; 325 MG/1; MG/1
1 TABLET ORAL EVERY 8 HOURS PRN
Qty: 90 TABLET | Refills: 0 | Status: SHIPPED | OUTPATIENT
Start: 2023-07-31

## 2023-07-31 RX ORDER — LOSARTAN POTASSIUM 100 MG/1
TABLET ORAL
Qty: 90 TABLET | Refills: 0 | Status: SHIPPED | OUTPATIENT
Start: 2023-07-31

## 2023-08-02 ENCOUNTER — TELEPHONE (OUTPATIENT)
Dept: PHYSICAL THERAPY | Facility: CLINIC | Age: 68
End: 2023-08-02

## 2023-08-18 NOTE — PROGRESS NOTES
"Chief Complaint  Diabetes    History of Present Illness  Suellen Rubin presents today for diabetes follow up.     Diabetes  Patient does not check blood sugar at home.  Associated symptoms include None  Per patient current diet is Variety of foods.  Patient does not avoid concentrated sweets.  Patient does not see podiatry.  Patient is overdue for vision exam. Scheduled with [facility] end of this week.       Current Outpatient Medications on File Prior to Visit   Medication Sig    Accu-Chek Guide test strip USE AS INSTRUCTED    atorvastatin (LIPITOR) 20 MG tablet     baclofen (LIORESAL) 10 MG tablet Take 1 tablet by mouth 3 (Three) Times a Day.    [facility] Vitamin B-12 1000 MCG tablet TAKE 1 TABLET BY MOUTH EVERY DAY    gabapentin (NEURONTIN) 300 MG capsule Take 2 capsules by mouth Every Night.    glimepiride (AMARYL) 4 MG tablet Take 1 tablet by mouth 2 (Two) Times a Day.    HYDROcodone-acetaminophen (NORCO) 7.5-325 MG per tablet Take 1 tablet by mouth Every 8 (Eight) Hours As Needed for Moderate Pain.    hydrOXYzine (ATARAX) 25 MG tablet Take 1 tablet by mouth 3 (Three) Times a Day As Needed for Itching.    Januvia 100 MG tablet TAKE 1 TABLET BY MOUTH EVERY DAY    levothyroxine (SYNTHROID, LEVOTHROID) 25 MCG tablet Take 1 tablet by mouth Daily.    losartan (COZAAR) 100 MG tablet Take 1 tablet by mouth Daily for 90 days.    metFORMIN (GLUCOPHAGE) 500 MG tablet TAKE 2 TABLETS BY MOUTH 2 TIMES A DAY WITH MEALS.    nystatin (MYCOSTATIN) 896664 UNIT/GM powder apply topically TO appropriate AREA as directed 3 times daily    ondansetron (ZOFRAN) 4 MG tablet TAKE 1 TABLET BY MOUTH EVERY 8 HOURS AS NEEDED FOR NAUSEA OR VOMITING.     No current facility-administered medications on file prior to visit.       Objective   Vital Signs:   /72 (BP Location: Right arm, Patient Position: Sitting, Cuff Size: Adult)   Pulse 90   Temp 97.8 øF (36.6 øC) (Temporal)   Resp 18   Ht 157.5 cm (62\")   Wt 96.6 kg (213 lb)  "  SpO2 96% Comment: room air  BMI 38.96 kg/mý      Patient Care Team:  Julissa Butler MD as PCP - General (Family Medicine)     Physical Exam  Vitals and nursing note reviewed.   Constitutional:       General: She is not in acute distress.     Appearance: Normal appearance. She is well-developed. She is obese. She is not ill-appearing.   HENT:      Head: Normocephalic and atraumatic.   Cardiovascular:      Rate and Rhythm: Normal rate and regular rhythm.      Heart sounds: No murmur heard.  Pulmonary:      Effort: Pulmonary effort is normal.      Breath sounds: Normal breath sounds. No wheezing.   Musculoskeletal:         General: Normal range of motion.      Comments: Stiff when rising from chair and somewhat waddling antalgic gait initially   Skin:     General: Skin is warm and dry.      Findings: No rash.   Neurological:      Mental Status: She is alert and oriented to person, place, and time.          No visits with results within 1 Day(s) from this visit.   Latest known visit with results is:   Orders Only on 06/21/2023   Component Date Value Ref Range Status    Free T4 06/21/2023 1.27  0.82 - 1.77 ng/dL Final    T3, Free 06/21/2023 2.9  2.0 - 4.4 pg/mL Final    TSH 06/21/2023 3.980  0.450 - 4.500 uIU/mL Final    Glucose 06/21/2023 214 (H)  70 - 99 mg/dL Final    BUN 06/21/2023 12  8 - 27 mg/dL Final    Creatinine 06/21/2023 1.03 (H)  0.57 - 1.00 mg/dL Final    EGFR Result 06/21/2023 58 (L)  >59 mL/min/1.73 Final    BUN/Creatinine Ratio 06/21/2023 12  12 - 28 Final    Sodium 06/21/2023 137  134 - 144 mmol/L Final    Potassium 06/21/2023 4.6  3.5 - 5.2 mmol/L Final    Chloride 06/21/2023 100  96 - 106 mmol/L Final    Total CO2 06/21/2023 20  20 - 29 mmol/L Final    Calcium 06/21/2023 9.3  8.7 - 10.3 mg/dL Final    Total Protein 06/21/2023 8.2  6.0 - 8.5 g/dL Final    Albumin 06/21/2023 4.2  3.8 - 4.8 g/dL Final    Globulin 06/21/2023 4.0  1.5 - 4.5 g/dL Final    A/G Ratio 06/21/2023 1.1 (L)  1.2 -  2.2 Final    Total Bilirubin 06/21/2023 0.3  0.0 - 1.2 mg/dL Final    Alkaline Phosphatase 06/21/2023 147 (H)  44 - 121 IU/L Final    AST (SGOT) 06/21/2023 53 (H)  0 - 40 IU/L Final    ALT (SGPT) 06/21/2023 38 (H)  0 - 32 IU/L Final     A1C Last 3 Results          12/6/2022    09:51 3/13/2023    13:39 6/21/2023    13:29   HGBA1C Last 3 Results   Hemoglobin A1C 7.9  8.3  8.4      Lab Results   Component Value Date    CHLPL 144 12/06/2022    TRIG 241 (H) 12/06/2022    HDL 29 (L) 12/06/2022    LDL 75 12/06/2022     Lab Results   Component Value Date    TSH 3.980 06/21/2023     Lab Results   Component Value Date    GLUCOSE 214 (H) 06/21/2023    BUN 12 06/21/2023    CREATININE 1.03 (H) 06/21/2023    EGFRIFNONA 72 12/15/2021    EGFRIFAFRI 83 12/15/2021    BCR 12 06/21/2023    K 4.6 06/21/2023    CO2 20 06/21/2023    CALCIUM 9.3 06/21/2023    PROTENTOTREF 8.2 06/21/2023    ALBUMIN 4.2 06/21/2023    LABIL2 1.1 (L) 06/21/2023    AST 53 (H) 06/21/2023    ALT 38 (H) 06/21/2023     Lab Results   Component Value Date    WBC 7.1 12/06/2022    HGB 14.2 12/06/2022    HCT 42.4 12/06/2022    MCV 86 12/06/2022     12/06/2022                      Assessment and Plan    Diagnoses and all orders for this visit:    1. Type 2 diabetes mellitus with diabetic neuropathy, without long-term current use of insulin (Primary)  -     Hemoglobin A1c; Future    2. Class 2 severe obesity due to excess calories with serious comorbidity and body mass index (BMI) of 38.0 to 38.9 in adult  Assessment & Plan:  Patient's (Body mass index is 38.96 kg/mý.) indicates that they are morbidly/severely obese (BMI > 40 or > 35 with obesity - related health condition) with health conditions that include hypertension, diabetes mellitus, and dyslipidemias . Weight is unchanged. BMI  is above average; BMI management plan is completed. We discussed portion control and increasing exercise.       3. Former smoker    4. Hypertension, essential  -     Comprehensive  Metabolic Panel; Future  -     CBC & Differential; Future    5. Acquired hypothyroidism  -     TSH; Future  -     T4, Free; Future    6. Diabetic peripheral neuropathy    7. Elevated LFTs    8. Mixed hyperlipidemia  -     Lipid Panel; Future      Diabetes, A1c is above goal at 8.4.  Patient is on maximum dose of 4 oral hypoglycemics including metformin, Januvia, and Amaryl.  Stressed need better dietary control and increasing physical activity. Will concentrate on reducing sugars in drinks.  She declines referral to dietitian or .    Hypothyroidism at goal on low-dose levothyroxine, continue 25 mcg daily.    Hypertension goal continue Cozaar 100 mg daily.    Chronic low back pain from degenerative disc disease currently under treatment with Dr. Recinos, Injections did help but only for 2-3 weeks. PT not very helpful would not do the home exercises. Currently taking hydrocodone 7.5 mg. 2-3/  day. Missed last appt have called to reschedule.     Foot pain from neuropathy adequately controlled with nightly gabapentin.     There are no discontinued medications.      Follow Up     Return in about 2 months (around 10/22/2023) for  Medicare Wellness, Recheck DM etc. Labs prior    Patient was given instructions and counseling regarding her condition or for health maintenance advice. Please see specific information pulled into the AVS if appropriate.

## 2023-08-21 DIAGNOSIS — I10 HYPERTENSION, ESSENTIAL: ICD-10-CM

## 2023-08-21 RX ORDER — LOSARTAN POTASSIUM 100 MG/1
100 TABLET ORAL DAILY
Qty: 90 TABLET | Refills: 0 | Status: SHIPPED | OUTPATIENT
Start: 2023-08-21 | End: 2023-11-19

## 2023-08-21 NOTE — TELEPHONE ENCOUNTER
Caller: Suellen Rubin    Relationship: Self    Best call back number: [phone]     Requested Prescriptions:   Requested Prescriptions     Pending Prescriptions Disp Refills    losartan (COZAAR) 100 MG tablet 90 tablet 0     Sig: Take 1 tablet by mouth Daily.        Pharmacy where request should be sent: [facility] - [place], [place] - [address]  - [phone]  - [phone]     Last office visit with prescribing clinician: 6/21/2023   Last telemedicine visit with prescribing clinician: Visit date not found   Next office visit with prescribing clinician: 8/22/2023     Additional details provided by patient: REQUESTING 90 SUPPLY    Does the patient have less than a 3 day supply:  [x] Yes  [] No    Desiree Rey Rep   08/21/23 09:34 EDT

## 2023-08-22 ENCOUNTER — OFFICE VISIT (OUTPATIENT)
Dept: FAMILY MEDICINE CLINIC | Facility: CLINIC | Age: 68
End: 2023-08-22
Payer: MEDICARE

## 2023-08-22 VITALS
DIASTOLIC BLOOD PRESSURE: 72 MMHG | RESPIRATION RATE: 18 BRPM | HEIGHT: 62 IN | BODY MASS INDEX: 39.2 KG/M2 | OXYGEN SATURATION: 96 % | HEART RATE: 90 BPM | SYSTOLIC BLOOD PRESSURE: 136 MMHG | TEMPERATURE: 97.8 F | WEIGHT: 213 LBS

## 2023-08-22 DIAGNOSIS — E11.40 TYPE 2 DIABETES MELLITUS WITH DIABETIC NEUROPATHY, WITHOUT LONG-TERM CURRENT USE OF INSULIN: Primary | ICD-10-CM

## 2023-08-22 DIAGNOSIS — E78.2 MIXED HYPERLIPIDEMIA: ICD-10-CM

## 2023-08-22 DIAGNOSIS — I10 HYPERTENSION, ESSENTIAL: ICD-10-CM

## 2023-08-22 DIAGNOSIS — E66.01 CLASS 2 SEVERE OBESITY DUE TO EXCESS CALORIES WITH SERIOUS COMORBIDITY AND BODY MASS INDEX (BMI) OF 38.0 TO 38.9 IN ADULT: ICD-10-CM

## 2023-08-22 DIAGNOSIS — E03.9 ACQUIRED HYPOTHYROIDISM: ICD-10-CM

## 2023-08-22 DIAGNOSIS — E11.42 DIABETIC PERIPHERAL NEUROPATHY: ICD-10-CM

## 2023-08-22 DIAGNOSIS — Z87.891 FORMER SMOKER: ICD-10-CM

## 2023-08-22 DIAGNOSIS — R79.89 ELEVATED LFTS: ICD-10-CM

## 2023-08-22 NOTE — ASSESSMENT & PLAN NOTE
Patient's (Body mass index is 38.96 kg/mý.) indicates that they are morbidly/severely obese (BMI > 40 or > 35 with obesity - related health condition) with health conditions that include hypertension, diabetes mellitus, and dyslipidemias . Weight is unchanged. BMI  is above average; BMI management plan is completed. We discussed portion control and increasing exercise.

## 2023-08-28 DIAGNOSIS — M54.50 ACUTE MIDLINE LOW BACK PAIN WITHOUT SCIATICA: ICD-10-CM

## 2023-08-28 DIAGNOSIS — M51.36 DDD (DEGENERATIVE DISC DISEASE), LUMBAR: ICD-10-CM

## 2023-08-28 RX ORDER — HYDROCODONE BITARTRATE AND ACETAMINOPHEN 7.5; 325 MG/1; MG/1
1 TABLET ORAL EVERY 8 HOURS PRN
Qty: 90 TABLET | Refills: 0 | Status: SHIPPED | OUTPATIENT
Start: 2023-08-28

## 2023-08-29 ENCOUNTER — TELEPHONE (OUTPATIENT)
Dept: PAIN MEDICINE | Facility: CLINIC | Age: 68
End: 2023-08-29
Payer: MEDICARE

## 2023-08-29 NOTE — TELEPHONE ENCOUNTER
Caller: EVIE     Relationship: SELF     Best call back number: [phone]    Requested Prescriptions:   HYDROCODONE     Pharmacy where request should be sent:  [facility] [place]     Last office visit with prescribing clinician: 6/9/2023   Last telemedicine visit with prescribing clinician: Visit date not found   Next office visit with prescribing clinician: 9/14/2023       Does the patient have less than a 3 day supply:  [] Yes  [x] No    Would you like a call back once the refill request has been completed: [x] Yes [] No    If the office needs to give you a call back, can they leave a voicemail: [x] Yes [] No    Desiree Gutierrez Rep   08/29/23 10:06 EDT

## 2023-08-30 ENCOUNTER — DOCUMENTATION (OUTPATIENT)
Dept: PHYSICAL THERAPY | Facility: CLINIC | Age: 68
End: 2023-08-30

## 2023-08-30 DIAGNOSIS — M53.3 PAIN OF RIGHT SACROILIAC JOINT: Primary | ICD-10-CM

## 2023-09-01 DIAGNOSIS — M51.36 DDD (DEGENERATIVE DISC DISEASE), LUMBAR: ICD-10-CM

## 2023-09-01 DIAGNOSIS — E11.40 TYPE 2 DIABETES MELLITUS WITH DIABETIC NEUROPATHY, WITHOUT LONG-TERM CURRENT USE OF INSULIN: ICD-10-CM

## 2023-09-01 RX ORDER — GABAPENTIN 300 MG/1
600 CAPSULE ORAL NIGHTLY
Qty: 180 CAPSULE | Refills: 1 | Status: SHIPPED | OUTPATIENT
Start: 2023-09-01

## 2023-09-06 DIAGNOSIS — R79.89 ELEVATED TSH: ICD-10-CM

## 2023-09-06 RX ORDER — LEVOTHYROXINE SODIUM 0.03 MG/1
25 TABLET ORAL DAILY
Qty: 90 TABLET | Refills: 1 | Status: SHIPPED | OUTPATIENT
Start: 2023-09-06

## 2023-10-02 DIAGNOSIS — M51.36 DDD (DEGENERATIVE DISC DISEASE), LUMBAR: ICD-10-CM

## 2023-10-02 DIAGNOSIS — M54.50 ACUTE MIDLINE LOW BACK PAIN WITHOUT SCIATICA: ICD-10-CM

## 2023-10-02 RX ORDER — HYDROCODONE BITARTRATE AND ACETAMINOPHEN 7.5; 325 MG/1; MG/1
1 TABLET ORAL EVERY 8 HOURS PRN
Qty: 90 TABLET | Refills: 0 | Status: SHIPPED | OUTPATIENT
Start: 2023-10-02 | End: 2023-10-06 | Stop reason: SDUPTHER

## 2023-10-02 NOTE — TELEPHONE ENCOUNTER
Caller: EVIE ADLER     Relationship: PATIENT     Best call back number: [phone]    Requested Prescriptions:   Requested Prescriptions     Pending Prescriptions Disp Refills    HYDROcodone-acetaminophen (NORCO) 7.5-325 MG per tablet 90 tablet 0     Sig: Take 1 tablet by mouth Every 8 (Eight) Hours As Needed for Moderate Pain.        Pharmacy where request should be sent:    [facility] - [place], [place] - [address] [facility] - [phone]  - [phone] FX   [address] [facility] [place] [address]   Phone: [phone] Fax: [phone]       Last office visit with prescribing clinician: 6/9/2023   Last telemedicine visit with prescribing clinician: Visit date not found   Next office visit with prescribing clinician: Visit date not found     Additional details provided by patient: N/A    Does the patient have less than a 3 day supply:  [] Yes  [x] No    Would you like a call back once the refill request has been completed: [] Yes [x] No    If the office needs to give you a call back, can they leave a voicemail: [] Yes [x] No    Desiree Mcclellan Rep   10/02/23 10:39 EDT

## 2023-10-06 ENCOUNTER — OFFICE VISIT (OUTPATIENT)
Dept: PAIN MEDICINE | Facility: CLINIC | Age: 68
End: 2023-10-06
Payer: MEDICARE

## 2023-10-06 VITALS
SYSTOLIC BLOOD PRESSURE: 123 MMHG | RESPIRATION RATE: 16 BRPM | OXYGEN SATURATION: 93 % | DIASTOLIC BLOOD PRESSURE: 67 MMHG | HEART RATE: 85 BPM

## 2023-10-06 DIAGNOSIS — M54.50 ACUTE MIDLINE LOW BACK PAIN WITHOUT SCIATICA: ICD-10-CM

## 2023-10-06 DIAGNOSIS — M51.36 DDD (DEGENERATIVE DISC DISEASE), LUMBAR: ICD-10-CM

## 2023-10-06 DIAGNOSIS — M47.816 SPONDYLOSIS OF LUMBAR REGION WITHOUT MYELOPATHY OR RADICULOPATHY: Primary | ICD-10-CM

## 2023-10-06 PROCEDURE — 3078F DIAST BP <80 MM HG: CPT | Performed by: STUDENT IN AN ORGANIZED HEALTH CARE EDUCATION/TRAINING PROGRAM

## 2023-10-06 PROCEDURE — 3074F SYST BP LT 130 MM HG: CPT | Performed by: STUDENT IN AN ORGANIZED HEALTH CARE EDUCATION/TRAINING PROGRAM

## 2023-10-06 PROCEDURE — 1160F RVW MEDS BY RX/DR IN RCRD: CPT | Performed by: STUDENT IN AN ORGANIZED HEALTH CARE EDUCATION/TRAINING PROGRAM

## 2023-10-06 PROCEDURE — 1159F MED LIST DOCD IN RCRD: CPT | Performed by: STUDENT IN AN ORGANIZED HEALTH CARE EDUCATION/TRAINING PROGRAM

## 2023-10-06 PROCEDURE — 1125F AMNT PAIN NOTED PAIN PRSNT: CPT | Performed by: STUDENT IN AN ORGANIZED HEALTH CARE EDUCATION/TRAINING PROGRAM

## 2023-10-06 PROCEDURE — 99214 OFFICE O/P EST MOD 30 MIN: CPT | Performed by: STUDENT IN AN ORGANIZED HEALTH CARE EDUCATION/TRAINING PROGRAM

## 2023-10-06 PROCEDURE — G0463 HOSPITAL OUTPT CLINIC VISIT: HCPCS | Performed by: STUDENT IN AN ORGANIZED HEALTH CARE EDUCATION/TRAINING PROGRAM

## 2023-10-06 RX ORDER — HYDROCODONE BITARTRATE AND ACETAMINOPHEN 7.5; 325 MG/1; MG/1
1 TABLET ORAL EVERY 8 HOURS PRN
Qty: 90 TABLET | Refills: 0 | Status: SHIPPED | OUTPATIENT
Start: 2023-12-02

## 2023-10-06 RX ORDER — HYDROCODONE BITARTRATE AND ACETAMINOPHEN 7.5; 325 MG/1; MG/1
1 TABLET ORAL EVERY 8 HOURS PRN
Qty: 90 TABLET | Refills: 0 | Status: SHIPPED | OUTPATIENT
Start: 2023-11-03

## 2023-10-06 NOTE — PROGRESS NOTES
CHIEF COMPLAINT  Chief Complaint   Patient presents with    Back Pain     3 month f/u from medial branch block  CC  chronic axial low back pain  Treated w/Hydrocodone LD @ 10 am 10/06       Primary Care  Julissa Butler MD    Subjective   Suellen Rubin is a 68 y.o. female  who presents for chronic axial low back pain.  She reports that she has had longstanding low back pain.  She reports that she is previously treated this pain with a combination of NSAIDs as well as narcotics.  She states that she was previously taking NSAIDs however her primary care stopped her NSAIDs and switch from narcotics.  It appears that she has been progressively escalating her narcotic dosage over the past year.  She is currently taking approximately 3 hydrocodone's daily.  She reports primarily axial back pain without any significant radicular pain.  She reports the pain is worse with any type of movement physical activity and improves with rest and medication.  She denies any red flag symptoms such as loss of bowel or bladder or any saddle anesthesia.  She does have plain films which do show facet arthropathy especially in the lower lumbar segments    History of Present Illness     Location: Axial low back  Onset: Years ago  Duration: Slowly worsening  Timing: Constant throughout the day  Quality: Sharp, stabbing  Severity: Today: 6       Last Week: 6       Worst: 7  Modifying Factors: The pain is worse with movement and physical activity and improves with medication rest  Functional Deficit: The pain makes it difficult for her to perform her activities of daily living    Physical Therapy: no    Interval Update 10/06/2023: She underwent a medial branch block several months ago and reports very good benefit for 3 to 4 days but unfortunately had a return of symptoms as before.  Continues taking hydrocodone approximately 3 times daily.    The following portions of the patient's history were reviewed and updated as  appropriate: allergies, current medications, past family history, past medical history, past social history, past surgical history and problem list.    Procedures:  7/6/2023: Bilateral L4-5, L5-S1 LMBB diagnostic: 90% benefit for 3 days      Current Outpatient Medications:     Accu-Chek Guide test strip, USE AS INSTRUCTED, Disp: 50 each, Rfl: 12    atorvastatin (LIPITOR) 20 MG tablet, , Disp: , Rfl:     baclofen (LIORESAL) 10 MG tablet, Take 1 tablet by mouth 3 (Three) Times a Day., Disp: 90 tablet, Rfl: 0    [facility] Vitamin B-12 1000 MCG tablet, TAKE 1 TABLET BY MOUTH EVERY DAY, Disp: 90 tablet, Rfl: 1    gabapentin (NEURONTIN) 300 MG capsule, Take 2 capsules by mouth Every Night., Disp: 180 capsule, Rfl: 1    glimepiride (AMARYL) 4 MG tablet, Take 1 tablet by mouth 2 (Two) Times a Day., Disp: 180 tablet, Rfl: 3    [START ON 11/3/2023] HYDROcodone-acetaminophen (NORCO) 7.5-325 MG per tablet, Take 1 tablet by mouth Every 8 (Eight) Hours As Needed for Moderate Pain., Disp: 90 tablet, Rfl: 0    hydrOXYzine (ATARAX) 25 MG tablet, Take 1 tablet by mouth 3 (Three) Times a Day As Needed for Itching., Disp: 90 tablet, Rfl: 0    Januvia 100 MG tablet, TAKE 1 TABLET BY MOUTH EVERY DAY, Disp: 90 tablet, Rfl: 3    levothyroxine (SYNTHROID, LEVOTHROID) 25 MCG tablet, Take 1 tablet by mouth Daily., Disp: 90 tablet, Rfl: 1    losartan (COZAAR) 100 MG tablet, Take 1 tablet by mouth Daily for 90 days., Disp: 90 tablet, Rfl: 0    metFORMIN (GLUCOPHAGE) 500 MG tablet, TAKE 2 TABLETS BY MOUTH 2 TIMES A DAY WITH MEALS., Disp: 120 tablet, Rfl: 12    nystatin (MYCOSTATIN) 896068 UNIT/GM powder, apply topically TO appropriate AREA as directed 3 times daily, Disp: , Rfl:     ondansetron (ZOFRAN) 4 MG tablet, TAKE 1 TABLET BY MOUTH EVERY 8 HOURS AS NEEDED FOR NAUSEA OR VOMITING., Disp: 20 tablet, Rfl: 1    [START ON 12/2/2023] HYDROcodone-acetaminophen (NORCO) 7.5-325 MG per tablet, Take 1 tablet by mouth Every 8 (Eight) Hours As Needed for  Moderate Pain., Disp: 90 tablet, Rfl: 0    Review of Systems   Musculoskeletal:  Positive for arthralgias, back pain, gait problem and myalgias. Negative for joint swelling, neck pain and neck stiffness.   Neurological:  Negative for weakness and numbness.     Vitals:    10/06/23 1514   BP: 123/67   BP Location: Right arm   Patient Position: Sitting   Cuff Size: Adult   Pulse: 85   Resp: 16   SpO2: 93%   PainSc:   5   PainLoc: Back       Urine Drug Screen: 6/9/2023  Appropriate: Yes    Objective   Physical Exam  Vitals and nursing note reviewed.   Constitutional:       General: She is not in acute distress.     Appearance: Normal appearance. She is well-developed and normal weight.   Neck:      Trachea: No tracheal deviation.   Musculoskeletal:      Comments: Lumbar Spine Exam:  Tender to palpation over the lumbar paraspinal musculature Yes  Limited range of motion secondary to pain Yes  Facet loading positive: bilateral  Facets tender to palpation: bilateral  Straight leg raise test positive: Negative       Neurological:      General: No focal deficit present.      Mental Status: She is alert.      Sensory: No sensory deficit.      Motor: No weakness.         Assessment & Plan   Problems Addressed this Visit          Other    DDD (degenerative disc disease), lumbar    Relevant Medications    HYDROcodone-acetaminophen (NORCO) 7.5-325 MG per tablet (Start on 11/3/2023)    HYDROcodone-acetaminophen (NORCO) 7.5-325 MG per tablet (Start on 12/2/2023)     Other Visit Diagnoses       Spondylosis of lumbar region without myelopathy or radiculopathy    -  Primary    Relevant Orders    Facet    Acute midline low back pain without sciatica        Relevant Medications    HYDROcodone-acetaminophen (NORCO) 7.5-325 MG per tablet (Start on 11/3/2023)    HYDROcodone-acetaminophen (NORCO) 7.5-325 MG per tablet (Start on 12/2/2023)          Diagnoses         Codes Comments    Spondylosis of lumbar region without myelopathy or  radiculopathy    -  Primary ICD-10-CM: M47.816  ICD-9-CM: 721.3     DDD (degenerative disc disease), lumbar     ICD-10-CM: M51.36  ICD-9-CM: 722.52     Acute midline low back pain without sciatica     ICD-10-CM: M54.50  ICD-9-CM: 724.2             Plan:  Plan for repeat diagnostic medial branch blocks  Encouraged her to continue with physical therapy  Continue hydrocodone 7.5 mg 3 times daily  UDS and inspect appropriate  Can plan for RFA after second diagnostic medial branch block if successful  --- Follow-up next available for bilateral two-level lumbar medial branch blocks L4/5, L5/S1           INSPECT REPORT    As part of the patient's treatment plan, I may be prescribing controlled substances. The patient has been made aware of appropriate use of such medications, including potential risk of somnolence, limited ability to drive and/or work safely, and the potential for dependence or overdose. It has also bee made clear that these medications are for use by this patient only, without concomitant use of alcohol or other substances unless prescribed.     Patient has completed prescribing agreement detailing terms of continued prescribing of controlled substances, including monitoring ROBYN reports, urine drug screening, and pill counts if necessary. The patient is aware that inappropriate use will results in cessation of prescribing such medications.    INSPECT report has been reviewed and scanned into the patient's chart.    As the clinician, I personally reviewed the INSPECT from 10/4/2023.    History and physical exam exhibit continued safe and appropriate use of controlled substances.      EMR Dragon/Transcription disclaimer:   Much of this encounter note is an electronic transcription/translation of spoken language to printed text. The electronic translation of spoken language may permit erroneous, or at times, nonsensical words or phrases to be inadvertently transcribed; Although I have reviewed the note  for such errors, some may still exist.

## 2023-10-23 DIAGNOSIS — E53.8 B12 DEFICIENCY: ICD-10-CM

## 2023-10-23 RX ORDER — NYSTATIN 100000 [USP'U]/G
POWDER TOPICAL 3 TIMES DAILY
Qty: 60 G | Refills: 3 | Status: SHIPPED | OUTPATIENT
Start: 2023-10-23

## 2023-10-23 NOTE — TELEPHONE ENCOUNTER
Caller: Suellen Rubin    Relationship: Self    Best call back number: [phone]     Requested Prescriptions:   Requested Prescriptions     Pending Prescriptions Disp Refills    cyanocobalamin ([facility] Vitamin B-12) 1000 MCG tablet 90 tablet 1     Sig: Take 1 tablet by mouth Daily.    nystatin (MYCOSTATIN) 269176 UNIT/GM powder          Pharmacy where request should be sent: [facility] - [place], [place] - [address] [facility] [phone] [facility] [phone]      Last office visit with prescribing clinician: 8/22/2023   Last telemedicine visit with prescribing clinician: Visit date not found   Next office visit with prescribing clinician: 10/25/2023     Does the patient have less than a 3 day supply:  [x] Yes  [] No    Would you like a call back once the refill request has been completed: [] Yes [x] No    If the office needs to give you a call back, can they leave a voicemail: [] Yes [x] No    Desiree Duran Rep   10/23/23 10:18 EDT

## 2023-10-25 ENCOUNTER — OFFICE VISIT (OUTPATIENT)
Dept: FAMILY MEDICINE CLINIC | Facility: CLINIC | Age: 68
End: 2023-10-25
Payer: MEDICARE

## 2023-10-25 VITALS
WEIGHT: 210 LBS | TEMPERATURE: 97.8 F | OXYGEN SATURATION: 92 % | HEIGHT: 62 IN | RESPIRATION RATE: 18 BRPM | SYSTOLIC BLOOD PRESSURE: 120 MMHG | BODY MASS INDEX: 38.64 KG/M2 | HEART RATE: 80 BPM | DIASTOLIC BLOOD PRESSURE: 70 MMHG

## 2023-10-25 DIAGNOSIS — Z12.31 ENCOUNTER FOR SCREENING MAMMOGRAM FOR BREAST CANCER: ICD-10-CM

## 2023-10-25 DIAGNOSIS — E66.01 CLASS 2 SEVERE OBESITY DUE TO EXCESS CALORIES WITH SERIOUS COMORBIDITY AND BODY MASS INDEX (BMI) OF 38.0 TO 38.9 IN ADULT: ICD-10-CM

## 2023-10-25 DIAGNOSIS — Z00.00 MEDICARE ANNUAL WELLNESS VISIT, SUBSEQUENT: Primary | ICD-10-CM

## 2023-10-25 DIAGNOSIS — E78.2 MIXED HYPERLIPIDEMIA: ICD-10-CM

## 2023-10-25 DIAGNOSIS — E11.40 TYPE 2 DIABETES MELLITUS WITH DIABETIC NEUROPATHY, WITHOUT LONG-TERM CURRENT USE OF INSULIN: ICD-10-CM

## 2023-10-25 DIAGNOSIS — E03.9 ACQUIRED HYPOTHYROIDISM: ICD-10-CM

## 2023-10-25 DIAGNOSIS — R79.89 ELEVATED LFTS: ICD-10-CM

## 2023-10-25 DIAGNOSIS — Z87.891 FORMER SMOKER: ICD-10-CM

## 2023-10-25 DIAGNOSIS — I10 HYPERTENSION, ESSENTIAL: ICD-10-CM

## 2023-10-25 DIAGNOSIS — Z78.0 MENOPAUSE: ICD-10-CM

## 2023-10-25 RX ORDER — LOPERAMIDE HYDROCHLORIDE 2 MG/1
2 CAPSULE ORAL 4 TIMES DAILY PRN
COMMUNITY

## 2023-10-25 NOTE — ASSESSMENT & PLAN NOTE
Patient's (Body mass index is 38.41 kg/m².) indicates that they are morbidly/severely obese (BMI > 40 or > 35 with obesity - related health condition) with health conditions that include hypertension, diabetes mellitus, and dyslipidemias . Weight is unchanged. BMI  is above average; BMI management plan is completed. We discussed portion control and increasing exercise.

## 2023-11-06 ENCOUNTER — TELEPHONE (OUTPATIENT)
Dept: PAIN MEDICINE | Facility: CLINIC | Age: 68
End: 2023-11-06
Payer: MEDICARE

## 2023-11-06 NOTE — TELEPHONE ENCOUNTER
Provider: DR VILA    Caller: EVIE ADLER    Relationship to Patient: SELF    Phone Number: [phone]    Reason for Call: NEEDS TO RESCHEDULE MEDIAL BRANCH BLOCK PROCEDURE.

## 2023-11-09 ENCOUNTER — HOSPITAL ENCOUNTER (OUTPATIENT)
Dept: GENERAL RADIOLOGY | Facility: HOSPITAL | Age: 68
Discharge: HOME | End: 2023-11-09
Payer: MEDICARE

## 2023-11-09 ENCOUNTER — HOSPITAL ENCOUNTER (OUTPATIENT)
Dept: PAIN MEDICINE | Facility: HOSPITAL | Age: 68
Discharge: HOME | End: 2023-11-09
Payer: MEDICARE

## 2023-11-09 VITALS
HEART RATE: 92 BPM | DIASTOLIC BLOOD PRESSURE: 84 MMHG | RESPIRATION RATE: 16 BRPM | SYSTOLIC BLOOD PRESSURE: 124 MMHG | OXYGEN SATURATION: 96 % | TEMPERATURE: 97.2 F

## 2023-11-09 DIAGNOSIS — M47.816 SPONDYLOSIS OF LUMBAR REGION WITHOUT MYELOPATHY OR RADICULOPATHY: ICD-10-CM

## 2023-11-09 DIAGNOSIS — M51.36 DDD (DEGENERATIVE DISC DISEASE), LUMBAR: Primary | ICD-10-CM

## 2023-11-09 DIAGNOSIS — R52 PAIN: ICD-10-CM

## 2023-11-09 PROCEDURE — 25510000001 IOPAMIDOL 41 % SOLUTION: Performed by: STUDENT IN AN ORGANIZED HEALTH CARE EDUCATION/TRAINING PROGRAM

## 2023-11-09 PROCEDURE — 25010000002 BUPIVACAINE (PF) 0.25 % SOLUTION: Performed by: STUDENT IN AN ORGANIZED HEALTH CARE EDUCATION/TRAINING PROGRAM

## 2023-11-09 PROCEDURE — 77003 FLUOROGUIDE FOR SPINE INJECT: CPT

## 2023-11-09 RX ORDER — BUPIVACAINE HYDROCHLORIDE 2.5 MG/ML
10 INJECTION, SOLUTION EPIDURAL; INFILTRATION; INTRACAUDAL ONCE
Status: COMPLETED | OUTPATIENT
Start: 2023-11-09 | End: 2023-11-09

## 2023-11-09 RX ORDER — IOPAMIDOL 408 MG/ML
3 INJECTION, SOLUTION INTRATHECAL
Status: COMPLETED | OUTPATIENT
Start: 2023-11-09 | End: 2023-11-09

## 2023-11-09 RX ADMIN — BUPIVACAINE HYDROCHLORIDE 10 ML: 2.5 INJECTION, SOLUTION EPIDURAL; INFILTRATION; INTRACAUDAL; PERINEURAL at 14:14

## 2023-11-09 RX ADMIN — IOPAMIDOL 3 ML: 408 INJECTION, SOLUTION INTRATHECAL at 14:14

## 2023-11-09 NOTE — PROCEDURES
Diagnostic Bilateral L3-5 Lumbar Medial Branch Blockade #2  [facility]       PREOPERATIVE DIAGNOSIS:  Lumbar spondylosis without myelopathy    POSTOPERATIVE DIAGNOSIS:  Lumbar spondylosis without myelopathy    PROCEDURE:   Diagnostic Bilateral Lumbar Medial Branch Nerve Blockades, with fluoroscopy:  L3, L4, and L5 nerves (at the L4 & L5 transverse processes and the sacral alar groove) to block facet joints L4-5, and L5-S1  55798-56 -- Bilateral Lumbar Facet blocks, 1st Level  09331-41 -- Bilateral Lumbar Facet blocks, 2nd  Level    PRE-PROCEDURE DISCUSSION WITH PATIENT:    Risks and complications were discussed with the patient prior to starting the procedure and informed consent was obtained.      SURGEON:   Roberto Recinos MD    REASON FOR PROCEDURE:    The patient complains of pain that seems to have a significant axial component, Increased back pain on range of motion exams, Pain on extension of the lumbar spine, and Positive lumbar facet loading maneuver    SEDATION:  Patient declined administration of moderate sedation    ANESTHETIC:  Marcaine 0.25%  STEROID:  Methylprednisolone (DEPO MEDROL) 40mg/ml  TOTAL VOLUME OF SOLUTION:  6ml    DESCRIPTON OF PROCEDURE:  After obtaining informed consent, IV access was not obtained in the preoperative area.   The patient was taken to the operating room.  The patient was placed in the prone position with a pillow under the abdomen. All pressure points were well padded.  The lumbosacral area was prepped with Chloraprep and draped in a sterile fashion. Under fluoroscopic guidance the transverse processes of the L4 and L5 vertebrae at the junctions of the superior articular processes were identified on the right. Also identified was the groove between the ala and the superior articular process of the sacrum on the ipsilateral side.  Skin and subcutaneous tissue were anesthetized with 1% lidocaine above each of these points. A 25-gauge spinal needle was introduced  under fluoroscopic guidance at the above junctions. Aspiration was negative for blood and CSF.  After confirming the position of the needle with fluoroscope in all views, 0.25 mL of Omnipaque was injected, and after seeing the proper spread a total of 1 mL of the anesthetic solution noted above was injected at each of these points.  Needles were removed intact from each of the areas.  A similar procedure was repeated to block the L3, L4, and L5 nerves on the contralateral side.   Onset of analgesia was noted.  Vital signs remained stable throughout.      ESTIMATED BLOOD LOSS:  <5 mL  SPECIMENS:  none    COMPLICATIONS:   No complications were noted., There was no indication of vascular uptake on live injection of contrast dye., There was no indication of intrathecal uptake on live injection of contrast dye., and There was not any evidence of dural puncture.      TOLERANCE & DISCHARGE CONDITION:    The patient tolerated the procedure well.  The patient was transported to the recovery area without difficulties.  The patient was discharged to home under the care of family in stable and satisfactory condition.    PLAN OF CARE:  The patient was given our standard instruction sheet.  We discussed that Lumbar Medial Branch Blockade is a diagnostic procedure in consideration for radiofrequency ablation if two diagnostic procedures prove to be positive for significant benefit.  If sustained relief of 6 to eight weeks is obtained, then an alternative plan could be therapeutic lumbar branch blockades.  The patient is asked to keep a pain log each hour for 8 hours after the procedure today.  The patient will  Plan for Radiofrequency procedure..  The patient will resume all medications as per the medication reconciliation sheet.      Procedures:  7/6/2023: Bilateral L4-5, L5-S1 LMBB diagnostic: 90% benefit for 3 days  11/9/2023: Bilateral L4-5, L5-S1 LMBB diagnostic: 100% benefit post procedure

## 2023-11-10 ENCOUNTER — TELEPHONE (OUTPATIENT)
Dept: PAIN MEDICINE | Facility: CLINIC | Age: 68
End: 2023-11-10
Payer: MEDICARE

## 2023-11-13 NOTE — TELEPHONE ENCOUNTER
CALLED PATIENT TO SCHEDULE HER AND SHE SAID THAT AFTER SHE GOT SHOT HER LEFT LEG IN THE HIP AREA HURT AND THAT SHE HAD TO LIFT IT UP WITH HER HAND IN ORDER TO GET INTO THE CAR AND IT LASTED UNTIL FRIDAY AND WAS NOT HURTING PRIOR TO INJECTION WANTS TO KNOW IF THIS IS NORMAL BECAUSE NOW SHE IS WORRIED ABOUT GETTING ABLATION.

## 2023-11-13 NOTE — TELEPHONE ENCOUNTER
That is not a common occurrence for this procedure.  It would be very uncommon to happen after an RFA.  We do testing to ensure that we aren't close to any spinal nerves prior to doing the ablation.

## 2023-11-21 DIAGNOSIS — I10 HYPERTENSION, ESSENTIAL: ICD-10-CM

## 2023-11-21 DIAGNOSIS — E11.65 TYPE 2 DIABETES MELLITUS WITH HYPERGLYCEMIA, WITHOUT LONG-TERM CURRENT USE OF INSULIN: ICD-10-CM

## 2023-11-21 DIAGNOSIS — E11.40 TYPE 2 DIABETES MELLITUS WITH DIABETIC NEUROPATHY, WITHOUT LONG-TERM CURRENT USE OF INSULIN: ICD-10-CM

## 2023-11-21 RX ORDER — LOSARTAN POTASSIUM 100 MG/1
100 TABLET ORAL DAILY
Qty: 90 TABLET | Refills: 3 | Status: SHIPPED | OUTPATIENT
Start: 2023-11-21

## 2023-11-21 RX ORDER — GLIMEPIRIDE 4 MG/1
4 TABLET ORAL 2 TIMES DAILY
Qty: 180 TABLET | Refills: 3 | Status: SHIPPED | OUTPATIENT
Start: 2023-11-21

## 2023-11-27 DIAGNOSIS — M54.50 ACUTE MIDLINE LOW BACK PAIN WITHOUT SCIATICA: ICD-10-CM

## 2023-11-27 DIAGNOSIS — M51.36 DDD (DEGENERATIVE DISC DISEASE), LUMBAR: ICD-10-CM

## 2023-11-27 RX ORDER — HYDROCODONE BITARTRATE AND ACETAMINOPHEN 7.5; 325 MG/1; MG/1
1 TABLET ORAL EVERY 8 HOURS PRN
Qty: 90 TABLET | Refills: 0 | Status: SHIPPED | OUTPATIENT
Start: 2023-11-27

## 2023-12-01 ENCOUNTER — TELEPHONE (OUTPATIENT)
Dept: PAIN MEDICINE | Facility: HOSPITAL | Age: 68
End: 2023-12-01
Payer: MEDICARE

## 2023-12-01 NOTE — TELEPHONE ENCOUNTER
Pre procedure call attempted. Patients mailbox is full. Cannot leave a message with pre op information.

## 2023-12-04 ENCOUNTER — HOSPITAL ENCOUNTER (OUTPATIENT)
Dept: PAIN MEDICINE | Facility: HOSPITAL | Age: 68
Discharge: HOME | End: 2023-12-04
Payer: MEDICARE

## 2023-12-04 VITALS
RESPIRATION RATE: 23 BRPM | WEIGHT: 210 LBS | HEIGHT: 62 IN | SYSTOLIC BLOOD PRESSURE: 119 MMHG | TEMPERATURE: 97.3 F | BODY MASS INDEX: 38.64 KG/M2 | DIASTOLIC BLOOD PRESSURE: 66 MMHG | OXYGEN SATURATION: 95 % | HEART RATE: 90 BPM

## 2023-12-04 DIAGNOSIS — R52 PAIN: ICD-10-CM

## 2023-12-04 DIAGNOSIS — M47.816 SPONDYLOSIS OF LUMBAR REGION WITHOUT MYELOPATHY OR RADICULOPATHY: Primary | ICD-10-CM

## 2023-12-04 PROCEDURE — 64635 DESTROY LUMB/SAC FACET JNT: CPT | Performed by: STUDENT IN AN ORGANIZED HEALTH CARE EDUCATION/TRAINING PROGRAM

## 2023-12-04 PROCEDURE — 25010000002 METHYLPREDNISOLONE PER 40 MG: Performed by: STUDENT IN AN ORGANIZED HEALTH CARE EDUCATION/TRAINING PROGRAM

## 2023-12-04 PROCEDURE — 99152 MOD SED SAME PHYS/QHP 5/>YRS: CPT | Performed by: STUDENT IN AN ORGANIZED HEALTH CARE EDUCATION/TRAINING PROGRAM

## 2023-12-04 PROCEDURE — 77003 FLUOROGUIDE FOR SPINE INJECT: CPT

## 2023-12-04 PROCEDURE — 25010000002 MIDAZOLAM PER 1 MG

## 2023-12-04 PROCEDURE — 25010000002 FENTANYL CITRATE (PF) 50 MCG/ML SOLUTION

## 2023-12-04 PROCEDURE — 64636 DESTROY L/S FACET JNT ADDL: CPT | Performed by: STUDENT IN AN ORGANIZED HEALTH CARE EDUCATION/TRAINING PROGRAM

## 2023-12-04 RX ORDER — LIDOCAINE HYDROCHLORIDE 20 MG/ML
5 INJECTION, SOLUTION EPIDURAL; INFILTRATION; INTRACAUDAL; PERINEURAL ONCE
Status: COMPLETED | OUTPATIENT
Start: 2023-12-04 | End: 2023-12-04

## 2023-12-04 RX ORDER — MIDAZOLAM HYDROCHLORIDE 1 MG/ML
2 INJECTION INTRAMUSCULAR; INTRAVENOUS ONCE
Status: COMPLETED | OUTPATIENT
Start: 2023-12-04 | End: 2023-12-04

## 2023-12-04 RX ORDER — FENTANYL CITRATE 50 UG/ML
INJECTION, SOLUTION INTRAMUSCULAR; INTRAVENOUS
Status: COMPLETED
Start: 2023-12-04 | End: 2023-12-04

## 2023-12-04 RX ORDER — FENTANYL CITRATE 50 UG/ML
100 INJECTION, SOLUTION INTRAMUSCULAR; INTRAVENOUS
Status: DISCONTINUED | OUTPATIENT
Start: 2023-12-04 | End: 2023-12-05 | Stop reason: HOSPADM

## 2023-12-04 RX ORDER — MIDAZOLAM HYDROCHLORIDE 1 MG/ML
INJECTION INTRAMUSCULAR; INTRAVENOUS
Status: COMPLETED
Start: 2023-12-04 | End: 2023-12-04

## 2023-12-04 RX ORDER — METHYLPREDNISOLONE ACETATE 40 MG/ML
40 INJECTION, SUSPENSION INTRA-ARTICULAR; INTRALESIONAL; INTRAMUSCULAR; SOFT TISSUE ONCE
Status: COMPLETED | OUTPATIENT
Start: 2023-12-04 | End: 2023-12-04

## 2023-12-04 RX ADMIN — LIDOCAINE HYDROCHLORIDE 5 ML: 20 INJECTION, SOLUTION EPIDURAL; INFILTRATION; INTRACAUDAL; PERINEURAL at 13:28

## 2023-12-04 RX ADMIN — FENTANYL CITRATE 100 MCG: 50 INJECTION, SOLUTION INTRAMUSCULAR; INTRAVENOUS at 13:18

## 2023-12-04 RX ADMIN — MIDAZOLAM HYDROCHLORIDE 2 MG: 1 INJECTION INTRAMUSCULAR; INTRAVENOUS at 13:18

## 2023-12-04 RX ADMIN — MIDAZOLAM 2 MG: 1 INJECTION INTRAMUSCULAR; INTRAVENOUS at 13:18

## 2023-12-04 RX ADMIN — METHYLPREDNISOLONE ACETATE 40 MG: 40 INJECTION, SUSPENSION INTRA-ARTICULAR; INTRALESIONAL; INTRAMUSCULAR; INTRASYNOVIAL; SOFT TISSUE at 13:38

## 2023-12-04 NOTE — DISCHARGE INSTRUCTIONS
Radiofrequency Lesioning, Care After    Refer to this sheet in the next few weeks. These instructions provide you with information about caring for yourself after your procedure. Your health care provider may also give you more specific instructions. Your treatment has been planned according to current medical practices, but problems sometimes occur. Call your health care provider if you have any problems or questions after your procedure.  What can I expect after the procedure?  After the procedure, it is common to have:  Pain from the burned nerve.  You may feel a burning sensation for up to 1-2 weeks after the procedure  Temporary numbness at the site  Your leg/legs may be weak or feel numb after the procedure until the numbing medication wears off.  When you are up and walking, have assistance to prevent falling.     Follow these instructions at home:  Take over-the-counter and prescription medicines only as told by your health care provider.  Return to your normal activities as told by your health care provider. Ask your health care provider what activities are safe for you.  Pay close attention to how you feel after the procedure. If you start to have pain, write down when it hurts and how it feels. This will help you and your health care provider to know if you need an additional treatment.  Check your needle insertion site every day for signs of infection. Watch for:  Redness, swelling, or pain.  Fluid, blood, or pus.  Keep all follow-up visits as told by your health care provider. This is important.  No driving for 24 hrs-make sure you have full sensation in your legs prior to driving.  Avoid using heat on the injection site for 24 hours. You may use ice intermittently if needed by placing a               towel between your skin and the ice bag and using the ice for 20 minutes 2-3 times a day.  Do not take baths, swim or use a hot tub for 24 hours.  Leave your band-aids on for 24 hours and keep them  dry.    Contact a health care provider if:  Your pain does not get better.  You have redness, swelling, or pain at the needle insertion site.  You have fluid, blood, or pus coming from the needle insertion site.  You have a fever over 101 degrees.  You have new numb.ness in your arm or leg after the procedure    Get help right away if:  You develop sudden, severe pain.  You develop numbness or tingling near the procedure site that does not go away.  This information is not intended to replace advice given to you by your health care provider. Make sure you discuss any questions you have with your health care provider.  Document Released: 08/16/2012 Document Revised: 05/25/2017 Document Reviewed: 01/25/2016  [employer] Patient Education © 2019 [employer]  Moderate Conscious Sedation, Adult, Care After    This sheet gives you information about how to care for yourself after your procedure. Your health care provider may also give you more specific instructions. If you have problems or questions, contact your health care provider.    What can I expect after the procedure?  After the procedure, it is common to have:  Sleepiness for several hours.  Impaired judgment for several hours.  Difficulty with balance.  Vomiting if you eat too soon.    Follow these instructions at home:  For the next 24 hours:         Rest.  Do not participate in activities where you could fall or become injured.  Do not drive or use machinery.  Do not drink alcohol.  Do not take sleeping pills or medicines that cause drowsiness.  Do not make important decisions or sign legal documents.  Do not take care of children on your own.    Eating and drinking    Follow the diet recommended by your health care provider.  Drink enough fluid to keep your urine pale yellow.  If you vomit:  Drink water, juice, or soup when you can drink without vomiting.  Make sure you have little or no nausea before eating solid foods.     General  instructions  Take over-the-counter and prescription medicines only as told by your health care provider.  Have a responsible adult stay with you for 24 hours. It is important to have someone help care for you until you are awake and alert.  Do not smoke.  Keep all follow-up visits as told by your health care provider. This is important.    Contact a health care provider if:  You are still sleepy or having trouble with balance after 24 hours.  You feel light-headed.  You keep feeling nauseous or you keep vomiting.  You develop a rash.  You have a fever.  You have redness or swelling around the IV site.    Get help right away if:  You have trouble breathing.  You have new-onset confusion at home.    Summary  After the procedure, it is common to feel sleepy, have impaired judgment, or feel nauseous if you eat too soon.  Rest after you get home. Know the things you should not do after the procedure.  Follow the diet recommended by your health care provider and drink enough fluid to keep your urine pale yellow.  Get help right away if you have trouble breathing or new-onset confusion at home.    This information is not intended to replace advice given to you by your health care provider. Make sure you discuss any questions you have with your health care provider.    Document Revised: 04/16/2021 Document Reviewed: 11/12/2020  [employer] Patient Education © 2021 [employer]

## 2023-12-04 NOTE — PROCEDURES
Bilateral L3-5 Lumbar Medial Branch RADIOFREQUENCY  [facility]      PREOPERATIVE DIAGNOSIS:  Lumbar spondylosis without myelopathy    POSTOPERATIVE DIAGNOSIS:  Lumbar spondylosis without myelopathy    PROCEDURE:   Diagnostic Bilateral Lumbar Medial Branch Nerve thermal radiofrequency lesioning, with fluoroscopy:  L3, L4, and L5 nerves (at the L4 and L5 transverse processes and the sacral alar groove) to thermally treat the innervation to facet joints L4-5 and L5-S1  81088-29 -- Bilateral L/S facet neuro destr., 1st Level  [id number] -- Bilateral L/S facet neuro destr., 2nd  Level    PRE-PROCEDURE DISCUSSION WITH PATIENT:    Risks and complications were discussed with the patient prior to starting the procedure and informed consent was obtained.      SURGEON:  Ignacio Recinos MD    REASON FOR PROCEDURE:    The patient complains of pain that seems to have a significant axial component, Previous diagnostic positivity of two Lumbar Medial Branch Blockades at the same levels, The patient admits to 80% or more pain relief diagnostically from medial branch blockades., and Increased back pain on range of motion exams    SEDATION:  Versed 2mg & Fentanyl 100 mcg IV  TIME OF PROCEDURE:   The intraoperative procedure time after administration of the sedative was 22 minutes.     TO: 1318  Proc End: 1340    ANESTHETIC:  Lidocaine 2%  STEROID:  Methylprednisolone (DEPO MEDROL) 40mg/ml      DESCRIPTON OF PROCEDURE:  After obtaining informed consent, IV access  was obtained in the preoperative area.   The patient was taken to the operating room.  The patient was placed in the prone position with a pillow under the abdomen. All pressure points were well padded.  EKG, blood pressure, and pulse oximeter were monitored.  The patient was monitored and sedated by the RN under my direction. The lumbosacral area was prepped with Chloraprep and draped in a sterile fashion.     Under fluoroscopic guidance the transverse processes of the  L4 and L5 vertebrae at the junctions of the superior articular processes were identified on the right.  Also identified was the groove between the ala and the superior articular process of the sacrum on the ipsilateral side.  Skin and subcutaneous tissue were anesthetized with 1ml of 1% lidocaine above each of these points. Then, radiofrequency probe needles were advanced in this fluoro view to the above junctions.  Aspiration was negative for blood and CSF.  After confirming the position of the needle with fluoroscope in all views, testing was initiated.  Motor testing was confirmed to be negative at 3V and 2Hz for any radicular stimulation.  Then 1mL of the local anesthetic was instilled in each needle.  Two minutes elapsed, and during this time a lateral fluoroscopic view was confirmed again to ensure the needles had not advanced nor retracted.  Then, Radiofrequency Lesioning was initiated for 2.5 minutes at 80 degrees Celsius, rotating the needle tips 90 degrees 1/2 way through.  Needles were removed intact from each of the areas.     A similar procedure was repeated to address the L3, L4, and L5 nerves on the contralateral side.   Onset of analgesia was noted.  Vital signs remained stable throughout.      ESTIMATED BLOOD LOSS:  <5 mL  SPECIMENS:  none    COMPLICATIONS:   No complications were noted.    TOLERANCE & DISCHARGE CONDITION:    The patient tolerated the procedure well.  The patient was transported to the recovery area without difficulties.  The patient was discharged to home under the care of family in stable and satisfactory condition.    PLAN OF CARE:  The patient was given our standard instruction sheet.  The patient will  Return to clinic in 8 wks.  The patient will resume all medications as per the medication reconciliation sheet.

## 2023-12-05 ENCOUNTER — TELEPHONE (OUTPATIENT)
Dept: PAIN MEDICINE | Facility: HOSPITAL | Age: 68
End: 2023-12-05
Payer: MEDICARE

## 2023-12-05 NOTE — TELEPHONE ENCOUNTER
Post procedure phone call completed.  Pt states they are doing good and denies questions or concerns. Pt states pain level #1 today.

## 2023-12-29 DIAGNOSIS — M54.50 ACUTE MIDLINE LOW BACK PAIN WITHOUT SCIATICA: ICD-10-CM

## 2023-12-29 DIAGNOSIS — M51.36 DDD (DEGENERATIVE DISC DISEASE), LUMBAR: ICD-10-CM

## 2023-12-29 RX ORDER — HYDROCODONE BITARTRATE AND ACETAMINOPHEN 7.5; 325 MG/1; MG/1
1 TABLET ORAL EVERY 8 HOURS PRN
Qty: 90 TABLET | Refills: 0 | Status: SHIPPED | OUTPATIENT
Start: 2023-12-29

## 2024-01-19 DIAGNOSIS — E11.40 TYPE 2 DIABETES MELLITUS WITH DIABETIC NEUROPATHY, WITHOUT LONG-TERM CURRENT USE OF INSULIN: ICD-10-CM

## 2024-01-19 DIAGNOSIS — M51.36 DDD (DEGENERATIVE DISC DISEASE), LUMBAR: ICD-10-CM

## 2024-01-19 DIAGNOSIS — R79.89 ELEVATED TSH: ICD-10-CM

## 2024-01-19 RX ORDER — GABAPENTIN 300 MG/1
600 CAPSULE ORAL NIGHTLY
Qty: 180 CAPSULE | Refills: 1 | Status: SHIPPED | OUTPATIENT
Start: 2024-01-19

## 2024-01-19 RX ORDER — LEVOTHYROXINE SODIUM 0.03 MG/1
25 TABLET ORAL DAILY
Qty: 90 TABLET | Refills: 1 | Status: SHIPPED | OUTPATIENT
Start: 2024-01-19

## 2024-02-06 NOTE — PROGRESS NOTES
Med Rec complete per PT  Allergies Reviewed    Pt reports NOT taking anticoagulant in the last 14 days       The ABCs of the Annual Wellness Visit  Subsequent Medicare Wellness Visit    Subjective    Suellen Rubin is a 68 y.o. female who presents for a Subsequent Medicare Wellness Visit.    The following portions of the patient's history were reviewed and   updated as appropriate: allergies, current medications, past family history, past medical history, past social history, past surgical history, and problem list.    Compared to one year ago, the patient feels her physical   health is the same.    Compared to one year ago, the patient feels her mental   health is the same.    Recent Hospitalizations:  She was not admitted to the hospital during the last year.       Current Medical Providers:  Patient Care Team:  Julissa Butler MD as PCP - General (Family Medicine)    Outpatient Medications Prior to Visit   Medication Sig Dispense Refill    atorvastatin (LIPITOR) 20 MG tablet       cyanocobalamin ([facility] Vitamin B-12) 1000 MCG tablet Take 1 tablet by mouth Daily. 90 tablet 1    gabapentin (NEURONTIN) 300 MG capsule Take 2 capsules by mouth Every Night. 180 capsule 1    glimepiride (AMARYL) 4 MG tablet Take 1 tablet by mouth 2 (Two) Times a Day. 180 tablet 3    [START ON 11/3/2023] HYDROcodone-acetaminophen (NORCO) 7.5-325 MG per tablet Take 1 tablet by mouth Every 8 (Eight) Hours As Needed for Moderate Pain. 90 tablet 0    hydrOXYzine (ATARAX) 25 MG tablet Take 1 tablet by mouth 3 (Three) Times a Day As Needed for Itching. 90 tablet 0    Januvia 100 MG tablet TAKE 1 TABLET BY MOUTH EVERY DAY 90 tablet 3    levothyroxine (SYNTHROID, LEVOTHROID) 25 MCG tablet Take 1 tablet by mouth Daily. 90 tablet 1    loperamide (IMODIUM) 2 MG capsule Take 1 capsule by mouth 4 (Four) Times a Day As Needed for Diarrhea.      losartan (COZAAR) 100 MG tablet Take 1 tablet by mouth Daily for 90 days. 90 tablet 0    metFORMIN (GLUCOPHAGE) 500 MG tablet TAKE 2 TABLETS BY MOUTH 2 TIMES A DAY WITH MEALS. 120 tablet 12    nystatin  (MYCOSTATIN) 973951 UNIT/GM powder Apply  topically to the appropriate area as directed 3 (Three) Times a Day. 60 g 3    ondansetron (ZOFRAN) 4 MG tablet TAKE 1 TABLET BY MOUTH EVERY 8 HOURS AS NEEDED FOR NAUSEA OR VOMITING. 20 tablet 1    Accu-Chek Guide test strip USE AS INSTRUCTED (Patient not taking: Reported on 10/25/2023) 50 each 12    baclofen (LIORESAL) 10 MG tablet Take 1 tablet by mouth 3 (Three) Times a Day. (Patient not taking: Reported on 10/25/2023) 90 tablet 0    [START ON 12/2/2023] HYDROcodone-acetaminophen (NORCO) 7.5-325 MG per tablet Take 1 tablet by mouth Every 8 (Eight) Hours As Needed for Moderate Pain. 90 tablet 0     No facility-administered medications prior to visit.       Opioid medication/s are on active medication list.  and I have evaluated her active treatment plan and pain score trends (see table).  Vitals:    10/25/23 1323   PainSc: 0-No pain     I have reviewed the chart for potential of high risk medication and harmful drug interactions in the elderly.          Aspirin is not on active medication list.  Aspirin use is not indicated based on review of current medical condition/s. Risk of harm outweighs potential benefits.  .    Patient Active Problem List   Diagnosis    Encounter to establish care    Type 2 diabetes mellitus with diabetic neuropathy    Hypertension, essential    Mixed hyperlipidemia    Parotid mass    Class 2 severe obesity due to excess calories with serious comorbidity and body mass index (BMI) of 38.0 to 38.9 in adult    Elevated LFTs    Shingles    Nail breaking    Pruritic condition    Former smoker    DDD (degenerative disc disease), lumbar    Acquired hypothyroidism    Mid back pain, chronic    Medicare annual wellness visit, subsequent     Advance Care Planning   Advance Care Planning     Advance Directive is not on file.  ACP discussion was held with the patient during this visit. Patient does not have an advance directive, information provided.    "  Objective    Vitals:    10/25/23 1323   BP: 120/70   BP Location: Left arm   Patient Position: Sitting   Cuff Size: Large Adult   Pulse: 80   Resp: 18   Temp: 97.8 °F (36.6 °C)   TempSrc: Temporal   SpO2: 92%   Weight: 95.3 kg (210 lb)   Height: 157.5 cm (62\")   PainSc: 0-No pain     Estimated body mass index is 38.41 kg/m² as calculated from the following:    Height as of this encounter: 157.5 cm (62\").    Weight as of this encounter: 95.3 kg (210 lb).         ATTENTION  What is the year: correct  What is the month of the year: correct  What is the day of the week?: correct  What is the date?: correct  MEMORY  Repeat address three times, only score third attempt: [address] [place], [place]: 6  HOW MANY ANIMALS DID THE PATIENT NAME  Verbal Fluency -- Animal Names (0-25): 14-16  CLOCK DRAWING  Clock Drawing: All Correct  MEMORY RECALL  Tell me what you remember about that name and address we were repeating at the beginnin  ACE TOTAL SCORE  Total ACE Score - <25/30 strongly suggests cognitive impairment; <21/30 almost certainly shows dementia: 22    Does the patient have evidence of cognitive impairment? No    Lab Results   Component Value Date    CHLPL 120 10/23/2023    TRIG 172 (H) 10/23/2023    HDL 27 (L) 10/23/2023    LDL 64 10/23/2023    VLDL 29 10/23/2023    HGBA1C 7.9 (H) 10/23/2023        HEALTH RISK ASSESSMENT    Smoking Status:  Social History     Tobacco Use   Smoking Status Former    Packs/day: 2.00    Years: 34.00    Additional pack years: 0.00    Total pack years: 68.00    Types: Cigarettes    Start date:     Quit date: 2017    Years since quittin.8   Smokeless Tobacco Never     Alcohol Consumption:  Social History     Substance and Sexual Activity   Alcohol Use Not Currently     Fall Risk Screen:    STEVANE Fall Risk Assessment was completed, and patient is at LOW risk for falls.Assessment completed on:10/25/2023    Depression Screening:      10/25/2023     1:29 PM "   PHQ-2/PHQ-9 Depression Screening   Little Interest or Pleasure in Doing Things 0-->not at all   Feeling Down, Depressed or Hopeless 0-->not at all   PHQ-9: Brief Depression Severity Measure Score 0       Health Habits and Functional and Cognitive Screening:      10/25/2023     1:00 PM   Functional & Cognitive Status   Do you have difficulty preparing food and eating? No   Do you have difficulty bathing yourself, getting dressed or grooming yourself? No   Do you have difficulty using the toilet? No   Do you have difficulty moving around from place to place? No   Do you have trouble with steps or getting out of a bed or a chair? No   Current Diet Well Balanced Diet   Dental Exam Not up to date   Eye Exam Not up to date   Exercise (times per week) 0 times per week   Current Exercises Include No Regular Exercise   Do you need help using the phone?  No   Are you deaf or do you have serious difficulty hearing?  No   Do you need help to go to places out of walking distance? No   Do you need help shopping? No   Do you need help preparing meals?  No   Do you need help with housework?  No   Do you need help with laundry? No   Do you need help taking your medications? No   Do you need help managing money? No   Do you ever drive or ride in a car without wearing a seat belt? No   Have you felt unusual stress, anger or loneliness in the last month? No   Who do you live with? Alone   If you need help, do you have trouble finding someone available to you? No   Have you been bothered in the last four weeks by sexual problems? No   Do you have difficulty concentrating, remembering or making decisions? No       Age-appropriate Screening Schedule:  Refer to the list below for future screening recommendations based on patient's age, sex and/or medical conditions. Orders for these recommended tests are listed in the plan section. The patient has been provided with a written plan.    Health Maintenance   Topic Date Due    COVID-19  Vaccine (1) Never done    Pneumococcal Vaccine 65+ (1 - PCV) Never done    TDAP/TD VACCINES (1 - Tdap) Never done    ZOSTER VACCINE (1 of 2) Never done    DXA SCAN  03/22/2020    MAMMOGRAM  03/23/2020    DIABETIC EYE EXAM  11/20/2021    INFLUENZA VACCINE  Never done    URINE MICROALBUMIN  12/06/2023    HEMOGLOBIN A1C  04/23/2024    DIABETIC FOOT EXAM  06/22/2024    LUNG CANCER SCREENING  07/06/2024    LIPID PANEL  10/23/2024    ANNUAL WELLNESS VISIT  10/25/2024    BMI FOLLOWUP  10/25/2024    COLORECTAL CANCER SCREENING  01/24/2027    HEPATITIS C SCREENING  Completed                  CMS Preventative Services Quick Reference  Risk Factors Identified During Encounter  Immunizations Discussed/Encouraged: Tdap, Influenza, Prevnar 20 (Pneumococcal 20-valent conjugate), Shingrix, and COVID19    The above risks/problems have been discussed with the patient.  Pertinent information has been shared with the patient in the After Visit Summary.  An After Visit Summary and PPPS were made available to the patient.    Follow Up:   Next Medicare Wellness visit to be scheduled in 1 year.       Additional E&M Note during same encounter follows:  Patient has multiple medical problems which are significant and separately identifiable that require additional work above and beyond the Medicare Wellness Visit.      Chief Complaint  Medicare Wellness-subsequent and Diabetes    Subjective        HPI  Suellen Rubin is also being seen today for diabetes    Diabetes  Patient does not check blood sugar at home.  Associated symptoms include Neuropathy  Per patient current diet is Well Balanced.  Patient does not avoid concentrated sweets.  Patient does not see podiatry.  Patient see's [facility] for diabetic eye exam and last eye exam was 2021.  Patient reports they are taking medications as prescribed and they are not having side effects.  Side effects include none   No recent med changes    Influenza immunization was not given  "due to patient refusal.          Objective   Vital Signs:  /70 (BP Location: Left arm, Patient Position: Sitting, Cuff Size: Large Adult)   Pulse 80   Temp 97.8 °F (36.6 °C) (Temporal)   Resp 18   Ht 157.5 cm (62\")   Wt 95.3 kg (210 lb)   SpO2 92%   BMI 38.41 kg/m²     Physical Exam  Vitals and nursing note reviewed.   Constitutional:       General: She is not in acute distress.     Appearance: Normal appearance. She is well-developed. She is obese.   HENT:      Head: Normocephalic and atraumatic.   Cardiovascular:      Rate and Rhythm: Normal rate and regular rhythm.      Heart sounds: No murmur heard.  Pulmonary:      Effort: Pulmonary effort is normal.      Breath sounds: Normal breath sounds. No wheezing.   Musculoskeletal:         General: Normal range of motion.   Skin:     General: Skin is warm and dry.      Findings: No rash.   Neurological:      Mental Status: She is alert and oriented to person, place, and time.   Psychiatric:         Mood and Affect: Mood normal.         Behavior: Behavior normal.         Thought Content: Thought content normal.         Judgment: Judgment normal.            Common labs          3/13/2023    13:39 6/21/2023    13:29 6/21/2023    14:26 10/23/2023    10:56   Common Labs   Glucose   214  186    BUN   12  12    Creatinine   1.03  0.91    Sodium   137  138    Potassium   4.6  4.9    Chloride   100  100    Calcium   9.3  9.6    Total Protein   8.2  8.0    Albumin   4.2  4.0    Total Bilirubin   0.3  0.4    Alkaline Phosphatase   147  127    AST (SGOT)   53  48    ALT (SGPT)   38  37    WBC    6.8    Hemoglobin    14.5    Hematocrit    44.0    Platelets    217    Total Cholesterol    120    Triglycerides    172    HDL Cholesterol    27    LDL Cholesterol     64    Hemoglobin A1C 8.3  8.4   7.9                 Assessment and Plan   Diagnoses and all orders for this visit:    1. Medicare annual wellness visit, subsequent (Primary)    2. Type 2 diabetes mellitus with " diabetic neuropathy, without long-term current use of insulin  -     Hemoglobin A1c; Future  -     Comprehensive Metabolic Panel; Future    3. Class 2 severe obesity due to excess calories with serious comorbidity and body mass index (BMI) of 38.0 to 38.9 in adult  Assessment & Plan:  Patient's (Body mass index is 38.41 kg/m².) indicates that they are morbidly/severely obese (BMI > 40 or > 35 with obesity - related health condition) with health conditions that include hypertension, diabetes mellitus, and dyslipidemias . Weight is unchanged. BMI  is above average; BMI management plan is completed. We discussed portion control and increasing exercise.       4. Former smoker    5. Menopause  -     DEXA Bone Density Axial; Future    6. Encounter for screening mammogram for breast cancer  -     Mammo Screening Digital Tomosynthesis Bilateral With CAD; Future    7. Hypertension, essential    8. Mixed hyperlipidemia    9. Elevated LFTs    10. Acquired hypothyroidism    The patient was counseled regarding nutrition, physical activity, healthy weight, injury prevention, immunizations and preventative health screenings.  Agrees to mammogram and DEXA scan.  Declines flu shot today, advised if the symptoms of flu develop, need to contact office within the first 48 hours for testing and possible treatment with Tamiflu.  Also recommended covid, prevnar 20, Tdap and shingrex    Diabetes somewhat improved control with room for continued improvement.  Continue current medication.  Encouraged continued reduction of sugar in diet and increase physical activity and weight reduction.  Hypothyroidism at goal continue current medications  Hyperlipidemia at goal continue current medications  Elevated liver function test stable recommend weight reduction         Follow Up   Return in about 3 months (around 1/25/2024) for Recheck, diabetes, htn, with Labs.  Patient was given instructions and counseling regarding her condition or for health  maintenance advice. Please see specific information pulled into the AVS if appropriate.

## 2024-02-27 DIAGNOSIS — M54.50 ACUTE MIDLINE LOW BACK PAIN WITHOUT SCIATICA: ICD-10-CM

## 2024-02-27 DIAGNOSIS — M51.36 DDD (DEGENERATIVE DISC DISEASE), LUMBAR: ICD-10-CM

## 2024-02-27 RX ORDER — HYDROCODONE BITARTRATE AND ACETAMINOPHEN 7.5; 325 MG/1; MG/1
1 TABLET ORAL EVERY 8 HOURS PRN
Qty: 90 TABLET | Refills: 0 | Status: SHIPPED | OUTPATIENT
Start: 2024-02-27

## 2024-02-28 ENCOUNTER — TELEPHONE (OUTPATIENT)
Dept: FAMILY MEDICINE CLINIC | Facility: CLINIC | Age: 69
End: 2024-02-28
Payer: MEDICARE

## 2024-03-01 ENCOUNTER — TELEPHONE (OUTPATIENT)
Dept: FAMILY MEDICINE CLINIC | Facility: CLINIC | Age: 69
End: 2024-03-01
Payer: MEDICARE

## 2024-03-01 NOTE — TELEPHONE ENCOUNTER
Spoke with patient and informed her of the overdue mammogram and dexa orders in her chart, she voiced understanding and I provided her with the centralized scheduling number.

## 2024-03-04 NOTE — PROGRESS NOTES
Chief Complaint  Diabetes and Hypertension    History of Present Illness  Seullen Rubin presents today for diabetes and hypertension follow up    Diabetes  Patient does not check blood sugar at home.  Associated symptoms include Neuropathy  Per patient current diet is Well Balanced.  Patient does not avoid concentrated sweets.  Patient does not see podiatry.  Patient see's [facility] for diabetic eye exam and last eye exam was 2023.  Patient reports they are taking medications as prescribed and they are not having side effects.    Hypertension  Patient does not check blood pressure at home.   Patient denies  blurred vision, chest pain, dyspnea, headache, neck aches, orthopnea, palpitations, paroxysmal nocturnal dyspnea, peripheral edema, pulsating in the ears, and tiredness/fatigue   Patient reports they are taking medications as prescribed and they are not having side effects.    Have seen Dr Recinos for low back pain injections and pain block did not help. Continuing hydrocodone 3 -4  daily   Influenza immunization was not given due to patient refusal.    Patient Care Team:  Julissa Butler MD as PCP - General (Family Medicine)   Current Outpatient Medications on File Prior to Visit   Medication Sig    Accu-Chek Guide test strip USE AS INSTRUCTED    atorvastatin (LIPITOR) 20 MG tablet     cyanocobalamin ([employer] Vitamin B-12) 1000 MCG tablet Take 1 tablet by mouth Daily.    gabapentin (NEURONTIN) 300 MG capsule Take 2 capsules by mouth Every Night.    glimepiride (AMARYL) 4 MG tablet Take 1 tablet by mouth 2 (Two) Times a Day.    HYDROcodone-acetaminophen (NORCO) 7.5-325 MG per tablet Take 1 tablet by mouth Every 8 (Eight) Hours As Needed for Moderate Pain.    hydrOXYzine (ATARAX) 25 MG tablet Take 1 tablet by mouth 3 (Three) Times a Day As Needed for Itching.    levothyroxine (SYNTHROID, LEVOTHROID) 25 MCG tablet Take 1 tablet by mouth Daily.    loperamide (IMODIUM) 2 MG capsule Take 1 capsule by mouth 4  "(Four) Times a Day As Needed for Diarrhea.    losartan (COZAAR) 100 MG tablet Take 1 tablet by mouth Daily.    metFORMIN (GLUCOPHAGE) 500 MG tablet TAKE 2 TABLETS BY MOUTH 2 TIMES A DAY WITH MEALS.    nystatin (MYCOSTATIN) 710563 UNIT/GM powder Apply  topically to the appropriate area as directed 3 (Three) Times a Day.    SITagliptin (Januvia) 100 MG tablet Take 1 tablet by mouth Daily.    baclofen (LIORESAL) 10 MG tablet Take 1 tablet by mouth 3 (Three) Times a Day. (Patient not taking: Reported on 3/8/2024)    ondansetron (ZOFRAN) 4 MG tablet TAKE 1 TABLET BY MOUTH EVERY 8 HOURS AS NEEDED FOR NAUSEA OR VOMITING. (Patient not taking: Reported on 3/8/2024)     No current facility-administered medications on file prior to visit.       Objective   Vital Signs:   /68 (BP Location: Right arm, Patient Position: Sitting, Cuff Size: Large Adult)   Pulse 65   Temp 98.4 °F (36.9 °C) (Temporal)   Resp 18   Ht 157.5 cm (62\")   Wt 99.3 kg (219 lb)   SpO2 92%   BMI 40.06 kg/m²    BP Readings from Last 3 Encounters:   03/08/24 116/68   12/04/23 119/66   11/09/23 124/84     Wt Readings from Last 3 Encounters:   03/08/24 99.3 kg (219 lb)   12/04/23 95.3 kg (210 lb)   10/25/23 95.3 kg (210 lb)         Physical Exam  Vitals and nursing note reviewed.   Constitutional:       General: She is not in acute distress.     Appearance: Normal appearance. She is well-developed. She is obese.   HENT:      Head: Normocephalic and atraumatic.   Cardiovascular:      Rate and Rhythm: Normal rate and regular rhythm.      Heart sounds: No murmur heard.  Pulmonary:      Effort: Pulmonary effort is normal.      Breath sounds: Normal breath sounds. No wheezing.   Musculoskeletal:         General: Normal range of motion.   Skin:     General: Skin is warm and dry.      Findings: No rash.   Neurological:      Mental Status: She is alert and oriented to person, place, and time.   Psychiatric:         Mood and Affect: Mood normal.         " Behavior: Behavior normal.         Thought Content: Thought content normal.         Judgment: Judgment normal.            No visits with results within 1 Day(s) from this visit.   Latest known visit with results is:   Results Encounter on 01/16/2024   Component Date Value Ref Range Status    Hemoglobin A1C 02/29/2024 9.4 (H)  4.8 - 5.6 % Final    Comment:          Prediabetes: 5.7 - 6.4           Diabetes: >6.4           Glycemic control for adults with diabetes: <7.0      Glucose 02/29/2024 175 (H)  70 - 99 mg/dL Final    BUN 02/29/2024 12  8 - 27 mg/dL Final    Creatinine 02/29/2024 1.01 (H)  0.57 - 1.00 mg/dL Final    EGFR Result 02/29/2024 60  >59 mL/min/1.73 Final    BUN/Creatinine Ratio 02/29/2024 12  12 - 28 Final    Sodium 02/29/2024 140  134 - 144 mmol/L Final    Potassium 02/29/2024 4.9  3.5 - 5.2 mmol/L Final    Chloride 02/29/2024 101  96 - 106 mmol/L Final    Total CO2 02/29/2024 21  20 - 29 mmol/L Final    Calcium 02/29/2024 10.3  8.7 - 10.3 mg/dL Final    Total Protein 02/29/2024 8.1  6.0 - 8.5 g/dL Final    Albumin 02/29/2024 4.3  3.8 - 4.8 g/dL Final    Globulin 02/29/2024 3.8  1.5 - 4.5 g/dL Final    A/G Ratio 02/29/2024 1.1 (L)  1.2 - 2.2 Final    Total Bilirubin 02/29/2024 0.4  0.0 - 1.2 mg/dL Final    Alkaline Phosphatase 02/29/2024 124 (H)  44 - 121 IU/L Final    AST (SGOT) 02/29/2024 50 (H)  0 - 40 IU/L Final    ALT (SGPT) 02/29/2024 33 (H)  0 - 32 IU/L Final     A1C Last 3 Results          6/21/2023    13:29 10/23/2023    10:56 2/29/2024    09:00   HGBA1C Last 3 Results   Hemoglobin A1C 8.4  7.9  9.4      Lab Results   Component Value Date    CHLPL 120 10/23/2023    TRIG 172 (H) 10/23/2023    HDL 27 (L) 10/23/2023    LDL 64 10/23/2023     Lab Results   Component Value Date    TSH 4.180 10/23/2023     Lab Results   Component Value Date    GLUCOSE 175 (H) 02/29/2024    BUN 12 02/29/2024    CREATININE 1.01 (H) 02/29/2024    EGFRIFNONA 72 12/15/2021    EGFRIFAFRI 83 12/15/2021    BCR 12  02/29/2024    K 4.9 02/29/2024    CO2 21 02/29/2024    CALCIUM 10.3 02/29/2024    PROTENTOTREF 8.1 02/29/2024    ALBUMIN 4.3 02/29/2024    LABIL2 1.1 (L) 02/29/2024    AST 50 (H) 02/29/2024    ALT 33 (H) 02/29/2024     Lab Results   Component Value Date    WBC 6.8 10/23/2023    HGB 14.5 10/23/2023    HCT 44.0 10/23/2023    MCV 92 10/23/2023     10/23/2023                      Assessment and Plan    Diagnoses and all orders for this visit:    1. Type 2 diabetes mellitus with hyperglycemia, without long-term current use of insulin (Primary)  -     Continuous Blood Gluc Sensor (Dexcom G7 Sensor) misc; Use 1 Device Every 10 (Ten) Days.  Dispense: 3 each; Refill: 12  -     Continuous Blood Gluc  (Dexcom G7 ) device; Use 1 Device Continuous.  Dispense: 1 each; Refill: 0  -     Hemoglobin A1c; Future    2. Type 2 diabetes mellitus with diabetic neuropathy, without long-term current use of insulin  -     Cancel: POCT microalbumin  -     Continuous Blood Gluc Sensor (Dexcom G7 Sensor) misc; Use 1 Device Every 10 (Ten) Days.  Dispense: 3 each; Refill: 12  -     Continuous Blood Gluc  (Dexcom G7 ) device; Use 1 Device Continuous.  Dispense: 1 each; Refill: 0    3. Hypertension, essential    4. Body mass index (BMI) of 40.0 to 44.9 in adult    5. Former smoker    6. Mixed hyperlipidemia  -     Comprehensive Metabolic Panel; Future    7. Elevated LFTs    8. Acquired hypothyroidism  -     TSH; Future  -     T4, Free; Future    9. B12 deficiency  -     Vitamin B12; Future        Diabetes, poor control with A1c up to 9.4.  Discussed treatment options.  She is currently taking Amaryl 4 mg twice daily, metformin 500 mg 2 tablets twice daily, and Januvia 100 mg daily. GLP 1 was going to cost $400 a month could not afford and did not start.  She does not want to consider alternative therapies at this time current medication.  Stressed need to follow a LCS diet. Need to cut out candy.  Patient is  inquiring about a continuous glucose monitor, agree this may be a helpful tool.  Ordering a continuous glucose monitor to help her better understand correlation between diet and glucose and hopefully adjust eating habits.    Hypertension at goal continue current medications    Vitamin B12 deficiency currently taking 1000 mcg daily, will check level with next labs.    Hypothyroidism, will check thyroid levels with next labs in 3 months.    The patient was counseled regarding nutrition, physical activity, healthy weight, immunizations and preventative health screenings.    Reminded to schedule mammogram, DEXA scan and eye exam.     Pt declines vaccinations    There are no discontinued medications.      Follow Up     Return in about 3 months (around 6/8/2024) for Recheck, diabetes with labs prior.    Patient was given instructions and counseling regarding her condition or for health maintenance advice. Please see specific information pulled into the AVS if appropriate.

## 2024-03-08 ENCOUNTER — OFFICE VISIT (OUTPATIENT)
Dept: FAMILY MEDICINE CLINIC | Facility: CLINIC | Age: 69
End: 2024-03-08
Payer: MEDICARE

## 2024-03-08 VITALS
OXYGEN SATURATION: 92 % | HEART RATE: 65 BPM | WEIGHT: 219 LBS | SYSTOLIC BLOOD PRESSURE: 116 MMHG | DIASTOLIC BLOOD PRESSURE: 68 MMHG | HEIGHT: 62 IN | RESPIRATION RATE: 18 BRPM | TEMPERATURE: 98.4 F | BODY MASS INDEX: 40.3 KG/M2

## 2024-03-08 DIAGNOSIS — I10 HYPERTENSION, ESSENTIAL: ICD-10-CM

## 2024-03-08 DIAGNOSIS — E78.2 MIXED HYPERLIPIDEMIA: ICD-10-CM

## 2024-03-08 DIAGNOSIS — R79.89 ELEVATED LFTS: ICD-10-CM

## 2024-03-08 DIAGNOSIS — E11.40 TYPE 2 DIABETES MELLITUS WITH DIABETIC NEUROPATHY, WITHOUT LONG-TERM CURRENT USE OF INSULIN: ICD-10-CM

## 2024-03-08 DIAGNOSIS — E03.9 ACQUIRED HYPOTHYROIDISM: ICD-10-CM

## 2024-03-08 DIAGNOSIS — Z87.891 FORMER SMOKER: ICD-10-CM

## 2024-03-08 DIAGNOSIS — E53.8 B12 DEFICIENCY: ICD-10-CM

## 2024-03-08 DIAGNOSIS — E11.65 TYPE 2 DIABETES MELLITUS WITH HYPERGLYCEMIA, WITHOUT LONG-TERM CURRENT USE OF INSULIN: Primary | ICD-10-CM

## 2024-03-08 PROCEDURE — 3074F SYST BP LT 130 MM HG: CPT | Performed by: FAMILY MEDICINE

## 2024-03-08 PROCEDURE — 1160F RVW MEDS BY RX/DR IN RCRD: CPT | Performed by: FAMILY MEDICINE

## 2024-03-08 PROCEDURE — 99214 OFFICE O/P EST MOD 30 MIN: CPT | Performed by: FAMILY MEDICINE

## 2024-03-08 PROCEDURE — 3078F DIAST BP <80 MM HG: CPT | Performed by: FAMILY MEDICINE

## 2024-03-08 PROCEDURE — 1159F MED LIST DOCD IN RCRD: CPT | Performed by: FAMILY MEDICINE

## 2024-03-08 PROCEDURE — 3046F HEMOGLOBIN A1C LEVEL >9.0%: CPT | Performed by: FAMILY MEDICINE

## 2024-03-08 RX ORDER — ACYCLOVIR 400 MG/1
1 TABLET ORAL CONTINUOUS
Qty: 1 EACH | Refills: 0 | Status: SHIPPED | OUTPATIENT
Start: 2024-03-08

## 2024-03-08 RX ORDER — ACYCLOVIR 400 MG/1
1 TABLET ORAL
Qty: 3 EACH | Refills: 12 | Status: SHIPPED | OUTPATIENT
Start: 2024-03-08

## 2024-03-13 DIAGNOSIS — E53.8 B12 DEFICIENCY: ICD-10-CM

## 2024-03-13 RX ORDER — LANOLIN ALCOHOL/MO/W.PET/CERES
1000 CREAM (GRAM) TOPICAL DAILY
Qty: 90 TABLET | Refills: 1 | Status: SHIPPED | OUTPATIENT
Start: 2024-03-13

## 2024-03-18 ENCOUNTER — HOSPITAL ENCOUNTER (OUTPATIENT)
Dept: MAMMOGRAPHY | Facility: HOSPITAL | Age: 69
Discharge: HOME | End: 2024-03-18
Payer: MEDICARE

## 2024-03-18 ENCOUNTER — HOSPITAL ENCOUNTER (OUTPATIENT)
Dept: BONE DENSITY | Facility: HOSPITAL | Age: 69
Discharge: HOME | End: 2024-03-18
Payer: MEDICARE

## 2024-03-18 DIAGNOSIS — Z12.31 ENCOUNTER FOR SCREENING MAMMOGRAM FOR BREAST CANCER: ICD-10-CM

## 2024-03-18 DIAGNOSIS — Z78.0 MENOPAUSE: ICD-10-CM

## 2024-03-18 PROCEDURE — 77063 BREAST TOMOSYNTHESIS BI: CPT

## 2024-03-18 PROCEDURE — 77067 SCR MAMMO BI INCL CAD: CPT

## 2024-03-18 PROCEDURE — 77080 DXA BONE DENSITY AXIAL: CPT

## 2024-03-20 ENCOUNTER — TELEPHONE (OUTPATIENT)
Dept: FAMILY MEDICINE CLINIC | Facility: CLINIC | Age: 69
End: 2024-03-20
Payer: MEDICARE

## 2024-03-20 DIAGNOSIS — E11.40 TYPE 2 DIABETES MELLITUS WITH DIABETIC NEUROPATHY, WITHOUT LONG-TERM CURRENT USE OF INSULIN: Primary | ICD-10-CM

## 2024-03-20 RX ORDER — BLOOD-GLUCOSE SENSOR
1 EACH MISCELLANEOUS
Qty: 6 EACH | Refills: 3 | Status: SHIPPED | OUTPATIENT
Start: 2024-03-20

## 2024-03-20 RX ORDER — BLOOD-GLUCOSE,RECEIVER,CONT
1 EACH MISCELLANEOUS CONTINUOUS
Qty: 1 EACH | Refills: 0 | Status: SHIPPED | OUTPATIENT
Start: 2024-03-20

## 2024-03-20 NOTE — TELEPHONE ENCOUNTER
Caller: Suellen Rubin    Relationship: Self    Best call back number: [phone]     What medication are you requesting: FREESTYLE ANISA 3 MONITOR AND SENSORS    What are your current symptoms:     How long have you been experiencing symptoms:     Have you had these symptoms before:    [x] Yes  [] No    Have you been treated for these symptoms before:   [x] Yes  [] No    If a prescription is needed, what is your preferred pharmacy and phone number: [facility] - [place], [place] - [address] [facility] [phone] [facility] [phone]      Additional notes: INSURANCE WILL ONLY COVER THE FREESTYLE ANISA AND NOT THE DEXCOM G7.

## 2024-03-22 ENCOUNTER — OFFICE VISIT (OUTPATIENT)
Dept: PAIN MEDICINE | Facility: CLINIC | Age: 69
End: 2024-03-22
Payer: MEDICARE

## 2024-03-22 VITALS
OXYGEN SATURATION: 94 % | RESPIRATION RATE: 16 BRPM | DIASTOLIC BLOOD PRESSURE: 96 MMHG | SYSTOLIC BLOOD PRESSURE: 158 MMHG | HEART RATE: 78 BPM

## 2024-03-22 DIAGNOSIS — M54.50 ACUTE MIDLINE LOW BACK PAIN WITHOUT SCIATICA: ICD-10-CM

## 2024-03-22 DIAGNOSIS — M51.36 DDD (DEGENERATIVE DISC DISEASE), LUMBAR: ICD-10-CM

## 2024-03-22 DIAGNOSIS — Z79.899 HIGH RISK MEDICATION USE: Primary | ICD-10-CM

## 2024-03-22 PROCEDURE — G0463 HOSPITAL OUTPT CLINIC VISIT: HCPCS | Performed by: STUDENT IN AN ORGANIZED HEALTH CARE EDUCATION/TRAINING PROGRAM

## 2024-03-22 RX ORDER — HYDROCODONE BITARTRATE AND ACETAMINOPHEN 7.5; 325 MG/1; MG/1
1 TABLET ORAL EVERY 8 HOURS PRN
Qty: 90 TABLET | Refills: 0 | Status: SHIPPED | OUTPATIENT
Start: 2024-04-30

## 2024-03-22 RX ORDER — HYDROCODONE BITARTRATE AND ACETAMINOPHEN 7.5; 325 MG/1; MG/1
1 TABLET ORAL EVERY 8 HOURS PRN
Qty: 90 TABLET | Refills: 0 | Status: SHIPPED | OUTPATIENT
Start: 2024-05-29

## 2024-03-22 RX ORDER — HYDROCODONE BITARTRATE AND ACETAMINOPHEN 7.5; 325 MG/1; MG/1
1 TABLET ORAL EVERY 8 HOURS PRN
Qty: 90 TABLET | Refills: 0 | Status: SHIPPED | OUTPATIENT
Start: 2024-04-01

## 2024-03-22 NOTE — PROGRESS NOTES
CHIEF COMPLAINT  Chief Complaint   Patient presents with    Back Pain     LD Hydrocodone @ 0800 3/22/24       Primary Care  Julissa Butler MD    Subjective   Suellen Rubin is a 69 y.o. female  who presents for chronic axial low back pain.  She reports that she has had longstanding low back pain.  She reports that she is previously treated this pain with a combination of NSAIDs as well as narcotics.  She states that she was previously taking NSAIDs however her primary care stopped her NSAIDs and switch from narcotics.  It appears that she has been progressively escalating her narcotic dosage over the past year.  She is currently taking approximately 3 hydrocodone's daily.  She reports primarily axial back pain without any significant radicular pain.  She reports the pain is worse with any type of movement physical activity and improves with rest and medication.  She denies any red flag symptoms such as loss of bowel or bladder or any saddle anesthesia.  She does have plain films which do show facet arthropathy especially in the lower lumbar segments    History of Present Illness     Location: Axial low back  Onset: Years ago  Duration: Slowly worsening  Timing: Constant throughout the day  Quality: Sharp, stabbing  Severity: Today: 6       Last Week: 6       Worst: 7  Modifying Factors: The pain is worse with movement and physical activity and improves with medication rest  Functional Deficit: The pain makes it difficult for her to perform her activities of daily living    Physical Therapy: no    Interval Update 03/22/2024: She is now status post lumbar RFA.  Unsure whether she is getting significant benefit.  Continues with hydrocodone 3 times daily with some benefit.    The following portions of the patient's history were reviewed and updated as appropriate: allergies, current medications, past family history, past medical history, past social history, past surgical history and problem  list.    Procedures:  7/6/2023: Bilateral L4-5, L5-S1 LMBB diagnostic: 90% benefit for 3 days  12/4/2023: Bilateral L4-5, 5 S1 RFA: Unclear benefit      Current Outpatient Medications:     Accu-Chek Guide test strip, USE AS INSTRUCTED, Disp: 50 each, Rfl: 12    atorvastatin (LIPITOR) 20 MG tablet, , Disp: , Rfl:     baclofen (LIORESAL) 10 MG tablet, Take 1 tablet by mouth 3 (Three) Times a Day., Disp: 90 tablet, Rfl: 0    Continuous Blood Gluc  (FreeStyle Dolores 3 Cros[place]) device, Use 1 Device Continuous., Disp: 1 each, Rfl: 0    Continuous Blood Gluc Sensor (FreeStyle Dolores 3 Sensor) misc, Use 1 Piece Every 14 (Fourteen) Days., Disp: 6 each, Rfl: 3    gabapentin (NEURONTIN) 300 MG capsule, Take 2 capsules by mouth Every Night., Disp: 180 capsule, Rfl: 1    glimepiride (AMARYL) 4 MG tablet, Take 1 tablet by mouth 2 (Two) Times a Day., Disp: 180 tablet, Rfl: 3    [START ON 5/29/2024] HYDROcodone-acetaminophen (NORCO) 7.5-325 MG per tablet, Take 1 tablet by mouth Every 8 (Eight) Hours As Needed for Moderate Pain., Disp: 90 tablet, Rfl: 0    hydrOXYzine (ATARAX) 25 MG tablet, Take 1 tablet by mouth 3 (Three) Times a Day As Needed for Itching., Disp: 90 tablet, Rfl: 0    levothyroxine (SYNTHROID, LEVOTHROID) 25 MCG tablet, Take 1 tablet by mouth Daily., Disp: 90 tablet, Rfl: 1    loperamide (IMODIUM) 2 MG capsule, Take 1 capsule by mouth 4 (Four) Times a Day As Needed for Diarrhea., Disp: , Rfl:     losartan (COZAAR) 100 MG tablet, Take 1 tablet by mouth Daily., Disp: 90 tablet, Rfl: 3    metFORMIN (GLUCOPHAGE) 500 MG tablet, TAKE 2 TABLETS BY MOUTH 2 TIMES A DAY WITH MEALS., Disp: 120 tablet, Rfl: 12    nystatin (MYCOSTATIN) 807045 UNIT/GM powder, Apply  topically to the appropriate area as directed 3 (Three) Times a Day., Disp: 60 g, Rfl: 3    ondansetron (ZOFRAN) 4 MG tablet, TAKE 1 TABLET BY MOUTH EVERY 8 HOURS AS NEEDED FOR NAUSEA OR VOMITING., Disp: 20 tablet, Rfl: 1    SITagliptin (Januvia) 100 MG tablet,  Take 1 tablet by mouth Daily., Disp: 90 tablet, Rfl: 3    vitamin B-12 (CYANOCOBALAMIN) 1000 MCG tablet, TAKE 1 TABLET BY MOUTH DAILY, Disp: 90 tablet, Rfl: 1    [START ON 4/30/2024] HYDROcodone-acetaminophen (NORCO) 7.5-325 MG per tablet, Take 1 tablet by mouth Every 8 (Eight) Hours As Needed for Moderate Pain., Disp: 90 tablet, Rfl: 0    [START ON 4/1/2024] HYDROcodone-acetaminophen (NORCO) 7.5-325 MG per tablet, Take 1 tablet by mouth Every 8 (Eight) Hours As Needed for Moderate Pain., Disp: 90 tablet, Rfl: 0    Review of Systems   Musculoskeletal:  Positive for arthralgias, back pain, gait problem and myalgias. Negative for joint swelling, neck pain and neck stiffness.   Neurological:  Negative for weakness and numbness.       Vitals:    03/22/24 1402   BP: 158/96   Pulse: 78   Resp: 16   SpO2: 94%   PainSc:   4       Urine Drug Screen: 3/22/2024  Appropriate: Pending    Objective   Physical Exam  Vitals and nursing note reviewed.   Constitutional:       General: She is not in acute distress.     Appearance: Normal appearance. She is well-developed and normal weight.   Neck:      Trachea: No tracheal deviation.   Musculoskeletal:      Comments: Lumbar Spine Exam:  Tender to palpation over the lumbar paraspinal musculature Yes  Limited range of motion secondary to pain Yes  Facet loading positive: bilateral  Facets tender to palpation: bilateral  Straight leg raise test positive: Negative       Neurological:      General: No focal deficit present.      Mental Status: She is alert.      Sensory: No sensory deficit.      Motor: No weakness.           Assessment & Plan   Problems Addressed this Visit          Other    DDD (degenerative disc disease), lumbar    Relevant Medications    HYDROcodone-acetaminophen (NORCO) 7.5-325 MG per tablet (Start on 5/29/2024)    HYDROcodone-acetaminophen (NORCO) 7.5-325 MG per tablet (Start on 4/30/2024)    HYDROcodone-acetaminophen (NORCO) 7.5-325 MG per tablet (Start on 4/1/2024)      Other Visit Diagnoses       High risk medication use    -  Primary    Relevant Orders    Urine Drug Screen - Urine, Clean Catch    Acute midline low back pain without sciatica        Relevant Medications    HYDROcodone-acetaminophen (NORCO) 7.5-325 MG per tablet (Start on 5/29/2024)          Diagnoses         Codes Comments    High risk medication use    -  Primary ICD-10-CM: Z79.899  ICD-9-CM: V58.69     DDD (degenerative disc disease), lumbar     ICD-10-CM: M51.36  ICD-9-CM: 722.52     Acute midline low back pain without sciatica     ICD-10-CM: M54.50  ICD-9-CM: 724.2             Plan:  Continue with hydrocodone 7.5 mg 3 times daily  Unclear benefit from RFA  Repeat UDS today.  Inspect appropriate  If her pain worsens, we can obtain new imaging  --- Follow-up 3 months           INSPECT REPORT    As part of the patient's treatment plan, I may be prescribing controlled substances. The patient has been made aware of appropriate use of such medications, including potential risk of somnolence, limited ability to drive and/or work safely, and the potential for dependence or overdose. It has also bee made clear that these medications are for use by this patient only, without concomitant use of alcohol or other substances unless prescribed.     Patient has completed prescribing agreement detailing terms of continued prescribing of controlled substances, including monitoring ROBYN reports, urine drug screening, and pill counts if necessary. The patient is aware that inappropriate use will results in cessation of prescribing such medications.    INSPECT report has been reviewed and scanned into the patient's chart.    As the clinician, I personally reviewed the INSPECT from 3/20/2024.    History and physical exam exhibit continued safe and appropriate use of controlled substances.      EMR Dragon/Transcription disclaimer:   Much of this encounter note is an electronic transcription/translation of spoken language to  printed text. The electronic translation of spoken language may permit erroneous, or at times, nonsensical words or phrases to be inadvertently transcribed; Although I have reviewed the note for such errors, some may still exist.

## 2024-03-23 DIAGNOSIS — E11.65 TYPE 2 DIABETES MELLITUS WITH HYPERGLYCEMIA, WITHOUT LONG-TERM CURRENT USE OF INSULIN: ICD-10-CM

## 2024-04-05 ENCOUNTER — TELEPHONE (OUTPATIENT)
Dept: FAMILY MEDICINE CLINIC | Facility: CLINIC | Age: 69
End: 2024-04-05
Payer: MEDICARE

## 2024-04-05 DIAGNOSIS — K59.00 CONSTIPATION, UNSPECIFIED CONSTIPATION TYPE: Primary | ICD-10-CM

## 2024-04-05 RX ORDER — POLYETHYLENE GLYCOL 3350 17 G/17G
17 POWDER, FOR SOLUTION ORAL DAILY
Qty: 578 G | Refills: 3 | Status: SHIPPED | OUTPATIENT
Start: 2024-04-05

## 2024-04-05 NOTE — TELEPHONE ENCOUNTER
Have sent prescription for polyethylene glycol/MiraLAX to [facility] pharmacy as requested

## 2024-04-05 NOTE — TELEPHONE ENCOUNTER
Caller: Suellen Rubin    Relationship: Self    Best call back number: [phone]    What medication are you requesting: POLYETHYLENE GLYCOL 3350    What are your current symptoms: CONSTIPATION        Have you had these symptoms before:    [x] Yes  [] No    Have you been treated for these symptoms before:   [x] Yes  [] No    If a prescription is needed, what is your preferred pharmacy and phone number:  [facility] - [place], [place] - [address] [facility] [phone] [facility] [phone] FX

## 2024-04-15 ENCOUNTER — TELEPHONE (OUTPATIENT)
Dept: FAMILY MEDICINE CLINIC | Facility: CLINIC | Age: 69
End: 2024-04-15
Payer: MEDICARE

## 2024-04-15 NOTE — TELEPHONE ENCOUNTER
Hub staff attempted to follow warm transfer process and was unsuccessful     Caller: Suellen Rubin    Relationship to patient: Self    Best call back number: [phone]     Patient is needing:PATIENT WAS RETURNING A MISSED CALL.  PLEASE CALL AGAIN.

## 2024-05-29 ENCOUNTER — TELEPHONE (OUTPATIENT)
Dept: FAMILY MEDICINE CLINIC | Facility: CLINIC | Age: 69
End: 2024-05-29
Payer: MEDICARE

## 2024-05-29 RX ORDER — ATORVASTATIN CALCIUM 20 MG/1
20 TABLET, FILM COATED ORAL DAILY
Qty: 90 TABLET | Refills: 1 | OUTPATIENT
Start: 2024-05-29

## 2024-05-29 RX ORDER — ATORVASTATIN CALCIUM 20 MG/1
20 TABLET, FILM COATED ORAL DAILY
Qty: 90 TABLET | Refills: 3 | Status: SHIPPED | OUTPATIENT
Start: 2024-05-29

## 2024-05-29 NOTE — TELEPHONE ENCOUNTER
Caller: Suellen Rubin    Relationship: Self    Best call back number: [phone]    What medication are you requesting:   atorvastatin (LIPITOR) 20 MG tablet       Have you had these symptoms before:    [] Yes  [x] No    Have you been treated for these symptoms before:   [] Yes  [x] No    If a prescription is needed, what is your preferred pharmacy and phone number: [facility] - [place], [place] - [address]  - [phone] [facility] [phone] FX     Additional notes:

## 2024-06-21 ENCOUNTER — OFFICE VISIT (OUTPATIENT)
Dept: PAIN MEDICINE | Facility: CLINIC | Age: 69
End: 2024-06-21
Payer: MEDICARE

## 2024-06-21 VITALS
OXYGEN SATURATION: 92 % | DIASTOLIC BLOOD PRESSURE: 74 MMHG | SYSTOLIC BLOOD PRESSURE: 125 MMHG | HEART RATE: 95 BPM | RESPIRATION RATE: 16 BRPM

## 2024-06-21 DIAGNOSIS — M51.36 DDD (DEGENERATIVE DISC DISEASE), LUMBAR: ICD-10-CM

## 2024-06-21 DIAGNOSIS — M54.50 ACUTE MIDLINE LOW BACK PAIN WITHOUT SCIATICA: ICD-10-CM

## 2024-06-21 PROCEDURE — 1125F AMNT PAIN NOTED PAIN PRSNT: CPT | Performed by: STUDENT IN AN ORGANIZED HEALTH CARE EDUCATION/TRAINING PROGRAM

## 2024-06-21 PROCEDURE — 1159F MED LIST DOCD IN RCRD: CPT | Performed by: STUDENT IN AN ORGANIZED HEALTH CARE EDUCATION/TRAINING PROGRAM

## 2024-06-21 PROCEDURE — 3074F SYST BP LT 130 MM HG: CPT | Performed by: STUDENT IN AN ORGANIZED HEALTH CARE EDUCATION/TRAINING PROGRAM

## 2024-06-21 PROCEDURE — 3078F DIAST BP <80 MM HG: CPT | Performed by: STUDENT IN AN ORGANIZED HEALTH CARE EDUCATION/TRAINING PROGRAM

## 2024-06-21 PROCEDURE — 1160F RVW MEDS BY RX/DR IN RCRD: CPT | Performed by: STUDENT IN AN ORGANIZED HEALTH CARE EDUCATION/TRAINING PROGRAM

## 2024-06-21 PROCEDURE — G0463 HOSPITAL OUTPT CLINIC VISIT: HCPCS | Performed by: STUDENT IN AN ORGANIZED HEALTH CARE EDUCATION/TRAINING PROGRAM

## 2024-06-21 PROCEDURE — 99214 OFFICE O/P EST MOD 30 MIN: CPT | Performed by: STUDENT IN AN ORGANIZED HEALTH CARE EDUCATION/TRAINING PROGRAM

## 2024-06-21 RX ORDER — HYDROCODONE BITARTRATE AND ACETAMINOPHEN 7.5; 325 MG/1; MG/1
1 TABLET ORAL EVERY 8 HOURS PRN
Qty: 90 TABLET | Refills: 0 | Status: SHIPPED | OUTPATIENT
Start: 2024-07-26

## 2024-06-21 RX ORDER — HYDROCODONE BITARTRATE AND ACETAMINOPHEN 7.5; 325 MG/1; MG/1
1 TABLET ORAL EVERY 8 HOURS PRN
Qty: 90 TABLET | Refills: 0 | Status: SHIPPED | OUTPATIENT
Start: 2024-08-23

## 2024-06-21 RX ORDER — HYDROCODONE BITARTRATE AND ACETAMINOPHEN 7.5; 325 MG/1; MG/1
1 TABLET ORAL EVERY 8 HOURS PRN
Qty: 90 TABLET | Refills: 0 | Status: SHIPPED | OUTPATIENT
Start: 2024-06-28

## 2024-06-21 NOTE — PROGRESS NOTES
CHIEF COMPLAINT  Chief Complaint   Patient presents with    Pain     LD Hydrocodone @ 1300 6/21/24       Primary Care  Julissa Butler MD    Subjective   Suellen Rubin is a 69 y.o. female  who presents for chronic axial low back pain.  She reports that she has had longstanding low back pain.  She reports that she is previously treated this pain with a combination of NSAIDs as well as narcotics.  She states that she was previously taking NSAIDs however her primary care stopped her NSAIDs and switch from narcotics.  It appears that she has been progressively escalating her narcotic dosage over the past year.  She is currently taking approximately 3 hydrocodone's daily.  She reports primarily axial back pain without any significant radicular pain.  She reports the pain is worse with any type of movement physical activity and improves with rest and medication.  She denies any red flag symptoms such as loss of bowel or bladder or any saddle anesthesia.  She does have plain films which do show facet arthropathy especially in the lower lumbar segments    Pain  Associated symptoms include arthralgias and myalgias. Pertinent negatives include no joint swelling, neck pain, numbness or weakness.        Location: Axial low back  Onset: Years ago  Duration: Slowly worsening  Timing: Constant throughout the day  Quality: Sharp, stabbing  Severity: Today: 6       Last Week: 6       Worst: 7  Modifying Factors: The pain is worse with movement and physical activity and improves with medication rest  Functional Deficit: The pain makes it difficult for her to perform her activities of daily living    Physical Therapy: no    Interval Update 06/21/2024: Relatively unchanged.  May be slightly better.  Continues with hydrocodone 3 times daily.    The following portions of the patient's history were reviewed and updated as appropriate: allergies, current medications, past family history, past medical history, past social  history, past surgical history and problem list.    Procedures:  7/6/2023: Bilateral L4-5, L5-S1 LMBB diagnostic: 90% benefit for 3 days  12/4/2023: Bilateral L4-5, 5 S1 RFA: Unclear benefit      Current Outpatient Medications:     Accu-Chek Guide test strip, USE AS INSTRUCTED, Disp: 50 each, Rfl: 12    atorvastatin (LIPITOR) 20 MG tablet, Take 1 tablet by mouth Daily., Disp: 90 tablet, Rfl: 3    baclofen (LIORESAL) 10 MG tablet, Take 1 tablet by mouth 3 (Three) Times a Day., Disp: 90 tablet, Rfl: 0    Continuous Blood Gluc  (FreeStyle Dolores 3 [place]) device, Use 1 Device Continuous., Disp: 1 each, Rfl: 0    Continuous Blood Gluc Sensor (FreeStyle Dolores 3 Sensor) misc, Use 1 Piece Every 14 (Fourteen) Days., Disp: 6 each, Rfl: 3    gabapentin (NEURONTIN) 300 MG capsule, Take 2 capsules by mouth Every Night., Disp: 180 capsule, Rfl: 1    glimepiride (AMARYL) 4 MG tablet, Take 1 tablet by mouth 2 (Two) Times a Day., Disp: 180 tablet, Rfl: 3    [START ON 8/23/2024] HYDROcodone-acetaminophen (NORCO) 7.5-325 MG per tablet, Take 1 tablet by mouth Every 8 (Eight) Hours As Needed for Moderate Pain., Disp: 90 tablet, Rfl: 0    [START ON 7/26/2024] HYDROcodone-acetaminophen (NORCO) 7.5-325 MG per tablet, Take 1 tablet by mouth Every 8 (Eight) Hours As Needed for Moderate Pain., Disp: 90 tablet, Rfl: 0    [START ON 6/28/2024] HYDROcodone-acetaminophen (NORCO) 7.5-325 MG per tablet, Take 1 tablet by mouth Every 8 (Eight) Hours As Needed for Moderate Pain., Disp: 90 tablet, Rfl: 0    hydrOXYzine (ATARAX) 25 MG tablet, Take 1 tablet by mouth 3 (Three) Times a Day As Needed for Itching., Disp: 90 tablet, Rfl: 0    levothyroxine (SYNTHROID, LEVOTHROID) 25 MCG tablet, Take 1 tablet by mouth Daily., Disp: 90 tablet, Rfl: 1    loperamide (IMODIUM) 2 MG capsule, Take 1 capsule by mouth 4 (Four) Times a Day As Needed for Diarrhea., Disp: , Rfl:     losartan (COZAAR) 100 MG tablet, Take 1 tablet by mouth Daily., Disp: 90 tablet,  Rfl: 3    metFORMIN (GLUCOPHAGE) 500 MG tablet, TAKE 2 TABLETS BY MOUTH TWICE DAILY with meals, Disp: 360 tablet, Rfl: 3    nystatin (MYCOSTATIN) 020926 UNIT/GM powder, Apply  topically to the appropriate area as directed 3 (Three) Times a Day., Disp: 60 g, Rfl: 3    ondansetron (ZOFRAN) 4 MG tablet, TAKE 1 TABLET BY MOUTH EVERY 8 HOURS AS NEEDED FOR NAUSEA OR VOMITING., Disp: 20 tablet, Rfl: 1    polyethylene glycol (MiraLax) 17 GM/SCOOP powder, Take 17 g by mouth Daily., Disp: 578 g, Rfl: 3    SITagliptin (Januvia) 100 MG tablet, Take 1 tablet by mouth Daily., Disp: 90 tablet, Rfl: 3    vitamin B-12 (CYANOCOBALAMIN) 1000 MCG tablet, TAKE 1 TABLET BY MOUTH DAILY, Disp: 90 tablet, Rfl: 1    Review of Systems   Musculoskeletal:  Positive for arthralgias, back pain, gait problem and myalgias. Negative for joint swelling, neck pain and neck stiffness.   Neurological:  Negative for weakness and numbness.       Vitals:    06/21/24 1419   BP: 125/74   Pulse: 95   Resp: 16   SpO2: 92%   PainSc:   3       Urine Drug Screen: 3/22/2024  Appropriate: Yes    Objective   Physical Exam  Vitals and nursing note reviewed.   Constitutional:       General: She is not in acute distress.     Appearance: Normal appearance. She is well-developed and normal weight.   Neck:      Trachea: No tracheal deviation.   Musculoskeletal:      Comments: Lumbar Spine Exam:  Tender to palpation over the lumbar paraspinal musculature Yes  Limited range of motion secondary to pain Yes  Facet loading positive: bilateral  Facets tender to palpation: bilateral  Straight leg raise test positive: Negative       Neurological:      General: No focal deficit present.      Mental Status: She is alert.      Sensory: No sensory deficit.      Motor: No weakness.           Assessment & Plan   Problems Addressed this Visit       DDD (degenerative disc disease), lumbar    Relevant Medications    HYDROcodone-acetaminophen (NORCO) 7.5-325 MG per tablet (Start on  8/23/2024)    HYDROcodone-acetaminophen (NORCO) 7.5-325 MG per tablet (Start on 7/26/2024)    HYDROcodone-acetaminophen (NORCO) 7.5-325 MG per tablet (Start on 6/28/2024)     Other Visit Diagnoses       Acute midline low back pain without sciatica        Relevant Medications    HYDROcodone-acetaminophen (NORCO) 7.5-325 MG per tablet (Start on 8/23/2024)          Diagnoses         Codes Comments    DDD (degenerative disc disease), lumbar     ICD-10-CM: M51.36  ICD-9-CM: 722.52     Acute midline low back pain without sciatica     ICD-10-CM: M54.50  ICD-9-CM: 724.2             Plan:  Continue with hydrocodone 7.5 mg 3 times daily  Unclear benefit from RFA  UDS inspect appropriate  If her pain worsens, we can obtain new imaging  --- Follow-up 3 months           INSPECT REPORT    As part of the patient's treatment plan, I may be prescribing controlled substances. The patient has been made aware of appropriate use of such medications, including potential risk of somnolence, limited ability to drive and/or work safely, and the potential for dependence or overdose. It has also bee made clear that these medications are for use by this patient only, without concomitant use of alcohol or other substances unless prescribed.     Patient has completed prescribing agreement detailing terms of continued prescribing of controlled substances, including monitoring ORBYN reports, urine drug screening, and pill counts if necessary. The patient is aware that inappropriate use will results in cessation of prescribing such medications.    INSPECT report has been reviewed and scanned into the patient's chart.    As the clinician, I personally reviewed the INSPECT from 6/19/2024.    History and physical exam exhibit continued safe and appropriate use of controlled substances.      EMR Dragon/Transcription disclaimer:   Much of this encounter note is an electronic transcription/translation of spoken language to printed text. The electronic  translation of spoken language may permit erroneous, or at times, nonsensical words or phrases to be inadvertently transcribed; Although I have reviewed the note for such errors, some may still exist.

## 2024-07-08 DIAGNOSIS — E11.40 TYPE 2 DIABETES MELLITUS WITH DIABETIC NEUROPATHY, WITHOUT LONG-TERM CURRENT USE OF INSULIN: ICD-10-CM

## 2024-07-08 DIAGNOSIS — R79.89 ELEVATED TSH: ICD-10-CM

## 2024-07-08 DIAGNOSIS — M51.36 DDD (DEGENERATIVE DISC DISEASE), LUMBAR: ICD-10-CM

## 2024-07-08 RX ORDER — GABAPENTIN 300 MG/1
600 CAPSULE ORAL NIGHTLY
Qty: 180 CAPSULE | Refills: 1 | Status: SHIPPED | OUTPATIENT
Start: 2024-07-08

## 2024-07-08 RX ORDER — LEVOTHYROXINE SODIUM 0.03 MG/1
25 TABLET ORAL DAILY
Qty: 90 TABLET | Refills: 1 | Status: SHIPPED | OUTPATIENT
Start: 2024-07-08

## 2024-07-17 ENCOUNTER — TELEPHONE (OUTPATIENT)
Dept: FAMILY MEDICINE CLINIC | Facility: CLINIC | Age: 69
End: 2024-07-17
Payer: MEDICARE

## 2024-07-17 NOTE — TELEPHONE ENCOUNTER
Attempted to contact the patient and inform her that there are lab orders overdue to be completed prior to her next appointment. No answer and unable to lvm due to full vm box.

## 2024-08-01 ENCOUNTER — TELEPHONE (OUTPATIENT)
Dept: FAMILY MEDICINE CLINIC | Facility: CLINIC | Age: 69
End: 2024-08-01
Payer: MEDICARE

## 2024-08-01 NOTE — TELEPHONE ENCOUNTER
Called and informed the patient that she is overdue to complete labs prior to appt on 8/12. She voiced understanding.

## 2024-08-05 DIAGNOSIS — E53.8 B12 DEFICIENCY: ICD-10-CM

## 2024-08-05 RX ORDER — LANOLIN ALCOHOL/MO/W.PET/CERES
1000 CREAM (GRAM) TOPICAL DAILY
Qty: 90 TABLET | Refills: 1 | Status: SHIPPED | OUTPATIENT
Start: 2024-08-05

## 2024-08-09 NOTE — PROGRESS NOTES
Chief Complaint  Diabetes, Hyperlipidemia, Hypothyroidism, and vitamin B12 deficiency    History of Present Illness  Suellen Rubin presents today with her sister Jacki, for follow up on diabetes, hyperlipidemia, hypothyroidism, and vitamin B12 deficiency she is also concerned about thumb pain and abdominal protrusion    Diabetes  Patient does not check blood sugar at home.  Associated symptoms include None  Per patient current diet is Well Balanced.  Patient does not avoid concentrated sweets.  Patient does not see podiatry.  Patient see's [facility] for diabetic eye exam and last eye exam was 07/2024.  Patient reports they are taking medications as prescribed and they are not having side effects.    Diabetic Foot Exam Performed    Hyperlipidemia  Patient is following a low cholesterol diet.   Currently is on statin therapy.  Patient reports is not exercising.  Patient reports they are taking medications as prescribed and they are not having side effects.    Hypothyroidism  Patient presents for evaluation of thyroid function.   Symptoms consist of denies fatigue, weight changes, heat/cold intolerance, bowel/skin changes or CVS symptoms.   Patient reports they are taking medications as prescribed and they are not having side effects.    Vitamin B12 Deficiency   Patient reports is currently on b12 supplement.   Symptoms include  none      Patient does have concern for a tenderness in her left thumb, states she has been overdoing it and believes she has developed a ganglion cyst.  The thumb is sore and pops every time she bends it.  She has been using some topical Aleve (with some benefit.  Camphor and menthol)    Patient Care Team:  Julissa Butler MD as PCP - General (Family Medicine)  Alfred Olsen RN as Ambulatory  ([facility])   Current Outpatient Medications on File Prior to Visit   Medication Sig    Accu-Chek Guide test strip USE AS INSTRUCTED    atorvastatin (LIPITOR) 20 MG  tablet Take 1 tablet by mouth Daily.    baclofen (LIORESAL) 10 MG tablet Take 1 tablet by mouth 3 (Three) Times a Day.    gabapentin (NEURONTIN) 300 MG capsule Take 2 capsules by mouth Every Night.    glimepiride (AMARYL) 4 MG tablet Take 1 tablet by mouth 2 (Two) Times a Day.    [START ON 8/23/2024] HYDROcodone-acetaminophen (NORCO) 7.5-325 MG per tablet Take 1 tablet by mouth Every 8 (Eight) Hours As Needed for Moderate Pain.    HYDROcodone-acetaminophen (NORCO) 7.5-325 MG per tablet Take 1 tablet by mouth Every 8 (Eight) Hours As Needed for Moderate Pain.    HYDROcodone-acetaminophen (NORCO) 7.5-325 MG per tablet Take 1 tablet by mouth Every 8 (Eight) Hours As Needed for Moderate Pain.    loperamide (IMODIUM) 2 MG capsule Take 1 capsule by mouth 4 (Four) Times a Day As Needed for Diarrhea.    losartan (COZAAR) 100 MG tablet Take 1 tablet by mouth Daily.    metFORMIN (GLUCOPHAGE) 500 MG tablet TAKE 2 TABLETS BY MOUTH TWICE DAILY with meals    nystatin (MYCOSTATIN) 985543 UNIT/GM powder Apply  topically to the appropriate area as directed 3 (Three) Times a Day.    SITagliptin (Januvia) 100 MG tablet Take 1 tablet by mouth Daily.    vitamin B-12 (CYANOCOBALAMIN) 1000 MCG tablet Take 1 tablet by mouth Daily.    [DISCONTINUED] hydrOXYzine (ATARAX) 25 MG tablet Take 1 tablet by mouth 3 (Three) Times a Day As Needed for Itching.    [DISCONTINUED] levothyroxine (SYNTHROID, LEVOTHROID) 25 MCG tablet Take 1 tablet by mouth Daily.    [DISCONTINUED] ondansetron (ZOFRAN) 4 MG tablet TAKE 1 TABLET BY MOUTH EVERY 8 HOURS AS NEEDED FOR NAUSEA OR VOMITING.    [DISCONTINUED] polyethylene glycol (MiraLax) 17 GM/SCOOP powder Take 17 g by mouth Daily.    [DISCONTINUED] Continuous Blood Gluc  (FreeStyle Dolores 3 [place]) device Use 1 Device Continuous.    [DISCONTINUED] Continuous Blood Gluc Sensor (FreeStyle Dolores 3 Sensor) misc Use 1 Piece Every 14 (Fourteen) Days.     No current facility-administered medications on file prior  "to visit.       Objective   Vital Signs:   /64 (BP Location: Right arm, Patient Position: Sitting, Cuff Size: Large Adult)   Pulse 87   Temp 98.2 °F (36.8 °C) (Temporal)   Resp 18   Ht 157.5 cm (62\")   Wt 96.7 kg (213 lb 3.2 oz)   SpO2 95%   BMI 38.99 kg/m²    BP Readings from Last 3 Encounters:   08/12/24 118/64   06/21/24 125/74   03/22/24 158/96     Wt Readings from Last 3 Encounters:   08/12/24 96.7 kg (213 lb 3.2 oz)   03/08/24 99.3 kg (219 lb)   12/04/23 95.3 kg (210 lb)         Physical Exam  Vitals and nursing note reviewed.   Constitutional:       General: She is not in acute distress.     Appearance: Normal appearance. She is well-developed. She is obese.   HENT:      Head: Normocephalic and atraumatic.   Cardiovascular:      Rate and Rhythm: Normal rate and regular rhythm.      Heart sounds: No murmur heard.  Pulmonary:      Effort: Pulmonary effort is normal.      Breath sounds: Normal breath sounds. No wheezing.   Abdominal:      Comments: Large protrusion left mid and lower abdomen consistent with ventral hernia without distinct palpable underlying hernia defect.  No significant tenderness, slight increased warmth, no redness.   Musculoskeletal:         General: Normal range of motion.      Comments: Left thumb with a small slightly tender nodule palpable at the MP joint there is a repeated catching/pop with flexion of the thumb.    Trace nonpitting edema of lower extremities bilaterally   Feet:      Right foot:      Protective Sensation: 10 sites tested.  10 sites sensed.      Skin integrity: Skin integrity normal.      Toenail Condition: Right toenails are ingrown.      Left foot:      Protective Sensation: 10 sites tested.  10 sites sensed.      Skin integrity: Skin integrity normal.      Toenail Condition: Left toenails are ingrown.   Skin:     General: Skin is warm and dry.      Findings: No rash.      Comments: Varicosities of bilateral lower extremities   Neurological:      Mental " Status: She is alert and oriented to person, place, and time.   Psychiatric:         Mood and Affect: Mood normal.         Behavior: Behavior normal.         Thought Content: Thought content normal.         Judgment: Judgment normal.            Office Visit on 08/12/2024   Component Date Value Ref Range Status    Microalbumin, Urine 08/12/2024 20   Final    Creatinine, Urine 08/12/2024 n/a   Final    Lot Number 08/12/2024 [id number]   Final    Expiration Date 08/12/2024 05/31/2025   Final     A1C Last 3 Results          10/23/2023    10:56 2/29/2024    09:00 8/7/2024    08:23   HGBA1C Last 3 Results   Hemoglobin A1C 7.9  9.4  8.6      Lab Results   Component Value Date    CHLPL 120 10/23/2023    TRIG 172 (H) 10/23/2023    HDL 27 (L) 10/23/2023    LDL 64 10/23/2023     Lab Results   Component Value Date    TSH 5.130 (H) 08/07/2024     Lab Results   Component Value Date    GLUCOSE 177 (H) 08/07/2024    BUN 14 08/07/2024    CREATININE 1.04 (H) 08/07/2024    EGFRIFNONA 72 12/15/2021    EGFRIFAFRI 83 12/15/2021    BCR 13 08/07/2024    K 5.0 08/07/2024    CO2 21 08/07/2024    CALCIUM 9.2 08/07/2024    PROTENTOTREF 7.7 08/07/2024    ALBUMIN 4.0 08/07/2024    LABIL2 1.1 (L) 02/29/2024    AST 37 08/07/2024    ALT 25 08/07/2024     Lab Results   Component Value Date    WBC 6.8 10/23/2023    HGB 14.5 10/23/2023    HCT 44.0 10/23/2023    MCV 92 10/23/2023     10/23/2023                      Assessment and Plan    Diagnoses and all orders for this visit:    1. Type 2 diabetes mellitus with hyperglycemia, without long-term current use of insulin (Primary)  -     POCT microalbumin  -     Tirzepatide (Mounjaro) 2.5 MG/0.5ML solution pen-injector pen; Inject 0.5 mL under the skin into the appropriate area as directed 1 (One) Time Per Week.  Dispense: 2 mL; Refill: 0    2. Mixed hyperlipidemia    3. Acquired hypothyroidism    4. B12 deficiency    5. Body mass index (BMI) of 38.0 to 38.9 in adult    6. Former smoker    7.  Trigger thumb of left hand  -     Diclofenac Sodium (VOLTAREN) 1 % gel gel; Apply 4 g topically to the appropriate area as directed 4 (Four) Times a Day. Dose is 4 grams for knees, ankles, feet.  Dose is 2 grams for elbows, wrists, hands.  Dispense: 350 g; Refill: 3    8. Constipation, unspecified constipation type  -     polyethylene glycol (MiraLax) 17 GM/SCOOP powder; Take 17 g by mouth Daily.  Dispense: 578 g; Refill: 3    9. Elevated TSH  -     levothyroxine (SYNTHROID, LEVOTHROID) 50 MCG tablet; Take 1 tablet by mouth Daily.  Dispense: 90 tablet; Refill: 1    10. Gastroesophageal reflux disease without esophagitis  -     omeprazole (priLOSEC) 20 MG capsule; Take 1 capsule by mouth Daily.  Dispense: 90 capsule; Refill: 3    11. Pruritic condition  -     hydrOXYzine (ATARAX) 25 MG tablet; Take 1 tablet by mouth 3 (Three) Times a Day As Needed for Itching.  Dispense: 90 tablet; Refill: 1    12. Nausea  -     ondansetron (ZOFRAN) 4 MG tablet; Take 1 tablet by mouth Every 8 (Eight) Hours As Needed for Nausea or Vomiting.  Dispense: 20 tablet; Refill: 1    13. Hernia of abdominal wall    Diabetes in adequate control, after discussion she is willing to try GLP-1 (previously cost prohibitive), hopefully this could also facilitate weight reduction which would be helpful for hernia and potential future hernia surgery.  If pain does not improve she would want referral back to Dr. Griffiths who originally evaluated the hernia several years ago.  Did advise that she could be at some risk of hypoglycemia especially if tirzepatide is increased to higher doses and sitagliptin may need to be reduced or discontinued in the future.  I do believe with her current hyperglycemia benefit does outweigh risk of continuing both medications.  She will also continue metformin for now    Hypertension at goal continue current medications    Hypothyroidism TSH not in treatment goal will increase levothyroxine from 25 to 50 mcg  daily.    Advised to avoid nonsteroidal anti-inflammatory such as ibuprofen, Motrin, Midol, Advil, naproxen, or Aleve.  She is receiving hydrocodone through Dr. Recinos, may take Tylenol and may continue to use topicals such as Aleve prescription given for diclofenac/Voltaren gel.  If pain progresses or no significant improvement would recommend referral to hand surgeons for consideration of steroid injection or additional intervention.    Reflux, somewhat inadequately controlled by as needed famotidine.  She has previously used omeprazole, new prescription for omeprazole and information on GERD specifically diet to improve symptoms.      Medications Discontinued During This Encounter   Medication Reason    Continuous Blood Gluc  (FreeStyle Dolores 3 [place]) device *Therapy completed    Continuous Blood Gluc Sensor (FreeStyle Dolores 3 Sensor) misc *Therapy completed    ondansetron (ZOFRAN) 4 MG tablet Reorder    hydrOXYzine (ATARAX) 25 MG tablet Reorder    polyethylene glycol (MiraLax) 17 GM/SCOOP powder Reorder    levothyroxine (SYNTHROID, LEVOTHROID) 25 MCG tablet        I spent 48 minutes caring for Suellen on this date of service. This time includes time spent by me in the following activities:reviewing tests, obtaining and/or reviewing a separately obtained history, performing a medically appropriate examination and/or evaluation , counseling and educating the patient/family/caregiver, ordering medications, tests, or procedures, referring and communicating with other health care professionals , documenting information in the medical record, and care coordination  Follow Up     Return in about 4 weeks (around 9/9/2024) for diabetes.    Patient was given instructions and counseling regarding her condition or for health maintenance advice. Please see specific information pulled into the AVS if appropriate.

## 2024-08-12 ENCOUNTER — OFFICE VISIT (OUTPATIENT)
Dept: FAMILY MEDICINE CLINIC | Facility: CLINIC | Age: 69
End: 2024-08-12
Payer: MEDICARE

## 2024-08-12 VITALS
BODY MASS INDEX: 39.23 KG/M2 | WEIGHT: 213.2 LBS | HEIGHT: 62 IN | SYSTOLIC BLOOD PRESSURE: 118 MMHG | DIASTOLIC BLOOD PRESSURE: 64 MMHG | OXYGEN SATURATION: 95 % | RESPIRATION RATE: 18 BRPM | TEMPERATURE: 98.2 F | HEART RATE: 87 BPM

## 2024-08-12 DIAGNOSIS — L29.9 PRURITIC CONDITION: ICD-10-CM

## 2024-08-12 DIAGNOSIS — Z87.891 FORMER SMOKER: ICD-10-CM

## 2024-08-12 DIAGNOSIS — R79.89 ELEVATED TSH: ICD-10-CM

## 2024-08-12 DIAGNOSIS — E78.2 MIXED HYPERLIPIDEMIA: ICD-10-CM

## 2024-08-12 DIAGNOSIS — E53.8 B12 DEFICIENCY: ICD-10-CM

## 2024-08-12 DIAGNOSIS — K43.9 HERNIA OF ABDOMINAL WALL: ICD-10-CM

## 2024-08-12 DIAGNOSIS — K21.9 GASTROESOPHAGEAL REFLUX DISEASE WITHOUT ESOPHAGITIS: ICD-10-CM

## 2024-08-12 DIAGNOSIS — E03.9 ACQUIRED HYPOTHYROIDISM: ICD-10-CM

## 2024-08-12 DIAGNOSIS — E11.65 TYPE 2 DIABETES MELLITUS WITH HYPERGLYCEMIA, WITHOUT LONG-TERM CURRENT USE OF INSULIN: Primary | ICD-10-CM

## 2024-08-12 DIAGNOSIS — K59.00 CONSTIPATION, UNSPECIFIED CONSTIPATION TYPE: ICD-10-CM

## 2024-08-12 DIAGNOSIS — R11.0 NAUSEA: ICD-10-CM

## 2024-08-12 DIAGNOSIS — M65.312 TRIGGER THUMB OF LEFT HAND: ICD-10-CM

## 2024-08-12 LAB
EXPIRATION DATE: NORMAL
Lab: NORMAL
POC CREATININE URINE: NORMAL
POC MICROALBUMIN URINE: 20

## 2024-08-12 PROCEDURE — 82044 UR ALBUMIN SEMIQUANTITATIVE: CPT | Performed by: FAMILY MEDICINE

## 2024-08-12 PROCEDURE — 99215 OFFICE O/P EST HI 40 MIN: CPT | Performed by: FAMILY MEDICINE

## 2024-08-12 PROCEDURE — 3052F HG A1C>EQUAL 8.0%<EQUAL 9.0%: CPT | Performed by: FAMILY MEDICINE

## 2024-08-12 PROCEDURE — 1159F MED LIST DOCD IN RCRD: CPT | Performed by: FAMILY MEDICINE

## 2024-08-12 PROCEDURE — 3078F DIAST BP <80 MM HG: CPT | Performed by: FAMILY MEDICINE

## 2024-08-12 PROCEDURE — 3074F SYST BP LT 130 MM HG: CPT | Performed by: FAMILY MEDICINE

## 2024-08-12 PROCEDURE — 1160F RVW MEDS BY RX/DR IN RCRD: CPT | Performed by: FAMILY MEDICINE

## 2024-08-12 PROCEDURE — 1126F AMNT PAIN NOTED NONE PRSNT: CPT | Performed by: FAMILY MEDICINE

## 2024-08-12 PROCEDURE — G2211 COMPLEX E/M VISIT ADD ON: HCPCS | Performed by: FAMILY MEDICINE

## 2024-08-12 RX ORDER — ONDANSETRON 4 MG/1
4 TABLET, FILM COATED ORAL EVERY 8 HOURS PRN
Qty: 20 TABLET | Refills: 1 | Status: SHIPPED | OUTPATIENT
Start: 2024-08-12

## 2024-08-12 RX ORDER — LEVOTHYROXINE SODIUM 0.05 MG/1
50 TABLET ORAL DAILY
Qty: 90 TABLET | Refills: 1 | Status: SHIPPED | OUTPATIENT
Start: 2024-08-12

## 2024-08-12 RX ORDER — HYDROXYZINE HYDROCHLORIDE 25 MG/1
25 TABLET, FILM COATED ORAL 3 TIMES DAILY PRN
Qty: 90 TABLET | Refills: 1 | Status: SHIPPED | OUTPATIENT
Start: 2024-08-12

## 2024-08-12 RX ORDER — OMEPRAZOLE 20 MG/1
20 CAPSULE, DELAYED RELEASE ORAL DAILY
Qty: 90 CAPSULE | Refills: 3 | Status: SHIPPED | OUTPATIENT
Start: 2024-08-12

## 2024-08-12 RX ORDER — POLYETHYLENE GLYCOL 3350 17 G/17G
17 POWDER, FOR SOLUTION ORAL DAILY
Qty: 578 G | Refills: 3 | Status: SHIPPED | OUTPATIENT
Start: 2024-08-12

## 2024-08-19 ENCOUNTER — PATIENT OUTREACH (OUTPATIENT)
Dept: CASE MANAGEMENT | Facility: OTHER | Age: 69
End: 2024-08-19
Payer: MEDICARE

## 2024-08-19 NOTE — OUTREACH NOTE
AMBULATORY CASE MANAGEMENT NOTE    Names and Relationships of Patient/Support Persons: Contact: Caryn Suellen C; Relationship: Self -     Patient Outreach    Pt identified for proactive outreach. RN-ACM outreach call made to pt. Explained role of RN-ACM and reason for call. Pt reports to be doing well. She does not monitor her BG at home. Reviewed A1C readings. Pt reports to be compliant with medications. Pt states she plans to start Mounjaro when she gets it on Wednesday this week. Disease education provided. Pt reports diet is pretty good, she is active. Reviewed SDOH. No needs per pt. Pt states she has good family support. She has next routine PCP appt scheduled. No questions per pt. Pt engaged in HRCM services. Care plan created this call. Advised pt to call RN-ACM or [facility] 24 hour nurse line with any needs. Follow up outreach scheduled for 1 month.     Send Education  Questions/Answers      Flowsheet Row Most Recent Value   Annual Wellness Visit:  Patient Has Completed   Other Patient Education/Resources  24/7 [facility] Nurse Call Line   24/7 Nurse Call Line Education Method Verbal          SDOH updated and reviewed with the patient during this program:  Financial Resource Strain: Low Risk  (8/19/2024)    Overall Financial Resource Strain (CARDIA)     Difficulty of Paying Living Expenses: Not very hard      --     Food Insecurity: No Food Insecurity (8/19/2024)    Hunger Vital Sign     Worried About Running Out of Food in the Last Year: Never true     Ran Out of Food in the Last Year: Never true      --     Transportation Needs: No Transportation Needs (8/19/2024)    PRAPARE - Transportation     Lack of Transportation (Medical): No     Lack of Transportation (Non-Medical): No         Alfred GALARZA  Ambulatory Case Management    8/19/2024, 11:46 EDT

## 2024-08-21 ENCOUNTER — TELEPHONE (OUTPATIENT)
Dept: FAMILY MEDICINE CLINIC | Facility: CLINIC | Age: 69
End: 2024-08-21
Payer: MEDICARE

## 2024-08-21 DIAGNOSIS — E11.40 TYPE 2 DIABETES MELLITUS WITH DIABETIC NEUROPATHY, WITHOUT LONG-TERM CURRENT USE OF INSULIN: Primary | ICD-10-CM

## 2024-08-21 RX ORDER — PIOGLITAZONEHYDROCHLORIDE 15 MG/1
15 TABLET ORAL DAILY
Qty: 30 TABLET | Refills: 0 | Status: SHIPPED | OUTPATIENT
Start: 2024-08-21

## 2024-08-21 NOTE — TELEPHONE ENCOUNTER
I called and informed the patient. She voiced understanding and is agreeable to starting Actos and keeping blood sugar log to bring with to her appt in September.

## 2024-08-21 NOTE — TELEPHONE ENCOUNTER
PATIENT STATES HER CURRENT MEDICATIONS ARE TOO EXPENSIVE AND SHE WOULD LIKE TO DISCUSS CHANGES TO MAKE IT MORE AFFORDABLE.

## 2024-08-21 NOTE — TELEPHONE ENCOUNTER
Please contact patient and ask if she is going to take Mounjuro, suspect this is cost prohibitive as well.  She is already taking metformin 500 mg 2 twice daily glimepirid and, Amaryl 4 mg twice daily, these of lower already at highest dose.  She should continue these medications  The only other appropriate inexpensive diabetes medications would be Actos, I think I have tried to avoid insulin due to risk of lower extremity edema but we can give it a try.  If glucose readings increase significantly we will want to increase Actos in about 1 month.,  Bring home glucose readings to appointment scheduled on 9/18/2024.

## 2024-08-21 NOTE — TELEPHONE ENCOUNTER
I called and spoke with the patient to confirm which medication she is referring to. She states she would like an alternative to Januvia. It is too expensive right now as she is responsible for the next $2500 out of pocket until insurance will begin to cover anything.

## 2024-08-30 DIAGNOSIS — L29.9 PRURITIC CONDITION: ICD-10-CM

## 2024-08-30 RX ORDER — HYDROXYZINE HYDROCHLORIDE 25 MG/1
25 TABLET, FILM COATED ORAL 3 TIMES DAILY PRN
Qty: 90 TABLET | Refills: 1 | Status: SHIPPED | OUTPATIENT
Start: 2024-08-30

## 2024-09-17 NOTE — PROGRESS NOTES
Chief Complaint  Diabetes    History of Present Illness  Suellen Rubin presents today with son Sohail for follow up on diabetes  She states her son has additonal concerns.   Concerned about llq abd hernia.  She has had evaluation by surgeon Dr. Griffiths in the past and elected not to have surgical repair.    Do take 2 fiber gummies every day and imodium when diarrhea from IBS flairs.     Diabetes  Patient does check blood sugar at home 1 times daily.  Per patient fasting blood sugars range between 129 to 205.  At last visit on August 12, 2024 we initiated prescription for Mounjaro.  She is starting the medication and is due for a refill.   Associated symptoms include Neuropathy  Per patient current diet is Well Balanced.  Patient does not avoid concentrated sweets.  Patient does not see podiatry.  Patient see's [facility] for diabetic eye exam and last eye exam was June 2024.  Patient reports they are taking medications as prescribed and they are not having side effects.    Influenza immunization was not given due to patient refusal.     Patient Care Team:  Julissa Butler MD as PCP - General (Family Medicine)  Alfred Olsen RN as Ambulatory  ([facility] [place]note 2[place]   Current Outpatient Medications on File Prior to Visit   Medication Sig    atorvastatin (LIPITOR) 20 MG tablet Take 1 tablet by mouth Daily.    baclofen (LIORESAL) 10 MG tablet Take 1 tablet by mouth 3 (Three) Times a Day.    Diclofenac Sodium (VOLTAREN) 1 % gel gel Apply 4 g topically to the appropriate area as directed 4 (Four) Times a Day. Dose is 4 grams for knees, ankles, feet.  Dose is 2 grams for elbows, wrists, hands.    gabapentin (NEURONTIN) 300 MG capsule Take 2 capsules by mouth Every Night.    glimepiride (AMARYL) 4 MG tablet Take 1 tablet by mouth 2 (Two) Times a Day.    hydrOXYzine (ATARAX) 25 MG tablet Take 1 tablet by mouth 3 (Three) Times a Day As Needed for Itching.    levothyroxine (SYNTHROID, LEVOTHROID) 50 MCG  "tablet Take 1 tablet by mouth Daily.    loperamide (IMODIUM) 2 MG capsule Take 1 capsule by mouth 4 (Four) Times a Day As Needed for Diarrhea.    metFORMIN (GLUCOPHAGE) 500 MG tablet TAKE 2 TABLETS BY MOUTH TWICE DAILY with meals    nystatin (MYCOSTATIN) 810648 UNIT/GM powder Apply  topically to the appropriate area as directed 3 (Three) Times a Day.    omeprazole (priLOSEC) 20 MG capsule Take 1 capsule by mouth Daily.    polyethylene glycol (MiraLax) 17 GM/SCOOP powder Take 17 g by mouth Daily.    vitamin B-12 (CYANOCOBALAMIN) 1000 MCG tablet Take 1 tablet by mouth Daily.    SITagliptin (Januvia) 100 MG tablet Take 1 tablet by mouth Daily. (Patient not taking: Reported on 9/18/2024)     No current facility-administered medications on file prior to visit.       Objective   Vital Signs:   /68 (BP Location: Right arm, Patient Position: Sitting, Cuff Size: Large Adult)   Pulse 96   Temp 98 °F (36.7 °C) (Temporal)   Resp 18   Ht 157.5 cm (62\")   Wt 95.2 kg (209 lb 12.8 oz)   SpO2 95%   BMI 38.37 kg/m²    BP Readings from Last 3 Encounters:   09/20/24 143/87   09/18/24 130/68   08/12/24 118/64     Wt Readings from Last 3 Encounters:   09/20/24 94.8 kg (209 lb)   09/18/24 95.2 kg (209 lb 12.8 oz)   08/12/24 96.7 kg (213 lb 3.2 oz)         Physical Exam  Vitals and nursing note reviewed.   Constitutional:       General: She is not in acute distress.     Appearance: Normal appearance. She is well-developed. She is obese.   HENT:      Head: Normocephalic and atraumatic.   Cardiovascular:      Rate and Rhythm: Normal rate and regular rhythm.      Heart sounds: No murmur heard.  Pulmonary:      Effort: Pulmonary effort is normal.      Breath sounds: Normal breath sounds. No wheezing.   Abdominal:      General: Bowel sounds are normal.      Palpations: Abdomen is soft. There is no mass.      Tenderness: There is no abdominal tenderness. There is no right CVA tenderness, left CVA tenderness or guarding.      Hernia: " A hernia is present.      Comments: Large protrusion left mid and lower abdomen consistent with ventral hernia without distinct palpable underlying hernia defect.  No significant tenderness, slight increased warmth, no redness.    Musculoskeletal:         General: Normal range of motion.   Skin:     General: Skin is warm and dry.      Findings: No rash.   Neurological:      Mental Status: She is alert and oriented to person, place, and time.   Psychiatric:         Mood and Affect: Mood normal.         Behavior: Behavior normal.         Thought Content: Thought content normal.         Judgment: Judgment normal.            No visits with results within 1 Day(s) from this visit.   Latest known visit with results is:   Office Visit on 08/12/2024   Component Date Value Ref Range Status    Microalbumin, Urine 08/12/2024 20   Final    Creatinine, Urine 08/12/2024 n/a   Final    Lot Number 08/12/2024 [id number]   Final    Expiration Date 08/12/2024 05/31/2025   Final     A1C Last 3 Results          2/29/2024    09:00 8/7/2024    08:23   HGBA1C Last 3 Results   Hemoglobin A1C 9.4  8.6      Lab Results   Component Value Date    CHLPL 120 10/23/2023    TRIG 172 (H) 10/23/2023    HDL 27 (L) 10/23/2023    LDL 64 10/23/2023     Lab Results   Component Value Date    TSH 5.130 (H) 08/07/2024     Lab Results   Component Value Date    GLUCOSE 177 (H) 08/07/2024    BUN 14 08/07/2024    CREATININE 1.04 (H) 08/07/2024    EGFRIFNONA 72 12/15/2021    EGFRIFAFRI 83 12/15/2021    BCR 13 08/07/2024    K 5.0 08/07/2024    CO2 21 08/07/2024    CALCIUM 9.2 08/07/2024    PROTENTOTREF 7.7 08/07/2024    ALBUMIN 4.0 08/07/2024    LABIL2 1.1 (L) 02/29/2024    AST 37 08/07/2024    ALT 25 08/07/2024     Lab Results   Component Value Date    WBC 6.8 10/23/2023    HGB 14.5 10/23/2023    HCT 44.0 10/23/2023    MCV 92 10/23/2023     10/23/2023                      Assessment and Plan    Diagnoses and all orders for this visit:    1. Type 2 diabetes  mellitus with diabetic neuropathy, without long-term current use of insulin (Primary)  -     Discontinue: Tirzepatide (MOUNJARO) 5 MG/0.5ML solution pen-injector pen; Inject 0.5 mL under the skin into the appropriate area as directed 1 (One) Time Per Week.  Dispense: 2 mL; Refill: 0    2. Class 2 severe obesity due to excess calories with serious comorbidity and body mass index (BMI) of 38.0 to 38.9 in adult    3. Former smoker    4. Type 2 diabetes mellitus with hyperglycemia, without long-term current use of insulin  -     glucose blood (Accu-Chek Guide) test strip; Use as instructed  Dispense: 50 each; Refill: 12    5. Hernia of abdominal wall    6. Irritable bowel syndrome with diarrhea  -     saccharomyces boulardii (Florastor) 250 MG capsule; Take 1 capsule by mouth 2 (Two) Times a Day.  Dispense: 60 capsule; Refill: 3      Diabetes and obesity, 6 weeks ago A1c was remaining above goal, patient has started on Mounjuro and is not having any significant side effects increasing from 2.5 to 5 mg weekly, she reports taking injections on Wednesdays.    Ventral abdominal hernia offered referral back to surgeon to discuss surgical intervention, did recommend continued weight loss.    IBS, encouraged to start probiotic continue consistent fiber in diet and used Imodium when necessary    Medications Discontinued During This Encounter   Medication Reason    Tirzepatide (Mounjaro) 2.5 MG/0.5ML solution pen-injector pen Dose adjustment    Accu-Chek Guide test strip Reorder         Follow Up     Return in 2 months (on 11/18/2024) for Medicare Wellness, diabetes.    Patient was given instructions and counseling regarding her condition or for health maintenance advice. Please see specific information pulled into the AVS if appropriate.

## 2024-09-18 ENCOUNTER — OFFICE VISIT (OUTPATIENT)
Dept: FAMILY MEDICINE CLINIC | Facility: CLINIC | Age: 69
End: 2024-09-18
Payer: MEDICARE

## 2024-09-18 VITALS
DIASTOLIC BLOOD PRESSURE: 68 MMHG | WEIGHT: 209.8 LBS | SYSTOLIC BLOOD PRESSURE: 130 MMHG | OXYGEN SATURATION: 95 % | TEMPERATURE: 98 F | HEART RATE: 96 BPM | RESPIRATION RATE: 18 BRPM | BODY MASS INDEX: 38.61 KG/M2 | HEIGHT: 62 IN

## 2024-09-18 DIAGNOSIS — E66.812 CLASS 2 SEVERE OBESITY DUE TO EXCESS CALORIES WITH SERIOUS COMORBIDITY AND BODY MASS INDEX (BMI) OF 38.0 TO 38.9 IN ADULT: ICD-10-CM

## 2024-09-18 DIAGNOSIS — E11.65 TYPE 2 DIABETES MELLITUS WITH HYPERGLYCEMIA, WITHOUT LONG-TERM CURRENT USE OF INSULIN: ICD-10-CM

## 2024-09-18 DIAGNOSIS — Z87.891 FORMER SMOKER: ICD-10-CM

## 2024-09-18 DIAGNOSIS — K43.9 HERNIA OF ABDOMINAL WALL: ICD-10-CM

## 2024-09-18 DIAGNOSIS — E11.40 TYPE 2 DIABETES MELLITUS WITH DIABETIC NEUROPATHY, WITHOUT LONG-TERM CURRENT USE OF INSULIN: Primary | ICD-10-CM

## 2024-09-18 DIAGNOSIS — E66.01 CLASS 2 SEVERE OBESITY DUE TO EXCESS CALORIES WITH SERIOUS COMORBIDITY AND BODY MASS INDEX (BMI) OF 38.0 TO 38.9 IN ADULT: ICD-10-CM

## 2024-09-18 DIAGNOSIS — K58.0 IRRITABLE BOWEL SYNDROME WITH DIARRHEA: ICD-10-CM

## 2024-09-18 PROCEDURE — 3052F HG A1C>EQUAL 8.0%<EQUAL 9.0%: CPT | Performed by: FAMILY MEDICINE

## 2024-09-18 PROCEDURE — G2211 COMPLEX E/M VISIT ADD ON: HCPCS | Performed by: FAMILY MEDICINE

## 2024-09-18 PROCEDURE — 99214 OFFICE O/P EST MOD 30 MIN: CPT | Performed by: FAMILY MEDICINE

## 2024-09-18 PROCEDURE — 1125F AMNT PAIN NOTED PAIN PRSNT: CPT | Performed by: FAMILY MEDICINE

## 2024-09-18 PROCEDURE — 3075F SYST BP GE 130 - 139MM HG: CPT | Performed by: FAMILY MEDICINE

## 2024-09-18 PROCEDURE — 3078F DIAST BP <80 MM HG: CPT | Performed by: FAMILY MEDICINE

## 2024-09-18 RX ORDER — BLOOD SUGAR DIAGNOSTIC
STRIP MISCELLANEOUS
Qty: 50 EACH | Refills: 12 | Status: SHIPPED | OUTPATIENT
Start: 2024-09-18

## 2024-09-18 RX ORDER — SACCHAROMYCES BOULARDII 250 MG
250 CAPSULE ORAL 2 TIMES DAILY
Qty: 60 CAPSULE | Refills: 3 | Status: SHIPPED | OUTPATIENT
Start: 2024-09-18

## 2024-09-19 ENCOUNTER — PATIENT OUTREACH (OUTPATIENT)
Dept: CASE MANAGEMENT | Facility: OTHER | Age: 69
End: 2024-09-19
Payer: MEDICARE

## 2024-09-19 RX ORDER — TIRZEPATIDE 2.5 MG/.5ML
INJECTION, SOLUTION SUBCUTANEOUS
Qty: 2 ML | Refills: 0 | OUTPATIENT
Start: 2024-09-19

## 2024-09-20 ENCOUNTER — OFFICE VISIT (OUTPATIENT)
Dept: PAIN MEDICINE | Facility: CLINIC | Age: 69
End: 2024-09-20
Payer: MEDICARE

## 2024-09-20 VITALS
SYSTOLIC BLOOD PRESSURE: 143 MMHG | RESPIRATION RATE: 16 BRPM | DIASTOLIC BLOOD PRESSURE: 87 MMHG | OXYGEN SATURATION: 94 % | WEIGHT: 209 LBS | BODY MASS INDEX: 38.23 KG/M2 | HEART RATE: 77 BPM

## 2024-09-20 DIAGNOSIS — F11.20 NARCOTIC DEPENDENCY, CONTINUOUS: Primary | ICD-10-CM

## 2024-09-20 DIAGNOSIS — M51.36 DDD (DEGENERATIVE DISC DISEASE), LUMBAR: ICD-10-CM

## 2024-09-20 DIAGNOSIS — M54.50 ACUTE MIDLINE LOW BACK PAIN WITHOUT SCIATICA: ICD-10-CM

## 2024-09-20 PROCEDURE — 1160F RVW MEDS BY RX/DR IN RCRD: CPT | Performed by: STUDENT IN AN ORGANIZED HEALTH CARE EDUCATION/TRAINING PROGRAM

## 2024-09-20 PROCEDURE — 99214 OFFICE O/P EST MOD 30 MIN: CPT | Performed by: STUDENT IN AN ORGANIZED HEALTH CARE EDUCATION/TRAINING PROGRAM

## 2024-09-20 PROCEDURE — 1125F AMNT PAIN NOTED PAIN PRSNT: CPT | Performed by: STUDENT IN AN ORGANIZED HEALTH CARE EDUCATION/TRAINING PROGRAM

## 2024-09-20 PROCEDURE — 1159F MED LIST DOCD IN RCRD: CPT | Performed by: STUDENT IN AN ORGANIZED HEALTH CARE EDUCATION/TRAINING PROGRAM

## 2024-09-20 PROCEDURE — G0463 HOSPITAL OUTPT CLINIC VISIT: HCPCS | Performed by: STUDENT IN AN ORGANIZED HEALTH CARE EDUCATION/TRAINING PROGRAM

## 2024-09-20 PROCEDURE — 3079F DIAST BP 80-89 MM HG: CPT | Performed by: STUDENT IN AN ORGANIZED HEALTH CARE EDUCATION/TRAINING PROGRAM

## 2024-09-20 PROCEDURE — 3077F SYST BP >= 140 MM HG: CPT | Performed by: STUDENT IN AN ORGANIZED HEALTH CARE EDUCATION/TRAINING PROGRAM

## 2024-09-20 RX ORDER — HYDROCODONE BITARTRATE AND ACETAMINOPHEN 7.5; 325 MG/1; MG/1
1 TABLET ORAL 4 TIMES DAILY PRN
Qty: 120 TABLET | Refills: 0 | Status: SHIPPED | OUTPATIENT
Start: 2024-10-24

## 2024-09-20 RX ORDER — HYDROCODONE BITARTRATE AND ACETAMINOPHEN 7.5; 325 MG/1; MG/1
1 TABLET ORAL 4 TIMES DAILY PRN
Qty: 120 TABLET | Refills: 0 | Status: SHIPPED | OUTPATIENT
Start: 2024-09-25

## 2024-09-20 RX ORDER — HYDROCODONE BITARTRATE AND ACETAMINOPHEN 7.5; 325 MG/1; MG/1
1 TABLET ORAL 4 TIMES DAILY PRN
Qty: 120 TABLET | Refills: 0 | Status: SHIPPED | OUTPATIENT
Start: 2024-11-22

## 2024-09-23 DIAGNOSIS — E11.40 TYPE 2 DIABETES MELLITUS WITH DIABETIC NEUROPATHY, WITHOUT LONG-TERM CURRENT USE OF INSULIN: ICD-10-CM

## 2024-09-23 RX ORDER — PIOGLITAZONEHYDROCHLORIDE 15 MG/1
15 TABLET ORAL DAILY
Qty: 30 TABLET | Refills: 0 | Status: SHIPPED | OUTPATIENT
Start: 2024-09-23

## 2024-10-01 ENCOUNTER — TELEPHONE (OUTPATIENT)
Dept: FAMILY MEDICINE CLINIC | Facility: CLINIC | Age: 69
End: 2024-10-01
Payer: MEDICARE

## 2024-10-01 DIAGNOSIS — E11.40 TYPE 2 DIABETES MELLITUS WITH DIABETIC NEUROPATHY, WITHOUT LONG-TERM CURRENT USE OF INSULIN: Primary | ICD-10-CM

## 2024-10-01 RX ORDER — LANCETS
1 EACH MISCELLANEOUS DAILY
Qty: 102 EACH | Refills: 3 | Status: SHIPPED | OUTPATIENT
Start: 2024-10-01

## 2024-10-01 NOTE — TELEPHONE ENCOUNTER
Caller: Suellen Rubin    Relationship: Self    Best call back number: [phone]     What medication are you requesting: ACCUCHECK FAST CLICK LANCETS      If a prescription is needed, what is your preferred pharmacy and phone number: [facility] - [place], [place] - [address]  - [phone]  - [phone] FX     Additional notes: PATIENT IS RUNNING LOW ON THESE AND I DIDN'T SEE THOSE ON HER MED LIST.

## 2024-10-01 NOTE — TELEPHONE ENCOUNTER
HUB may relay:  Lancets have been sent to the pharmacy as requested    Lvm and advised return call

## 2024-10-11 DIAGNOSIS — E11.40 TYPE 2 DIABETES MELLITUS WITH DIABETIC NEUROPATHY, WITHOUT LONG-TERM CURRENT USE OF INSULIN: ICD-10-CM

## 2024-10-11 NOTE — TELEPHONE ENCOUNTER
Caller: Caryn Suellen C    Relationship: Self    Best call back number: [phone]    Requested Prescriptions:   Requested Prescriptions     Signed Prescriptions Disp Refills    Tirzepatide (MOUNJARO) 7.5 MG/0.5ML solution pen-injector pen 2 mL 0     Sig: Inject 0.5 mL under the skin into the appropriate area as directed 1 (One) Time Per Week.     Authorizing Provider: JULISSA BUTLER        Pharmacy where request should be sent: [facility] - [place], [place] - [address] [facility] [phone] [facility] [phone]      Last office visit with prescribing clinician: 9/18/2024   Last telemedicine visit with prescribing clinician: Visit date not found   Next office visit with prescribing clinician: 11/18/2024     Additional details provided by patient: NEEDS FILLED    Does the patient have less than a 3 day supply:  [x] Yes  [] No    Would you like a call back once the refill request has been completed: [] Yes [x] No    If the office needs to give you a call back, can they leave a voicemail: [] Yes [x] No    Julissa Butler MD   10/11/24 15:27 EDT

## 2024-10-18 DIAGNOSIS — E11.40 TYPE 2 DIABETES MELLITUS WITH DIABETIC NEUROPATHY, WITHOUT LONG-TERM CURRENT USE OF INSULIN: ICD-10-CM

## 2024-10-18 DIAGNOSIS — I10 HYPERTENSION, ESSENTIAL: ICD-10-CM

## 2024-10-18 RX ORDER — PIOGLITAZONEHYDROCHLORIDE 15 MG/1
15 TABLET ORAL DAILY
Qty: 30 TABLET | Refills: 3 | Status: SHIPPED | OUTPATIENT
Start: 2024-10-18

## 2024-10-18 RX ORDER — LOSARTAN POTASSIUM 100 MG/1
100 TABLET ORAL DAILY
Qty: 90 TABLET | Refills: 3 | Status: SHIPPED | OUTPATIENT
Start: 2024-10-18

## 2024-10-19 DIAGNOSIS — R11.0 NAUSEA: ICD-10-CM

## 2024-10-21 ENCOUNTER — PATIENT OUTREACH (OUTPATIENT)
Dept: CASE MANAGEMENT | Facility: OTHER | Age: 69
End: 2024-10-21
Payer: MEDICARE

## 2024-10-21 RX ORDER — ONDANSETRON 4 MG/1
4 TABLET, FILM COATED ORAL EVERY 8 HOURS PRN
Qty: 20 TABLET | Refills: 1 | Status: SHIPPED | OUTPATIENT
Start: 2024-10-21

## 2024-10-21 NOTE — OUTREACH NOTE
AMBULATORY CASE MANAGEMENT NOTE    Names and Relationships of Patient/Support Persons: Contact: Suellen Rubin; Relationship: Self -     Patient Outreach    Follow up RN-ACM outreach call made to pt. She reports to be doing well, compliant with medications, BG monitoring. Reviewed readings. Disease education provided. Updated SDOH. Pt denies any needs. She has next routine PCP appt scheduled. No questions per pt. Advised her to call with any needs. Follow up outreach scheduled for 1 month.     Send Education  Questions/Answers      Flowsheet Row Most Recent Value   Annual Wellness Visit:  Patient Has Completed   Other Patient Education/Resources  24/7 [facility] Nurse Call Line   24/7 Nurse Call Line Education Method Verbal          SDOH updated and reviewed with the patient during this program:  Financial Resource Strain: Low Risk  (10/21/2024)    Overall Financial Resource Strain (CARDIA)     Difficulty of Paying Living Expenses: Not very hard      --     Food Insecurity: No Food Insecurity (10/21/2024)    Hunger Vital Sign     Worried About Running Out of Food in the Last Year: Never true     Ran Out of Food in the Last Year: Never true      --     Transportation Needs: No Transportation Needs (10/21/2024)    PRAPARE - Transportation     Lack of Transportation (Medical): No     Lack of Transportation (Non-Medical): No       Education Documentation  Benefits, taught by Alfred Olsen RN at 10/21/2024  1:31 PM.  Learner: Patient  Readiness: Acceptance  Method: Explanation  Response: Verbalizes Understanding    Follow-Up Care, taught by Alfred Olsen RN at 10/21/2024  1:31 PM.  Learner: Patient  Readiness: Acceptance  Method: Explanation  Response: Verbalizes Understanding    Healthy Food Choices, taught by Alfred Olsen RN at 10/21/2024  1:31 PM.  Learner: Patient  Readiness: Acceptance  Method: Explanation  Response: Verbalizes Understanding    Oral Medication, taught by Alfred Olsen RN at  10/21/2024  1:31 PM.  Learner: Patient  Readiness: Acceptance  Method: Explanation  Response: Verbalizes Understanding    Insulin Therapy, taught by Alfred Olsen RN at 10/21/2024  1:31 PM.  Learner: Patient  Readiness: Acceptance  Method: Explanation  Response: Verbalizes Understanding    Blood Glucose Monitoring, taught by Alfred Olsen, RN at 10/21/2024  1:31 PM.  Learner: Patient  Readiness: Acceptance  Method: Explanation  Response: Verbalizes Understanding          Alfred GALARZA  Ambulatory Case Management    10/21/2024, 13:30 EDT

## 2024-10-28 ENCOUNTER — TELEPHONE (OUTPATIENT)
Dept: FAMILY MEDICINE CLINIC | Facility: CLINIC | Age: 69
End: 2024-10-28

## 2024-10-28 DIAGNOSIS — K43.9 HERNIA OF ABDOMINAL WALL: Primary | ICD-10-CM

## 2024-10-28 NOTE — TELEPHONE ENCOUNTER
Caller: Suellen Rubin    Relationship: Self    Best call back number:     [phone]       What is the medical concern/diagnosis: HERNIA BELOW BELLY BUTTON    What specialty or service is being requested: HERNIA SURGERY    What is the provider, practice or medical service name:     What is the office location:     What is the office phone number:     Any additional details:

## 2024-10-28 NOTE — TELEPHONE ENCOUNTER
I called and spoke with the patient to obtain more information. She states at her appt in September it wasn't bothering her but now it is causing her pain so she would like a referral to a surgeon.

## 2024-10-28 NOTE — TELEPHONE ENCOUNTER
Yes, I believe we had discussed that she had previously seen Dr. Griffiths at [facility], does she want to see him or one of the surgeons at the [facility]?

## 2024-10-31 NOTE — TELEPHONE ENCOUNTER
Caller: Suellen Rubin    Relationship: Self    Best call back number: [phone]      What was the call regarding: PATIENT CALLING WANTED TO FOLLOW UP WITH STATUS OF REFERRAL AND SEE WHERE IT STANDS     [facility] DR Griffiths OFFICE CALLED AND THEY ARE WAITING ON MEDICAL RECORDS FROM [facility] OFFICE    PLEASE GIVE CALLBACK WITH ANY DETAILS

## 2024-11-08 DIAGNOSIS — E53.8 B12 DEFICIENCY: ICD-10-CM

## 2024-11-08 DIAGNOSIS — E11.40 TYPE 2 DIABETES MELLITUS WITH DIABETIC NEUROPATHY, WITHOUT LONG-TERM CURRENT USE OF INSULIN: ICD-10-CM

## 2024-11-08 DIAGNOSIS — E78.2 MIXED HYPERLIPIDEMIA: Primary | ICD-10-CM

## 2024-11-08 DIAGNOSIS — E55.9 VITAMIN D DEFICIENCY: ICD-10-CM

## 2024-11-08 DIAGNOSIS — R79.89 ELEVATED TSH: ICD-10-CM

## 2024-11-08 DIAGNOSIS — E03.9 ACQUIRED HYPOTHYROIDISM: ICD-10-CM

## 2024-11-08 DIAGNOSIS — I10 HYPERTENSION, ESSENTIAL: ICD-10-CM

## 2024-11-08 DIAGNOSIS — R79.89 ELEVATED LFTS: ICD-10-CM

## 2024-11-08 RX ORDER — TIRZEPATIDE 7.5 MG/.5ML
INJECTION, SOLUTION SUBCUTANEOUS
Qty: 2 ML | Refills: 0 | OUTPATIENT
Start: 2024-11-08

## 2024-11-08 NOTE — TELEPHONE ENCOUNTER
Please contact patient and inform we have received a refill request for Mounjuro 7.5 mg.  Does she want to stay at this dose or increase to the next dose of 10 mg weekly.  If she is not sure could discuss at upcoming appointment on November 18.  I am putting in lab order to complete A1c, CMP, lipid panel, vitamin D level, TSH, and free T4 prior to upcoming appointment.

## 2024-11-11 NOTE — TELEPHONE ENCOUNTER
I called and spoke with the patient, she would like to discuss dosage at next appointment and she is agreeable to labs prior.

## 2024-11-12 DIAGNOSIS — E11.40 TYPE 2 DIABETES MELLITUS WITH DIABETIC NEUROPATHY, WITHOUT LONG-TERM CURRENT USE OF INSULIN: ICD-10-CM

## 2024-11-12 DIAGNOSIS — L29.9 PRURITIC CONDITION: ICD-10-CM

## 2024-11-12 RX ORDER — HYDROXYZINE HYDROCHLORIDE 25 MG/1
TABLET, FILM COATED ORAL
Qty: 90 TABLET | Refills: 3 | Status: SHIPPED | OUTPATIENT
Start: 2024-11-12

## 2024-11-12 RX ORDER — GLIMEPIRIDE 4 MG/1
4 TABLET ORAL 2 TIMES DAILY
Qty: 180 TABLET | Refills: 0 | Status: SHIPPED | OUTPATIENT
Start: 2024-11-12 | End: 2024-11-18

## 2024-11-12 NOTE — TELEPHONE ENCOUNTER
Renewing medications as requested.  Please remind her to get labs prior to upcoming appointment next week

## 2024-11-15 NOTE — PROGRESS NOTES
Subjective   The ABCs of the Annual Wellness Visit  Medicare Wellness Visit      Suellen Rubin is a 69 y.o. patient who presents for a Medicare Wellness Visit.    The following portions of the patient's history were reviewed and   updated as appropriate: allergies, current medications, past family history, past medical history, past social history, past surgical history, and problem list.    Compared to one year ago, the patient's physical   health is better.  Compared to one year ago, the patient's mental   health is the same.    Recent Hospitalizations:  She was not admitted to the hospital during the last year.     Current Medical Providers:  Patient Care Team:  Julissa Butler MD as PCP - General (Family Medicine)  Alfred Olsen RN as Ambulatory  ([facility])    Outpatient Medications Prior to Visit   Medication Sig Dispense Refill    Accu-Chek FastClix Lancets misc Use 1 Piece Daily. 102 each 3    atorvastatin (LIPITOR) 20 MG tablet Take 1 tablet by mouth Daily. 90 tablet 3    baclofen (LIORESAL) 10 MG tablet Take 1 tablet by mouth 3 (Three) Times a Day. 90 tablet 0    Diclofenac Sodium (VOLTAREN) 1 % gel gel Apply 4 g topically to the appropriate area as directed 4 (Four) Times a Day. Dose is 4 grams for knees, ankles, feet.  Dose is 2 grams for elbows, wrists, hands. 350 g 3    gabapentin (NEURONTIN) 300 MG capsule Take 2 capsules by mouth Every Night. 180 capsule 1    glucose blood (Accu-Chek Guide) test strip Use as instructed 50 each 12    [START ON 11/22/2024] HYDROcodone-acetaminophen (NORCO) 7.5-325 MG per tablet Take 1 tablet by mouth 4 (Four) Times a Day As Needed for Severe Pain. 120 tablet 0    HYDROcodone-acetaminophen (NORCO) 7.5-325 MG per tablet Take 1 tablet by mouth 4 (Four) Times a Day As Needed for Severe Pain. 120 tablet 0    HYDROcodone-acetaminophen (NORCO) 7.5-325 MG per tablet Take 1 tablet by mouth 4 (Four) Times a Day As Needed for Severe Pain.  120 tablet 0    hydrOXYzine (ATARAX) 25 MG tablet TAKE 1 TABLET BY MOUTH 3 TIMES DAILY AS NEEDED FOR ITCHING 90 tablet 3    levothyroxine (SYNTHROID, LEVOTHROID) 50 MCG tablet Take 1 tablet by mouth Daily. 90 tablet 1    loperamide (IMODIUM) 2 MG capsule Take 1 capsule by mouth 4 (Four) Times a Day As Needed for Diarrhea.      losartan (COZAAR) 100 MG tablet TAKE 1 TABLET BY MOUTH DAILY 90 tablet 3    metFORMIN (GLUCOPHAGE) 500 MG tablet TAKE 2 TABLETS BY MOUTH TWICE DAILY with meals 360 tablet 3    nystatin (MYCOSTATIN) 516395 UNIT/GM powder Apply  topically to the appropriate area as directed 3 (Three) Times a Day. 60 g 3    omeprazole (priLOSEC) 20 MG capsule Take 1 capsule by mouth Daily. 90 capsule 3    ondansetron (ZOFRAN) 4 MG tablet Take 1 tablet by mouth Every 8 (Eight) Hours As Needed for Nausea or Vomiting. 20 tablet 1    pioglitazone (ACTOS) 15 MG tablet TAKE 1 TABLET BY MOUTH EVERY DAY 30 tablet 3    polyethylene glycol (MiraLax) 17 GM/SCOOP powder Take 17 g by mouth Daily. 578 g 3    saccharomyces boulardii (Florastor) 250 MG capsule Take 1 capsule by mouth 2 (Two) Times a Day. 60 capsule 3    vitamin B-12 (CYANOCOBALAMIN) 1000 MCG tablet Take 1 tablet by mouth Daily. 90 tablet 1    SITagliptin (Januvia) 100 MG tablet Take 1 tablet by mouth Daily. 90 tablet 3    Tirzepatide (MOUNJARO) 7.5 MG/0.5ML solution pen-injector pen Inject 0.5 mL under the skin into the appropriate area as directed 1 (One) Time Per Week. 2 mL 0    glimepiride (AMARYL) 4 MG tablet TAKE 1 TABLET BY MOUTH TWICE DAILY (Patient not taking: Reported on 11/18/2024) 180 tablet 0     No facility-administered medications prior to visit.     Opioid medication/s are on active medication list.  and I have evaluated her active treatment plan and pain score trends (see table).  Vitals:    11/18/24 1010   PainSc: 0-No pain     I have reviewed the chart for potential of high risk medication and harmful drug interactions in the  "elderly.        Aspirin is not on active medication list.  Aspirin use is not indicated based on review of current medical condition/s. Risk of harm outweighs potential benefits.  .    Patient Active Problem List   Diagnosis    Encounter to establish care    Type 2 diabetes mellitus with diabetic neuropathy    Hypertension, essential    Mixed hyperlipidemia    Parotid mass    Class 2 severe obesity due to excess calories with serious comorbidity and body mass index (BMI) of 38.0 to 38.9 in adult    Elevated LFTs    Shingles    Nail breaking    Pruritic condition    Former smoker    DDD (degenerative disc disease), lumbar    Acquired hypothyroidism    Mid back pain, chronic    Medicare annual wellness visit, subsequent    Trigger thumb of left hand    Gastroesophageal reflux disease without esophagitis     Advance Care Planning Advance Directive is not on file.  ACP discussion was held with the patient during this visit. Patient does not have an advance directive, information provided.            Objective   Vitals:    11/18/24 1010 11/18/24 1019   BP: 142/90 136/88   BP Location: Right arm Right arm   Patient Position: Sitting Sitting   Cuff Size: Large Adult Large Adult   Pulse: 100    Resp: 18    Temp: 96.2 °F (35.7 °C)    TempSrc: Temporal    SpO2: 94%    Weight: 93.1 kg (205 lb 3.2 oz)    Height: 158.8 cm (62.5\")    PainSc: 0-No pain        Estimated body mass index is 36.93 kg/m² as calculated from the following:    Height as of this encounter: 158.8 cm (62.5\").    Weight as of this encounter: 93.1 kg (205 lb 3.2 oz).          ATTENTION  What is the year: correct  What is the month of the year: correct  What is the day of the week?: correct  What is the date?: correct  MEMORY  Repeat address three times, only score third attempt: Donnell Carballo [address] [place] [address]: 7  HOW MANY ANIMALS DID THE PATIENT NAME  Verbal Fluency -- Animal Names (0-25): 17-21  CLOCK DRAWING  Clock Drawing: All " Correct  MEMORY RECALL  Tell me what you remember about that name and address we were repeating at the beginnin  ACE TOTAL SCORE  Total ACE Score - <25/30 strongly suggests cognitive impairment; <21/30 almost certainly shows dementia: 27    Does the patient have evidence of cognitive impairment? No  Lab Results   Component Value Date    CHLPL 126 2024    TRIG 165 (H) 2024    HDL 29 (L) 2024    LDL 69 2024    VLDL 28 2024    HGBA1C 6.9 (H) 2024                                                                                                Health  Risk Assessment    Smoking Status:  Social History     Tobacco Use   Smoking Status Former    Current packs/day: 0.00    Average packs/day: 2.0 packs/day for 34.0 years (68.0 ttl pk-yrs)    Types: Cigarettes    Start date:     Quit date:     Years since quittin.8   Smokeless Tobacco Never     Alcohol Consumption:  Social History     Substance and Sexual Activity   Alcohol Use Not Currently       Fall Risk Screen  STEADI Fall Risk Assessment was completed, and patient is at LOW risk for falls.Assessment completed on:2024    Depression Screening   Little interest or pleasure in doing things? Not at all   Feeling down, depressed, or hopeless? Not at all   PHQ-2 Total Score 0      Health Habits and Functional and Cognitive Screenin/18/2024    10:11 AM   Functional & Cognitive Status   Do you have difficulty preparing food and eating? No   Do you have difficulty bathing yourself, getting dressed or grooming yourself? No   Do you have difficulty using the toilet? No   Do you have difficulty moving around from place to place? No   Do you have trouble with steps or getting out of a bed or a chair? No   Current Diet Well Balanced Diet   Dental Exam Not up to date   Eye Exam Up to date   Exercise (times per week) 0 times per week   Current Exercises Include No Regular Exercise   Do you need help using the phone?  No    Are you deaf or do you have serious difficulty hearing?  No   Do you need help to go to places out of walking distance? No   Do you need help shopping? No   Do you need help preparing meals?  No   Do you need help with housework?  No   Do you need help with laundry? No   Do you need help taking your medications? No   Do you need help managing money? No   Do you ever drive or ride in a car without wearing a seat belt? No   Have you felt unusual stress, anger or loneliness in the last month? No   Who do you live with? Alone   If you need help, do you have trouble finding someone available to you? No   Have you been bothered in the last four weeks by sexual problems? No   Do you have difficulty concentrating, remembering or making decisions? No           Age-appropriate Screening Schedule:  Refer to the list below for future screening recommendations based on patient's age, sex and/or medical conditions. Orders for these recommended tests are listed in the plan section. The patient has been provided with a written plan.    Health Maintenance List  Health Maintenance   Topic Date Due    Pneumococcal Vaccine 65+ (1 of 2 - PCV) Never done    TDAP/TD VACCINES (1 - Tdap) Never done    ZOSTER VACCINE (1 of 2) Never done    LUNG CANCER SCREENING  07/06/2024    INFLUENZA VACCINE  03/31/2025 (Originally 8/1/2024)    BMI FOLLOWUP  03/08/2025    HEMOGLOBIN A1C  05/13/2025    DIABETIC EYE EXAM  07/01/2025    DIABETIC FOOT EXAM  08/12/2025    LIPID PANEL  11/13/2025    ANNUAL WELLNESS VISIT  11/18/2025    MAMMOGRAM  03/18/2026    DXA SCAN  03/18/2026    COLORECTAL CANCER SCREENING  01/24/2027    HEPATITIS C SCREENING  Completed    COVID-19 Vaccine  Discontinued    URINE MICROALBUMIN  Discontinued                                                                                                                                                CMS Preventative Services Quick Reference  Risk Factors Identified During Encounter  See  assessment plan    The above risks/problems have been discussed with the patient.  Pertinent information has been shared with the patient in the After Visit Summary.  An After Visit Summary and PPPS were made available to the patient.    Follow Up:   Next Medicare Wellness visit to be scheduled in 1 year.         Additional E&M Note during same encounter follows:  Patient has additional, significant, and separately identifiable condition(s)/problem(s) that require work above and beyond the Medicare Wellness Visit     Chief Complaint  Medicare Wellness-subsequent, Diabetes, Hypertension, Hyperlipidemia, Hypothyroidism, and Vitamin D Deficiency    Subjective    HPI  Suellen is also being seen today for additional medical problem/s.    Diabetes  Patient does check blood sugar at home 1 times daily.  Per patient fasting blood sugars range between 99 to 180.  Associated symptoms include None  Per patient current diet is Well Balanced.  Patient does not avoid concentrated sweets.  Patient does not see podiatry.  Patient see's [facility] for diabetic eye exam and last eye exam was less than 6 months ago.  Patient reports they are taking medications as prescribed and they are not having side effects.  Last A1c was 6.9 on 11/13/24.     Hypertension  Patient does not check blood pressure at home.   Patient denies  blurred vision, chest pain, dyspnea, headache, neck aches, orthopnea, palpitations, paroxysmal nocturnal dyspnea, peripheral edema, pulsating in the ears, and tiredness/fatigue   Patient reports they are taking medications as prescribed and they are not having side effects.    Hyperlipidemia  Patient is following a low cholesterol diet.   Currently is on statin therapy.  Patient reports is not exercising.  Patient reports they are taking medications as prescribed and they are not having side effects.    Hypothyroidism  Patient presents for evaluation of thyroid function.   Symptoms consist of denies fatigue, weight  "changes, heat/cold intolerance, bowel/skin changes or CVS symptoms.  The problem has been stable.    Patient reports they are taking medications as prescribed and they are not having side effects.    Vitamin D Deficiency   Symptoms include  none  Patient reports is not taking vitamin d supplement.    Influenza immunization was not given due to patient refusal.     Vitals:    11/18/24 1010 11/18/24 1019   BP: 142/90 136/88   BP Location: Right arm Right arm   Patient Position: Sitting Sitting   Cuff Size: Large Adult Large Adult   Pulse: 100    Resp: 18    Temp: 96.2 °F (35.7 °C)    TempSrc: Temporal    SpO2: 94%    Weight: 93.1 kg (205 lb 3.2 oz)    Height: 158.8 cm (62.5\")            The patient is a 69-year-old female who presents for Medicare wellness as well as follow-up on diabetes as well as other chronic medical conditions including hypothyroidism, hyperlipidemia, hypertension, and vitamin D deficiency. She did have labs on 11/13/2024 and we will review those today.    She initiated Mounjaro treatment three months ago, with an increase from 2.5 mg to 5 mg two months ago.    She has scheduled an appointment with Dr. Rubin Griffiths, a surgeon, to discuss her hernia.          Objective   Vital Signs:  /88 (BP Location: Right arm, Patient Position: Sitting, Cuff Size: Large Adult)   Pulse 100   Temp 96.2 °F (35.7 °C) (Temporal)   Resp 18   Ht 158.8 cm (62.5\")   Wt 93.1 kg (205 lb 3.2 oz)   SpO2 94%   BMI 36.93 kg/m²   Physical Exam                   Assessment and Plan      Diagnoses and all orders for this visit:    1. Medicare annual wellness visit, subsequent (Primary)    2. Type 2 diabetes mellitus with diabetic neuropathy, without long-term current use of insulin  -     Tirzepatide 10 MG/0.5ML solution auto-injector; Inject 10 mg under the skin into the appropriate area as directed 1 (One) Time Per Week.  Dispense: 2 mL; Refill: 0    3. Hypertension, essential    4. Mixed hyperlipidemia    5. " Acquired hypothyroidism    6. Vitamin D deficiency    7. Body mass index (BMI) of 36.0 to 36.9 in adult    8. Former smoker           1. Diabetes Mellitus.  Her A1c has improved from 8.6 down to 6.9. She has lost 8 pounds since August, indicating progress in weight management. She was advised to reduce portion sizes, avoid deep-fried foods, and consume more vegetables, fruits, whole grains, lean meats, and lean dairy. She has self discontinue glimepiride as her blood sugar levels are well-controlled, advised not to restart it. A prescription for Mounjaro 10 mg weekly was provided, with instructions to dispense in a vial along with appropriate syringes, not the autoinjector, at patient's request believing this would be less expensive. She was advised to discontinue Mounjaro at least a week prior to surgery and to consult with Dr. Griffiths regarding the timing of last dose prior to surgery. She was also advised to continue taking metformin and pioglitazone. If her A1c remains below 7, consideration will be given to discontinuing pioglitazone and reducing metformin.    2. Hernia.  She has an appointment for an MRI tomorrow to evaluate the hernia and determine if surgical repair is necessary. She was advised to discuss the timing of surgery and medication adjustments with Dr. Griffiths    3. Health Maintenance.  The patient was counseled regarding nutrition, physical activity, healthy weight, injury prevention, immunizations and preventative health screenings.    She was encouraged to receive the shingles, tetanus, and pneumonia ( Prevnar) vaccines, but declined the influenza and COVID-19 vaccines.  A CT scan of the chest was recommended for lung cancer screening, but she declined. She was advised to report any unexplained cough or coughing up blood immediately.    4. Nausea.  She was advised to take Zofran as needed for nausea.    5. Advanced Care Planning.  She was provided with a copy of her living will and instructed  to keep it in a safe place and bring it to hospitals. She discussed and signed the living will and physician's scope of treatment forms.    Follow-up  Return in 3 months for follow up.    Discussion with Patientregarding advanced directives and end of life care was voluntarily entered by patient.  Patient has not had significant change in their medical condition in the past year.     Living Will, POST, and Advanced Care Planning form discussed at length and reviewed with patient.     I spent 18 minutes  with patient reviewing information and documenting  in the chart.      Patient states she does want CPR. Reviewed medical interventions with patient and the differences between each: Comfort, Limited and Full. Patient opted for Full. Discussed the use of antibiotics at the end of life. She chose to use antibiotics consistent with treatment goals. Discussed artificially administered nutrition, patient is aware that if she is alert and oriented they can change their mind at any time. However, they have elected to have a trial of artificial nutrition by tube for undetermined length of time for a goal of good quality of life..   Additionally, she would want withdrawal of advanced care interventions including mechanical ventilation and artificial nutrition if there is little or no chance of recovery to good quality of life.    Patient has identified her children  as her healthcare representative. Advised to discuss with healthcare representative if they can comply with wishes. Patient encouraged to have a meeting to discuss his decision regarding advanced care directives and goals of care with extended family and significant  friends  In regard to the POST form:The patient opted to complete POST while in the office and copy scanned into the chart. and advised to give to family members, place in an easily accessible place and take with them if going to the hospital or Emergency room.     No orders of the defined types were  placed in this encounter.    New Medications Ordered This Visit   Medications    Tirzepatide 10 MG/0.5ML solution auto-injector     Sig: Inject 10 mg under the skin into the appropriate area as directed 1 (One) Time Per Week.     Dispense:  2 mL     Refill:  0     Please dispense in vial if available along with appropriate syringes - Not the auto injector.  Believes will be less expensive.          Follow Up   Return in about 3 months (around 2/18/2025) for diabetes.  Patient was given instructions and counseling regarding her condition or for health maintenance advice. Please see specific information pulled into the AVS if appropriate.  Patient or patient representative verbalized consent for the use of Ambient Listening during the visit with  Julissa Butler MD for chart documentation. 11/18/2024  11:00 EST

## 2024-11-18 ENCOUNTER — OFFICE VISIT (OUTPATIENT)
Dept: FAMILY MEDICINE CLINIC | Facility: CLINIC | Age: 69
End: 2024-11-18
Payer: MEDICARE

## 2024-11-18 VITALS
OXYGEN SATURATION: 94 % | WEIGHT: 205.2 LBS | HEIGHT: 63 IN | HEART RATE: 100 BPM | TEMPERATURE: 96.2 F | DIASTOLIC BLOOD PRESSURE: 88 MMHG | RESPIRATION RATE: 18 BRPM | BODY MASS INDEX: 36.36 KG/M2 | SYSTOLIC BLOOD PRESSURE: 136 MMHG

## 2024-11-18 DIAGNOSIS — Z00.00 MEDICARE ANNUAL WELLNESS VISIT, SUBSEQUENT: Primary | ICD-10-CM

## 2024-11-18 DIAGNOSIS — I10 HYPERTENSION, ESSENTIAL: ICD-10-CM

## 2024-11-18 DIAGNOSIS — E78.2 MIXED HYPERLIPIDEMIA: ICD-10-CM

## 2024-11-18 DIAGNOSIS — Z87.891 FORMER SMOKER: ICD-10-CM

## 2024-11-18 DIAGNOSIS — E11.40 TYPE 2 DIABETES MELLITUS WITH DIABETIC NEUROPATHY, WITHOUT LONG-TERM CURRENT USE OF INSULIN: ICD-10-CM

## 2024-11-18 DIAGNOSIS — E55.9 VITAMIN D DEFICIENCY: ICD-10-CM

## 2024-11-18 DIAGNOSIS — E03.9 ACQUIRED HYPOTHYROIDISM: ICD-10-CM

## 2024-11-18 PROCEDURE — 3044F HG A1C LEVEL LT 7.0%: CPT | Performed by: FAMILY MEDICINE

## 2024-11-18 PROCEDURE — G0439 PPPS, SUBSEQ VISIT: HCPCS | Performed by: FAMILY MEDICINE

## 2024-11-18 PROCEDURE — 99214 OFFICE O/P EST MOD 30 MIN: CPT | Performed by: FAMILY MEDICINE

## 2024-11-18 PROCEDURE — 1170F FXNL STATUS ASSESSED: CPT | Performed by: FAMILY MEDICINE

## 2024-11-18 PROCEDURE — 3079F DIAST BP 80-89 MM HG: CPT | Performed by: FAMILY MEDICINE

## 2024-11-18 PROCEDURE — 1126F AMNT PAIN NOTED NONE PRSNT: CPT | Performed by: FAMILY MEDICINE

## 2024-11-18 PROCEDURE — 99497 ADVNCD CARE PLAN 30 MIN: CPT | Performed by: FAMILY MEDICINE

## 2024-11-18 PROCEDURE — 3075F SYST BP GE 130 - 139MM HG: CPT | Performed by: FAMILY MEDICINE

## 2024-11-26 ENCOUNTER — PATIENT OUTREACH (OUTPATIENT)
Dept: CASE MANAGEMENT | Facility: OTHER | Age: 69
End: 2024-11-26
Payer: MEDICARE

## 2024-11-26 NOTE — OUTREACH NOTE
AMBULATORY CASE MANAGEMENT NOTE    Names and Relationships of Patient/Support Persons: Contact: Suellen Rubin; Relationship: Self -     Patient Outreach    Follow up RN-ACM outreach call made to pt. She reports to be doing well, compliant with medications, BG monitoring. A1C has improved. Disease education provided. Updated SDOH. Pt denies any needs. She has next routine PCP appt scheduled. No questions per pt. Pt exhibits strong sense of health self-management and adequate support system. Advised her to call with any needs. Follow up outreach prn.     Send Education  Questions/Answers      Flowsheet Row Most Recent Value   Annual Wellness Visit:  Patient Has Completed   Other Patient Education/Resources  24/7 [facility] Nurse Call Line   24/7 Nurse Call Line Education Method Verbal            Education Documentation  Follow-Up Care, taught by Alfred Olsen, RN at 11/26/2024 10:39 AM.  Learner: Patient  Readiness: Acceptance  Method: Explanation  Response: Verbalizes Understanding    Healthy Food Choices, taught by Alfred Olsen, RN at 11/26/2024 10:39 AM.  Learner: Patient  Readiness: Acceptance  Method: Explanation  Response: Verbalizes Understanding    Oral Medication, taught by Alfred Olsen, RN at 11/26/2024 10:39 AM.  Learner: Patient  Readiness: Acceptance  Method: Explanation  Response: Verbalizes Understanding    Insulin Therapy, taught by Alfred Olsen, RN at 11/26/2024 10:39 AM.  Learner: Patient  Readiness: Acceptance  Method: Explanation  Response: Verbalizes Understanding    Blood Glucose Monitoring, taught by Alfred Olsen, RN at 11/26/2024 10:39 AM.  Learner: Patient  Readiness: Acceptance  Method: Explanation  Response: Verbalizes Understanding          Alfred GALARZA  Ambulatory Case Management    11/26/2024, 10:39 EST

## 2024-11-27 ENCOUNTER — TELEPHONE (OUTPATIENT)
Dept: FAMILY MEDICINE CLINIC | Facility: CLINIC | Age: 69
End: 2024-11-27
Payer: MEDICARE

## 2024-11-27 DIAGNOSIS — K43.9 HERNIA OF ABDOMINAL WALL: Primary | ICD-10-CM

## 2024-11-27 NOTE — TELEPHONE ENCOUNTER
"Received call from surgeon Dr. Griffiths regarding hernia.  States he did a CT of abdomen in preparation of anticipated hernia repair and the CT shows \"loss of domain\" (loss of muscle mass in the abdominal wall around the area of the hernia) which would make repair more complicated and recommended referral to [facility].  Please contact patient and ask if she would like us to place this referral at this time.  "

## 2024-12-02 NOTE — TELEPHONE ENCOUNTER
I called and spoke with the patient, she voiced understanding and is agreeable to a referral to [facility].

## 2024-12-02 NOTE — ADDENDUM NOTE
Addended by: MIRANDA CARNEY on: 12/2/2024 01:00 PM     Modules accepted: Orders     Stable . Effexor 37.5mg daily.

## 2024-12-03 RX ORDER — NYSTATIN 100000 [USP'U]/G
POWDER TOPICAL 3 TIMES DAILY
Qty: 60 G | Refills: 3 | Status: SHIPPED | OUTPATIENT
Start: 2024-12-03

## 2024-12-03 NOTE — TELEPHONE ENCOUNTER
Caller: Suellen Rubin    Relationship: Self    Best call back number: [phone]     Requested Prescriptions:   Requested Prescriptions     Pending Prescriptions Disp Refills    nystatin (MYCOSTATIN) 836295 UNIT/GM powder 60 g 3     Sig: Apply  topically to the appropriate area as directed 3 (Three) Times a Day.        Pharmacy where request should be sent: [facility] - [email or web address], [place] - [address] [facility] [phone] [facility] [phone]      Last office visit with prescribing clinician: 11/18/2024   Last telemedicine visit with prescribing clinician: Visit date not found   Next office visit with prescribing clinician: 2/19/2025     Does the patient have less than a 3 day supply:  [x] Yes  [] No    Desiree Rey Rep   12/03/24 10:07 EST

## 2024-12-17 ENCOUNTER — TELEPHONE (OUTPATIENT)
Dept: FAMILY MEDICINE CLINIC | Facility: CLINIC | Age: 69
End: 2024-12-17
Payer: MEDICARE

## 2024-12-17 ENCOUNTER — TELEPHONE (OUTPATIENT)
Dept: FAMILY MEDICINE CLINIC | Facility: CLINIC | Age: 69
End: 2024-12-17

## 2024-12-17 DIAGNOSIS — E11.40 TYPE 2 DIABETES MELLITUS WITH DIABETIC NEUROPATHY, WITHOUT LONG-TERM CURRENT USE OF INSULIN: Primary | ICD-10-CM

## 2024-12-17 NOTE — TELEPHONE ENCOUNTER
FAX NUMBER [phone]      Caller: Suellen Rubin    Relationship: Self    Best call back number:     Suellen Rubin (Self) [phone] (Mobile)       What was the call regarding: PATIENT CALLED [facility] AND THEY STATED THAT THEY DON'T HAVE THE REFERRAL FOR GENERAL SURGERY     CAN YOU RESEND THAT FOR HER ?    Is it okay if the provider responds through MyChart:

## 2024-12-17 NOTE — TELEPHONE ENCOUNTER
I called and spoke with the patient, she would like to increase and denies any symptoms at all. She states her glucose readings have been 100 or less.

## 2024-12-17 NOTE — TELEPHONE ENCOUNTER
Caller: Suellen Rubin    Relationship: Self    Best call back number: [phone]    What medication are you requesting:     Tirzepatide       Have you had these symptoms before:    [x] Yes  [] No    Have you been treated for these symptoms before:   [x] Yes  [] No    If a prescription is needed, what is your preferred pharmacy and phone number: [facility] - [place], [place] - [address]  - [phone] [facility] [phone] FX     Additional notes:    STATED SHE IS AT 10.5 AND IS HANDLING THAT DOSE WELL, WOULD LIKE TO HAVE THIS CALLED IN, EITHER THE SAME DOSE OR AN INCREASED ONE.    SHOT IS DUE FRIDAY 12/20/24. IS OUT OF MEDICATION AT THIS TIME

## 2024-12-23 ENCOUNTER — TELEPHONE (OUTPATIENT)
Dept: PAIN MEDICINE | Facility: CLINIC | Age: 69
End: 2024-12-23
Payer: MEDICARE

## 2024-12-23 DIAGNOSIS — M54.50 ACUTE MIDLINE LOW BACK PAIN WITHOUT SCIATICA: ICD-10-CM

## 2024-12-23 DIAGNOSIS — M51.369 DDD (DEGENERATIVE DISC DISEASE), LUMBAR: ICD-10-CM

## 2024-12-23 DIAGNOSIS — F11.20 NARCOTIC DEPENDENCY, CONTINUOUS: ICD-10-CM

## 2024-12-23 RX ORDER — HYDROCODONE BITARTRATE AND ACETAMINOPHEN 7.5; 325 MG/1; MG/1
1 TABLET ORAL 4 TIMES DAILY PRN
Qty: 120 TABLET | Refills: 0 | Status: SHIPPED | OUTPATIENT
Start: 2024-12-23 | End: 2024-12-30 | Stop reason: SDUPTHER

## 2024-12-23 NOTE — TELEPHONE ENCOUNTER
Caller: Suellen Rubin    Relationship: Self    Best call back number:     Requested Prescriptions:   HYDROcodone-acetaminophen (NORCO) 7.5-325 MG per tablet     Pharmacy where request should be sent:    [facility] - [place], [place] - [address] - [phone] PH - [phone] FX  [address], [place] [address]  Phone: [phone]  Fax: [phone]       Last office visit with prescribing clinician: 9/20/2024   Last telemedicine visit with prescribing clinician: Visit date not found   Next office visit with prescribing clinician: Visit date not found     Additional details provided by patient: PATIENT HAS LESS THAN 3 DAY SUPPLY     Does the patient have less than a 3 day supply:  [x] Yes  [] No    Would you like a call back once the refill request has been completed: [] Yes [] No    If the office needs to give you a call back, can they leave a voicemail: [] Yes [] No    Desiree Owens Rep   12/23/24 13:40 EST

## 2024-12-30 ENCOUNTER — OFFICE VISIT (OUTPATIENT)
Dept: PAIN MEDICINE | Facility: CLINIC | Age: 69
End: 2024-12-30
Payer: MEDICARE

## 2024-12-30 VITALS
OXYGEN SATURATION: 96 % | WEIGHT: 191 LBS | HEART RATE: 92 BPM | RESPIRATION RATE: 16 BRPM | BODY MASS INDEX: 34.38 KG/M2 | SYSTOLIC BLOOD PRESSURE: 135 MMHG | DIASTOLIC BLOOD PRESSURE: 75 MMHG

## 2024-12-30 DIAGNOSIS — M54.50 ACUTE MIDLINE LOW BACK PAIN WITHOUT SCIATICA: ICD-10-CM

## 2024-12-30 DIAGNOSIS — M51.369 DDD (DEGENERATIVE DISC DISEASE), LUMBAR: ICD-10-CM

## 2024-12-30 DIAGNOSIS — F11.20 NARCOTIC DEPENDENCY, CONTINUOUS: ICD-10-CM

## 2024-12-30 RX ORDER — HYDROCODONE BITARTRATE AND ACETAMINOPHEN 7.5; 325 MG/1; MG/1
1 TABLET ORAL 4 TIMES DAILY PRN
Qty: 120 TABLET | Refills: 0 | Status: SHIPPED | OUTPATIENT
Start: 2025-03-20

## 2024-12-30 RX ORDER — HYDROCODONE BITARTRATE AND ACETAMINOPHEN 7.5; 325 MG/1; MG/1
1 TABLET ORAL 4 TIMES DAILY PRN
Qty: 120 TABLET | Refills: 0 | Status: SHIPPED | OUTPATIENT
Start: 2025-01-22

## 2024-12-30 RX ORDER — HYDROCODONE BITARTRATE AND ACETAMINOPHEN 7.5; 325 MG/1; MG/1
1 TABLET ORAL 4 TIMES DAILY PRN
Qty: 120 TABLET | Refills: 0 | Status: SHIPPED | OUTPATIENT
Start: 2025-02-21

## 2024-12-30 RX ORDER — GLIMEPIRIDE 4 MG/1
1 TABLET ORAL EVERY 12 HOURS SCHEDULED
COMMUNITY
Start: 2024-12-16

## 2024-12-30 NOTE — PROGRESS NOTES
CHIEF COMPLAINT  Chief Complaint   Patient presents with    Back Pain     [place] LD 12/30@0700       Primary Care  Julissa Butler MD    Subjective   Suellen Rubin is a 69 y.o. female  who presents for chronic axial low back pain.  She reports that she has had longstanding low back pain.  She reports that she is previously treated this pain with a combination of NSAIDs as well as narcotics.  She states that she was previously taking NSAIDs however her primary care stopped her NSAIDs and switch from narcotics.  It appears that she has been progressively escalating her narcotic dosage over the past year.  She is currently taking approximately 3 hydrocodone's daily.  She reports primarily axial back pain without any significant radicular pain.  She reports the pain is worse with any type of movement physical activity and improves with rest and medication.  She denies any red flag symptoms such as loss of bowel or bladder or any saddle anesthesia.  She does have plain films which do show facet arthropathy especially in the lower lumbar segments    Pain  Associated symptoms include arthralgias and myalgias. Pertinent negatives include no joint swelling, neck pain, numbness or weakness.   Back Pain  Pertinent negatives include no numbness or weakness.        Location: Axial low back  Onset: Years ago  Duration: Slowly worsening  Timing: Constant throughout the day  Quality: Sharp, stabbing  Severity: Today: 6       Last Week: 6       Worst: 7  Modifying Factors: The pain is worse with movement and physical activity and improves with medication rest  Functional Deficit: The pain makes it difficult for her to perform her activities of daily living    Physical Therapy: no    Interval Update 12/30/2024: Continues with Norco 7.5 mg 4 times daily    The following portions of the patient's history were reviewed and updated as appropriate: allergies, current medications, past family history, past medical history,  past social history, past surgical history and problem list.    Procedures:  7/6/2023: Bilateral L4-5, L5-S1 LMBB diagnostic: 90% benefit for 3 days  12/4/2023: Bilateral L4-5, 5 S1 RFA: Unclear benefit      Current Outpatient Medications:     Accu-Chek FastClix Lancets misc, Use 1 Piece Daily., Disp: 102 each, Rfl: 3    atorvastatin (LIPITOR) 20 MG tablet, Take 1 tablet by mouth Daily., Disp: 90 tablet, Rfl: 3    baclofen (LIORESAL) 10 MG tablet, Take 1 tablet by mouth 3 (Three) Times a Day., Disp: 90 tablet, Rfl: 0    Diclofenac Sodium (VOLTAREN) 1 % gel gel, Apply 4 g topically to the appropriate area as directed 4 (Four) Times a Day. Dose is 4 grams for knees, ankles, feet.  Dose is 2 grams for elbows, wrists, hands., Disp: 350 g, Rfl: 3    gabapentin (NEURONTIN) 300 MG capsule, Take 2 capsules by mouth Every Night., Disp: 180 capsule, Rfl: 1    glimepiride (AMARYL) 4 MG tablet, Take 1 tablet by mouth Every 12 (Twelve) Hours., Disp: , Rfl:     glucose blood (Accu-Chek Guide) test strip, Use as instructed, Disp: 50 each, Rfl: 12    [START ON 1/22/2025] HYDROcodone-acetaminophen (NORCO) 7.5-325 MG per tablet, Take 1 tablet by mouth 4 (Four) Times a Day As Needed for Severe Pain., Disp: 120 tablet, Rfl: 0    [START ON 2/21/2025] HYDROcodone-acetaminophen (NORCO) 7.5-325 MG per tablet, Take 1 tablet by mouth 4 (Four) Times a Day As Needed for Severe Pain., Disp: 120 tablet, Rfl: 0    [START ON 3/20/2025] HYDROcodone-acetaminophen (NORCO) 7.5-325 MG per tablet, Take 1 tablet by mouth 4 (Four) Times a Day As Needed for Severe Pain., Disp: 120 tablet, Rfl: 0    hydrOXYzine (ATARAX) 25 MG tablet, TAKE 1 TABLET BY MOUTH 3 TIMES DAILY AS NEEDED FOR ITCHING, Disp: 90 tablet, Rfl: 3    levothyroxine (SYNTHROID, LEVOTHROID) 50 MCG tablet, Take 1 tablet by mouth Daily., Disp: 90 tablet, Rfl: 1    loperamide (IMODIUM) 2 MG capsule, Take 1 capsule by mouth 4 (Four) Times a Day As Needed for Diarrhea., Disp: , Rfl:     losartan  (COZAAR) 100 MG tablet, TAKE 1 TABLET BY MOUTH DAILY, Disp: 90 tablet, Rfl: 3    metFORMIN (GLUCOPHAGE) 500 MG tablet, TAKE 2 TABLETS BY MOUTH TWICE DAILY with meals, Disp: 360 tablet, Rfl: 3    nystatin (MYCOSTATIN) [id number] UNIT/GM powder, Apply  topically to the appropriate area as directed 3 (Three) Times a Day., Disp: 60 g, Rfl: 3    omeprazole (priLOSEC) 20 MG capsule, Take 1 capsule by mouth Daily., Disp: 90 capsule, Rfl: 3    ondansetron (ZOFRAN) 4 MG tablet, Take 1 tablet by mouth Every 8 (Eight) Hours As Needed for Nausea or Vomiting., Disp: 20 tablet, Rfl: 1    pioglitazone (ACTOS) 15 MG tablet, TAKE 1 TABLET BY MOUTH EVERY DAY, Disp: 30 tablet, Rfl: 3    polyethylene glycol (MiraLax) 17 GM/SCOOP powder, Take 17 g by mouth Daily., Disp: 578 g, Rfl: 3    saccharomyces boulardii (Florastor) 250 MG capsule, Take 1 capsule by mouth 2 (Two) Times a Day., Disp: 60 capsule, Rfl: 3    Tirzepatide 12.5 MG/0.5ML solution auto-injector, Inject 12.5 mg under the skin into the appropriate area as directed 1 (One) Time Per Week., Disp: 2 mL, Rfl: 0    vitamin B-12 (CYANOCOBALAMIN) 1000 MCG tablet, Take 1 tablet by mouth Daily., Disp: 90 tablet, Rfl: 1    Review of Systems   Musculoskeletal:  Positive for arthralgias, back pain, gait problem and myalgias. Negative for joint swelling, neck pain and neck stiffness.   Neurological:  Negative for weakness and numbness.       Vitals:    12/30/24 1019   BP: 135/75   Pulse: 92   Resp: 16   SpO2: 96%   Weight: 86.6 kg (191 lb)   PainSc:   4       Urine Drug Screen: 3/22/2024  Appropriate: Yes    Objective   Physical Exam  Vitals and nursing note reviewed.   Constitutional:       General: She is not in acute distress.     Appearance: Normal appearance. She is well-developed and normal weight.   Neck:      Trachea: No tracheal deviation.   Musculoskeletal:      Comments: Lumbar Spine Exam:  Tender to palpation over the lumbar paraspinal musculature Yes  Limited range of motion  secondary to pain Yes  Facet loading positive: bilateral  Facets tender to palpation: bilateral  Straight leg raise test positive: Negative       Neurological:      General: No focal deficit present.      Mental Status: She is alert.      Sensory: No sensory deficit.      Motor: No weakness.           Assessment & Plan   Problems Addressed this Visit       DDD (degenerative disc disease), lumbar    Relevant Medications    HYDROcodone-acetaminophen (NORCO) 7.5-325 MG per tablet (Start on 1/22/2025)    HYDROcodone-acetaminophen (NORCO) 7.5-325 MG per tablet (Start on 2/21/2025)    HYDROcodone-acetaminophen (NORCO) 7.5-325 MG per tablet (Start on 3/20/2025)     Other Visit Diagnoses       Acute midline low back pain without sciatica        Relevant Medications    HYDROcodone-acetaminophen (NORCO) 7.5-325 MG per tablet (Start on 1/22/2025)    HYDROcodone-acetaminophen (NORCO) 7.5-325 MG per tablet (Start on 2/21/2025)    HYDROcodone-acetaminophen (NORCO) 7.5-325 MG per tablet (Start on 3/20/2025)    Narcotic dependency, continuous        Relevant Medications    HYDROcodone-acetaminophen (NORCO) 7.5-325 MG per tablet (Start on 1/22/2025)    HYDROcodone-acetaminophen (NORCO) 7.5-325 MG per tablet (Start on 2/21/2025)    HYDROcodone-acetaminophen (NORCO) 7.5-325 MG per tablet (Start on 3/20/2025)          Diagnoses         Codes Comments    DDD (degenerative disc disease), lumbar     ICD-10-CM: M51.369  ICD-9-CM: 722.52     Acute midline low back pain without sciatica     ICD-10-CM: M54.50  ICD-9-CM: 724.2     Narcotic dependency, continuous     ICD-10-CM: F11.20  ICD-9-CM: 304.91             Plan:  Continue hydrocodone 4 times daily  Unclear benefit from RFA  UDS inspect appropriate  If her pain worsens, we can obtain new imaging  --- Follow-up 3 months           INSPECT REPORT    As part of the patient's treatment plan, I may be prescribing controlled substances. The patient has been made aware of appropriate use of such  medications, including potential risk of somnolence, limited ability to drive and/or work safely, and the potential for dependence or overdose. It has also bee made clear that these medications are for use by this patient only, without concomitant use of alcohol or other substances unless prescribed.     Patient has completed prescribing agreement detailing terms of continued prescribing of controlled substances, including monitoring ROBYN reports, urine drug screening, and pill counts if necessary. The patient is aware that inappropriate use will results in cessation of prescribing such medications.    INSPECT report has been reviewed and scanned into the patient's chart.    As the clinician, I personally reviewed the INSPECT from 12/27/2024.    History and physical exam exhibit continued safe and appropriate use of controlled substances.      EMR Dragon/Transcription disclaimer:   Much of this encounter note is an electronic transcription/translation of spoken language to printed text. The electronic translation of spoken language may permit erroneous, or at times, nonsensical words or phrases to be inadvertently transcribed; Although I have reviewed the note for such errors, some may still exist.

## 2025-01-09 ENCOUNTER — TELEPHONE (OUTPATIENT)
Dept: FAMILY MEDICINE CLINIC | Facility: CLINIC | Age: 70
End: 2025-01-09
Payer: MEDICARE

## 2025-01-09 NOTE — TELEPHONE ENCOUNTER
lEva,  at General [facility] called stating they received a referral for patient to see . However, they show patient has already been seen by Dr. Griffiths &  noted that he spoke to  recommending patient be referred to a hernia specialist. Hernia is too large for  to do the surgery.

## 2025-01-10 DIAGNOSIS — M51.369 DDD (DEGENERATIVE DISC DISEASE), LUMBAR: ICD-10-CM

## 2025-01-10 DIAGNOSIS — E11.40 TYPE 2 DIABETES MELLITUS WITH DIABETIC NEUROPATHY, WITHOUT LONG-TERM CURRENT USE OF INSULIN: ICD-10-CM

## 2025-01-10 DIAGNOSIS — R79.89 ELEVATED TSH: ICD-10-CM

## 2025-01-10 RX ORDER — LEVOTHYROXINE SODIUM 50 UG/1
50 TABLET ORAL DAILY
Qty: 90 TABLET | Refills: 1 | Status: SHIPPED | OUTPATIENT
Start: 2025-01-10

## 2025-01-10 RX ORDER — GABAPENTIN 300 MG/1
600 CAPSULE ORAL
Qty: 180 CAPSULE | Refills: 1 | Status: SHIPPED | OUTPATIENT
Start: 2025-01-10

## 2025-01-10 NOTE — TELEPHONE ENCOUNTER
Yes, referral to Dr. Griffiths was in October and a new order for referral to [facility] general surgery was placed at the beginning of December.  This referral should have been for [facility] general surgery.

## 2025-01-20 ENCOUNTER — TELEPHONE (OUTPATIENT)
Dept: FAMILY MEDICINE CLINIC | Facility: CLINIC | Age: 70
End: 2025-01-20
Payer: MEDICARE

## 2025-01-20 DIAGNOSIS — E11.40 TYPE 2 DIABETES MELLITUS WITH DIABETIC NEUROPATHY, WITHOUT LONG-TERM CURRENT USE OF INSULIN: ICD-10-CM

## 2025-01-20 DIAGNOSIS — E53.8 B12 DEFICIENCY: ICD-10-CM

## 2025-01-20 RX ORDER — PIOGLITAZONE 15 MG/1
15 TABLET ORAL DAILY
Qty: 30 TABLET | Refills: 3 | Status: SHIPPED | OUTPATIENT
Start: 2025-01-20

## 2025-01-20 RX ORDER — LANOLIN ALCOHOL/MO/W.PET/CERES
1000 CREAM (GRAM) TOPICAL DAILY
Qty: 90 TABLET | Refills: 1 | Status: SHIPPED | OUTPATIENT
Start: 2025-01-20

## 2025-01-20 NOTE — TELEPHONE ENCOUNTER
Caller: Suellen Rubin    Relationship: Self    Best call back number:     What medication are you requesting: INCREASE DOSAGE OF MOUNJARO       Have you had these symptoms before:    [x] Yes  [] No    Have you been treated for these symptoms before:   [x] Yes  [] No    If a prescription is needed, what is your preferred pharmacy and phone number: [facility] - [place], [place] - [address] [facility] [phone] [facility] [phone] FX     Additional notes:

## 2025-01-20 NOTE — TELEPHONE ENCOUNTER
Renewed Actos and vitamin B12 and increased Mounjuro to 15 mg as per request.  Additionally I am placing order for labs to do 1 day to 1 week prior to appointment on February 19.

## 2025-02-18 NOTE — PROGRESS NOTES
Chief Complaint  Diabetes    History of Present Illness  Suellen Rubin presents today for follow up on diabetes    Diabetes  Patient does check blood sugar at home 1 times daily.  Per patient fasting blood sugars range between 79 to 152.  Associated symptoms include None  Per patient current diet is Variety of foods.  Patient does avoid concentrated sweets.  Patient does not see podiatry.  Patient see's [facility] for diabetic eye exam and last eye exam was mid 2024.  Patient reports they are taking medications as prescribed and they are not having side effects.  Last A1c was 6.2 on 2/10/25.      History of Present Illness  The patient is a 70-year-old female who presents for a follow-up on diabetes and weight management. She had been under the care of Dr. Recinos for pain management and has an upcoming appointment with Dr. Pierce on 03/19/2025 to establish care. She has also had an evaluation with Dr. Brewer regarding surgery for a left lower abdominal hernia at the end of December.    She reports no adverse effects from her current medication, Mounjaro, which she finds beneficial in controlling her appetite. She has discontinued glimepiride due to episodes of hypoglycemia. She recalls an instance of dizziness upon waking, with a blood glucose level in the 70s, which was resolved by consuming juice. She has eliminated sugar from her diet but keeps candy on hand for occasional cravings. She has switched to a sugar substitute. She is on Mounjaro, Actos, and metformin for diabetes.    She has been actively working on weight loss, with a current weight of 184 pounds as per her home scale. She reports a significant improvement in her overall health status and states overall she feels much better.    She experienced bilateral foot swelling approximately 2 weeks ago, which she attributes to excessive walking. She managed this with compression socks for 3 days and dietary salt reduction. She reports that the swelling has  not recurred since. She also reports a painful knot in her leg, which has since flattened.    She had been under the care of Dr. Recinos for pain management and has an upcoming appointment with Dr. Busch on 03/19/2025 to establish care.     She has also had an evaluation with Dr. Breewr regarding surgery for a hernia at the end of December. She has not yet scheduled her hernia surgery and plans to revisit Dr. Brewer at the end of March for reevaluation. She was advised to lose additional weight prior to the procedure. She was informed that the surgery would be a major one, requiring a hospital stay of 5 to 7 days. She was also advised to seek immediate medical attention if she experiences any pain.    She has received all her vaccinations except for the pneumonia vaccine, which she plans to receive today. She has quit smoking 7 years ago. She started smoking in her teens but did not smoke heavily until 1983. She smoked 2 packs a day for most of that time.    Supplemental Information   She has not yet scheduled her hernia surgery and plans to revisit Dr. Brewer at the end of March for reevaluation. She was advised to lose additional weight prior to the procedure. She was informed that the surgery would be a major one, requiring a hospital stay of 5 to 7 days. She was also advised to seek immediate medical attention if she experiences any pain. She reports an improvement in her bowel movements and a decrease in diarrhea frequency. She maintains a regular intake of fiber gummies and vitamin gummies. She is on calcium with vitamin D supplement.    SOCIAL HISTORY  The patient quit smoking 7 years ago. She smoked 2 packs a day from 1983 until she quit after 35 years.    MEDICATIONS  Current: Tirzepatide, pioglitazone, atorvastatin, losartan, omeprazole, hydroxyzine, gabapentin, hydrocodone, metformin, fiber gummies, calcium gummies, vitamin D.  Discontinued: Glimepiride.    IMMUNIZATIONS  She has received the Shingrix  vaccine on January 23 and plans to get the second dose in a month or so. She received the tetanus vaccine about 2 weeks ago. She will receive the pneumonia vaccine today.      Patient Care Team:  Julissa Butler MD as PCP - General (Family Medicine)  Alfred Olsen RN as Ambulatory  ([facility])   Current Outpatient Medications on File Prior to Visit   Medication Sig    Accu-Chek FastClix Lancets misc Use 1 Piece Daily.    atorvastatin (LIPITOR) 20 MG tablet Take 1 tablet by mouth Daily.    baclofen (LIORESAL) 10 MG tablet Take 1 tablet by mouth 3 (Three) Times a Day.    gabapentin (NEURONTIN) 300 MG capsule TAKE 2 CAPSULES BY MOUTH EVERY NIGHT AT BEDTIME    glucose blood (Accu-Chek Guide) test strip Use as instructed    HYDROcodone-acetaminophen (NORCO) 7.5-325 MG per tablet Take 1 tablet by mouth 4 (Four) Times a Day As Needed for Severe Pain.    [START ON 2/21/2025] HYDROcodone-acetaminophen (NORCO) 7.5-325 MG per tablet Take 1 tablet by mouth 4 (Four) Times a Day As Needed for Severe Pain.    [START ON 3/20/2025] HYDROcodone-acetaminophen (NORCO) 7.5-325 MG per tablet Take 1 tablet by mouth 4 (Four) Times a Day As Needed for Severe Pain.    hydrOXYzine (ATARAX) 25 MG tablet TAKE 1 TABLET BY MOUTH 3 TIMES DAILY AS NEEDED FOR ITCHING    levothyroxine (SYNTHROID, LEVOTHROID) 50 MCG tablet Take 1 tablet by mouth Daily.    loperamide (IMODIUM) 2 MG capsule Take 1 capsule by mouth 4 (Four) Times a Day As Needed for Diarrhea.    losartan (COZAAR) 100 MG tablet TAKE 1 TABLET BY MOUTH DAILY    metFORMIN (GLUCOPHAGE) 500 MG tablet TAKE 2 TABLETS BY MOUTH TWICE DAILY with meals    multivitamin with minerals (MULTIVITAMIN ADULT PO) Take 1 tablet by mouth Daily.    nystatin (MYCOSTATIN) 594147 UNIT/GM powder Apply  topically to the appropriate area as directed 3 (Three) Times a Day.    omeprazole (priLOSEC) 20 MG capsule Take 1 capsule by mouth Daily.    ondansetron (ZOFRAN) 4 MG tablet Take 1  "tablet by mouth Every 8 (Eight) Hours As Needed for Nausea or Vomiting.    polyethylene glycol (MiraLax) 17 GM/SCOOP powder Take 17 g by mouth Daily.    saccharomyces boulardii (Florastor) 250 MG capsule Take 1 capsule by mouth 2 (Two) Times a Day.    Tirzepatide 15 MG/0.5ML solution auto-injector Inject 15 mg under the skin into the appropriate area as directed 1 (One) Time Per Week.    VITAFUSION FIBER WELL PO Take 1 capsule by mouth Daily.    vitamin B-12 (CYANOCOBALAMIN) 1000 MCG tablet Take 1 tablet by mouth Daily.    [DISCONTINUED] glimepiride (AMARYL) 4 MG tablet Take 1 tablet by mouth Every 12 (Twelve) Hours.    [DISCONTINUED] pioglitazone (ACTOS) 15 MG tablet Take 1 tablet by mouth Daily.    Diclofenac Sodium (VOLTAREN) 1 % gel gel Apply 4 g topically to the appropriate area as directed 4 (Four) Times a Day. Dose is 4 grams for knees, ankles, feet.  Dose is 2 grams for elbows, wrists, hands. (Patient not taking: Reported on 2/19/2025)     No current facility-administered medications on file prior to visit.       Objective   Vital Signs:   /72 (BP Location: Right arm, Patient Position: Sitting, Cuff Size: Large Adult)   Pulse 102   Temp 97.8 °F (36.6 °C) (Temporal)   Resp 18   Ht 157.5 cm (62\")   Wt 85.7 kg (189 lb)   SpO2 100%   BMI 34.57 kg/m²    BP Readings from Last 3 Encounters:   02/19/25 118/72   12/30/24 135/75   11/18/24 136/88     Wt Readings from Last 3 Encounters:   02/19/25 85.7 kg (189 lb)   12/30/24 86.6 kg (191 lb)   11/18/24 93.1 kg (205 lb 3.2 oz)         Physical Exam     Physical Exam  Lungs were auscultated.  Heart was examined.    Vital Signs  Weight is 189.      No visits with results within 1 Day(s) from this visit.   Latest known visit with results is:   Results Encounter on 02/10/2025   Component Date Value Ref Range Status    Hemoglobin A1C 02/10/2025 6.2 (H)  4.8 - 5.6 % Final    Comment:          Prediabetes: 5.7 - 6.4           Diabetes: >6.4           Glycemic " control for adults with diabetes: <7.0      Glucose 02/10/2025 96  70 - 99 mg/dL Final    BUN 02/10/2025 11  8 - 27 mg/dL Final    Creatinine 02/10/2025 1.04 (H)  0.57 - 1.00 mg/dL Final    EGFR Result 02/10/2025 57 (L)  >59 mL/min/1.73 Final    BUN/Creatinine Ratio 02/10/2025 11 (L)  12 - 28 Final    Sodium 02/10/2025 139  134 - 144 mmol/L Final    Potassium 02/10/2025 4.7  3.5 - 5.2 mmol/L Final    Chloride 02/10/2025 101  96 - 106 mmol/L Final    Total CO2 02/10/2025 22  20 - 29 mmol/L Final    Calcium 02/10/2025 9.4  8.7 - 10.3 mg/dL Final    Total Protein 02/10/2025 7.0  6.0 - 8.5 g/dL Final    Albumin 02/10/2025 4.0  3.8 - 4.8 g/dL Final    Globulin 02/10/2025 3.0  1.5 - 4.5 g/dL Final    Total Bilirubin 02/10/2025 0.3  0.0 - 1.2 mg/dL Final    Alkaline Phosphatase 02/10/2025 92  44 - 121 IU/L Final    AST (SGOT) 02/10/2025 33  0 - 40 IU/L Final    ALT (SGPT) 02/10/2025 24  0 - 32 IU/L Final     A1C Last 3 Results          8/7/2024    08:23 11/13/2024    09:54 2/10/2025    12:48   HGBA1C Last 3 Results   Hemoglobin A1C 8.6  6.9  6.2      Lab Results   Component Value Date    CHLPL 126 11/13/2024    TRIG 165 (H) 11/13/2024    HDL 29 (L) 11/13/2024    LDL 69 11/13/2024     Lab Results   Component Value Date    TSH 2.670 11/13/2024     Lab Results   Component Value Date    GLUCOSE 96 02/10/2025    BUN 11 02/10/2025    CREATININE 1.04 (H) 02/10/2025    EGFRIFNONA 72 12/15/2021    EGFRIFAFRI 83 12/15/2021    BCR 11 (L) 02/10/2025    K 4.7 02/10/2025    CO2 22 02/10/2025    CALCIUM 9.4 02/10/2025    ALBUMIN 4.0 02/10/2025    AST 33 02/10/2025    ALT 24 02/10/2025     Lab Results   Component Value Date    WBC 5.9 11/13/2024    HGB 14.3 11/13/2024    HCT 44.1 11/13/2024    MCV 92 11/13/2024     11/13/2024             Results  Laboratory Studies  A1c on February 10 was down to 6.2.             Assessment and Plan    Diagnoses and all orders for this visit:    1. Type 2 diabetes mellitus with diabetic neuropathy,  without long-term current use of insulin (Primary)  -     Microalbumin / Creatinine Urine Ratio - Urine, Clean Catch  -     Hemoglobin A1c; Future  -     Comprehensive Metabolic Panel; Future    2. Class 1 obesity with body mass index (BMI) of 34.0 to 34.9 in adult, unspecified obesity type, unspecified whether serious comorbidity present    3. Former smoker    4. Encounter for screening for lung cancer  -      CT Chest Low Dose Cancer Screening WO; Future    5. Former cigarette smoker  -      CT Chest Low Dose Cancer Screening WO; Future    6. Acquired hypothyroidism  -     TSH; Future  -     T3, Free; Future    7. Vitamin D deficiency  -     Vitamin D,25-Hydroxy; Future    8. Mixed hyperlipidemia  -     Lipid Panel; Future    9. Hypertension, essential  -     Comprehensive Metabolic Panel; Future  -     CBC & Differential; Future        Assessment & Plan  1. Diabetes Mellitus.  Her A1c levels have shown significant improvement, decreasing from 9.4 a year ago to 6.2 currently. This improvement is attributed to her medication regimen and weight loss efforts. She has discontinued glimepiride due to episodes of hypoglycemia. She is currently on Mounjaro, Actos, and metformin for diabetes management. She will discontinue Actos 15 mg. The continuation of metformin is recommended due to its cardioprotective benefits, however, if her A1c remains below 6.5, discontinuation of metformin may be considered. A recheck of her diabetes status is scheduled for 3 months from now, with labs including A1c, CMP, lipids, CBC, vitamin D, TSH, and T4 to be conducted prior to the visit.    2. Weight Management.  She has lost 10 pounds since September and reports feeling better. She is working on further weight loss as advised by her surgeon.    3. Hernia.  She is scheduled to see Dr. Brewer at the end of March for reevaluation and to make a plan for hernia surgery. She has been advised to lose more weight to improve surgical outcomes. She  is aware of the need to seek immediate medical attention if she experiences any pain.    4. Bilateral Foot Swelling.  She experienced significant foot swelling 2 weeks ago, likely due to excessive salt intake and the use of Actos. She has since reduced her salt intake and discontinuing Actos today. She is advised to use compression socks cautiously, ensuring they are not too tight or causing discomfort. She should wear them in the morning when swelling is minimal and remove them if they become uncomfortable during the day. Overnight use is not recommended.    5. Health Maintenance.  She has received her Shingrix vaccine on January 23 and plans to get the second dose after March 23. She received her tetanus vaccine approximately 2 weeks ago but needs to obtain a record of it. She will receive her pneumonia vaccine today. She quit smoking 7 years ago after a 35-year history of smoking up to 2 packs a day. A CT scan of her chest will be ordered to screen for lung cancer nodules, given her smoking history.    Follow-up  The patient will follow up in 3 months.      Medications Discontinued During This Encounter   Medication Reason    glimepiride (AMARYL) 4 MG tablet *Therapy completed    pioglitazone (ACTOS) 15 MG tablet *Therapy completed         Follow Up     Return in about 3 months (around 5/19/2025) for Recheck, diabetes, with Labs.    Patient was given instructions and counseling regarding her condition or for health maintenance advice. Please see specific information pulled into the AVS if appropriate.     Patient or patient representative verbalized consent for the use of Ambient Listening during the visit with  Julissa Butler MD for chart documentation. 2/19/2025  16:58 EST

## 2025-02-19 ENCOUNTER — OFFICE VISIT (OUTPATIENT)
Dept: FAMILY MEDICINE CLINIC | Facility: CLINIC | Age: 70
End: 2025-02-19
Payer: MEDICARE

## 2025-02-19 VITALS
RESPIRATION RATE: 18 BRPM | HEART RATE: 102 BPM | HEIGHT: 62 IN | TEMPERATURE: 97.8 F | WEIGHT: 189 LBS | OXYGEN SATURATION: 100 % | DIASTOLIC BLOOD PRESSURE: 72 MMHG | SYSTOLIC BLOOD PRESSURE: 118 MMHG | BODY MASS INDEX: 34.78 KG/M2

## 2025-02-19 DIAGNOSIS — E55.9 VITAMIN D DEFICIENCY: ICD-10-CM

## 2025-02-19 DIAGNOSIS — E03.9 ACQUIRED HYPOTHYROIDISM: ICD-10-CM

## 2025-02-19 DIAGNOSIS — Z87.891 FORMER CIGARETTE SMOKER: ICD-10-CM

## 2025-02-19 DIAGNOSIS — E66.811 CLASS 1 OBESITY WITH BODY MASS INDEX (BMI) OF 34.0 TO 34.9 IN ADULT, UNSPECIFIED OBESITY TYPE, UNSPECIFIED WHETHER SERIOUS COMORBIDITY PRESENT: ICD-10-CM

## 2025-02-19 DIAGNOSIS — Z12.2 ENCOUNTER FOR SCREENING FOR LUNG CANCER: ICD-10-CM

## 2025-02-19 DIAGNOSIS — E11.40 TYPE 2 DIABETES MELLITUS WITH DIABETIC NEUROPATHY, WITHOUT LONG-TERM CURRENT USE OF INSULIN: Primary | ICD-10-CM

## 2025-02-19 DIAGNOSIS — Z87.891 FORMER SMOKER: ICD-10-CM

## 2025-02-19 DIAGNOSIS — I10 HYPERTENSION, ESSENTIAL: ICD-10-CM

## 2025-02-19 DIAGNOSIS — E78.2 MIXED HYPERLIPIDEMIA: ICD-10-CM

## 2025-02-19 PROCEDURE — 3044F HG A1C LEVEL LT 7.0%: CPT | Performed by: FAMILY MEDICINE

## 2025-02-19 PROCEDURE — G2211 COMPLEX E/M VISIT ADD ON: HCPCS | Performed by: FAMILY MEDICINE

## 2025-02-19 PROCEDURE — 1160F RVW MEDS BY RX/DR IN RCRD: CPT | Performed by: FAMILY MEDICINE

## 2025-02-19 PROCEDURE — 3074F SYST BP LT 130 MM HG: CPT | Performed by: FAMILY MEDICINE

## 2025-02-19 PROCEDURE — 1159F MED LIST DOCD IN RCRD: CPT | Performed by: FAMILY MEDICINE

## 2025-02-19 PROCEDURE — 3078F DIAST BP <80 MM HG: CPT | Performed by: FAMILY MEDICINE

## 2025-02-19 PROCEDURE — 99214 OFFICE O/P EST MOD 30 MIN: CPT | Performed by: FAMILY MEDICINE

## 2025-02-19 PROCEDURE — 1126F AMNT PAIN NOTED NONE PRSNT: CPT | Performed by: FAMILY MEDICINE

## 2025-02-20 LAB
ALBUMIN/CREAT UR: 4 MG/G CREAT (ref 0–29)
CREAT UR-MCNC: 80.3 MG/DL
MICROALBUMIN UR-MCNC: 3.2 UG/ML

## 2025-02-26 NOTE — TELEPHONE ENCOUNTER
Caller: Suellen Rubin    Relationship: Self    Best call back number:   Telephone Information:   Mobile [phone]         Requested Prescriptions:   Requested Prescriptions     Pending Prescriptions Disp Refills    Tirzepatide 15 MG/0.5ML solution auto-injector 2 mL 3     Sig: Inject 15 mg under the skin into the appropriate area as directed 1 (One) Time Per Week.        Pharmacy where request should be sent: [facility], [place] - [address] [phone] [facility] [phone]      Last office visit with prescribing clinician: 2/19/2025   Last telemedicine visit with prescribing clinician: Visit date not found   Next office visit with prescribing clinician: 5/20/2025     Additional details provided by patient:     Does the patient have less than a 3 day supply:  [] Yes  [] No    Would you like a call back once the refill request has been completed: [] Yes [] No    If the office needs to give you a call back, can they leave a voicemail: [] Yes [] No    Desiree Alaniz Rep   02/26/25 11:40 EST

## 2025-03-10 DIAGNOSIS — L29.9 PRURITIC CONDITION: ICD-10-CM

## 2025-03-10 DIAGNOSIS — E11.65 TYPE 2 DIABETES MELLITUS WITH HYPERGLYCEMIA, WITHOUT LONG-TERM CURRENT USE OF INSULIN: ICD-10-CM

## 2025-03-10 RX ORDER — HYDROXYZINE HYDROCHLORIDE 25 MG/1
TABLET, FILM COATED ORAL
Qty: 90 TABLET | Refills: 3 | Status: SHIPPED | OUTPATIENT
Start: 2025-03-10

## 2025-03-12 ENCOUNTER — TELEPHONE (OUTPATIENT)
Dept: FAMILY MEDICINE CLINIC | Facility: CLINIC | Age: 70
End: 2025-03-12
Payer: MEDICARE

## 2025-03-12 NOTE — TELEPHONE ENCOUNTER
Spoke with the patient to see if she has already completed the CT Chest Low Dose Cancer Screening WO or if she plans to schedule. She states she will call and schedule it today.

## 2025-03-18 ENCOUNTER — HOSPITAL ENCOUNTER (OUTPATIENT)
Dept: CT IMAGING | Facility: HOSPITAL | Age: 70
Discharge: HOME | End: 2025-03-18
Admitting: FAMILY MEDICINE
Payer: MEDICARE

## 2025-03-18 DIAGNOSIS — Z12.2 ENCOUNTER FOR SCREENING FOR LUNG CANCER: ICD-10-CM

## 2025-03-18 DIAGNOSIS — Z87.891 FORMER CIGARETTE SMOKER: ICD-10-CM

## 2025-03-18 PROCEDURE — 71271 CT THORAX LUNG CANCER SCR C-: CPT

## 2025-03-19 ENCOUNTER — OFFICE VISIT (OUTPATIENT)
Dept: PAIN MEDICINE | Facility: CLINIC | Age: 70
End: 2025-03-19
Payer: MEDICARE

## 2025-03-19 VITALS
WEIGHT: 180 LBS | BODY MASS INDEX: 32.92 KG/M2 | HEART RATE: 88 BPM | OXYGEN SATURATION: 98 % | DIASTOLIC BLOOD PRESSURE: 73 MMHG | SYSTOLIC BLOOD PRESSURE: 106 MMHG | RESPIRATION RATE: 16 BRPM

## 2025-03-19 DIAGNOSIS — M47.814 THORACIC SPONDYLOSIS WITHOUT MYELOPATHY: ICD-10-CM

## 2025-03-19 DIAGNOSIS — M47.817 LUMBOSACRAL SPONDYLOSIS WITHOUT MYELOPATHY: ICD-10-CM

## 2025-03-19 DIAGNOSIS — Z79.899 HIGH RISK MEDICATION USE: Primary | ICD-10-CM

## 2025-03-19 DIAGNOSIS — M25.50 POLYARTHRALGIA: ICD-10-CM

## 2025-03-19 RX ORDER — HYDROCODONE BITARTRATE AND ACETAMINOPHEN 7.5; 325 MG/1; MG/1
1 TABLET ORAL 4 TIMES DAILY PRN
Qty: 120 TABLET | Refills: 0 | Status: SHIPPED | OUTPATIENT
Start: 2025-04-24

## 2025-03-19 RX ORDER — HYDROCODONE BITARTRATE AND ACETAMINOPHEN 7.5; 325 MG/1; MG/1
1 TABLET ORAL 4 TIMES DAILY PRN
Qty: 120 TABLET | Refills: 0 | Status: SHIPPED | OUTPATIENT
Start: 2025-03-25

## 2025-03-19 NOTE — PROGRESS NOTES
Subjective   CC upper and lower back pain, joint  Suellen Rubin is a 70 y.o. female with chronic polyarthralgia, thoracic and lumbar back pain here for follow-up.   Former patient of Dr. Recinos new to me  Denies any new issues or concerns today.  Continues thoracic paraspinal myofascial pain worse with activity.  Denies radicular pain.  Good relief of hydrocodone and denies side effects    Chronic lumbar and thoracic back pain mostly axial occasionally radiating to hips, constant worse with prolonged activity.  Denies new weakness, saddle anesthesia, bladder bowel continence  Chronic polyarthralgia  Marginal relief with physical therapy, anti-inflammatories muscle relaxers or previous injections for  Pain interfering with ADL    Utilizes hydrocodone with good relief of functional benefit no side effects    Imaging reviewed  L-spine x-ray : 5 lumbar type vertebral bodies.  Alignment is anatomic.  Vertebral bodies maintain normal height. There is mild narrowing of the disc spaces diffusely with mild marginal osteophyte formation. There is moderate mid and lower lumbar facet hypertrophy. Spinous and transverse processes and pedicles appear intact.  Sacroiliac joints appear symmetric. No acute fracture or subluxation. No spondylolysis.  Spine x-ray : Mild degenerative changes    Pain Assessment   Location of Pain: Lower Back, R Hip, L Hip, neck pain, joint  Description of Pain: Dull/Aching, Throbbing, Stabbing  Previous Pain Rating :4  Current Pain Ratin  Aggravating Factors: Activity  Alleviating Factors: Rest, Medication  Pain onset over 12 weeks ago  Interferes with ADL's.   [place] back pain disability scale scanned in chart    PEG Assessment   What number best describes your pain on average in the past week?4  What number best describes how, during the past week, pain has interfered with your enjoyment of life?0  What number best describes how, during the past week, pain has interfered with your  general activity?  10    The following portions of the patient's history were reviewed and updated as appropriate: allergies, current medications, past family history, past medical history, past social history, past surgical history and problem list.    Review of Systems   Musculoskeletal:  Positive for arthralgias and back pain.   All other systems reviewed and are negative.      Objective   Physical Exam  Vitals reviewed.   Constitutional:       General: She is not in acute distress.  Pulmonary:      Effort: Pulmonary effort is normal.       /73 (BP Location: Right arm, Patient Position: Sitting, Cuff Size: Adult)   Pulse 88   Resp 16   Wt 81.6 kg (180 lb)   SpO2 98%   BMI 32.92 kg/m²     PHQ 9 on chart  Opioid risk tool low risk    Assessment & Plan   Diagnoses and all orders for this visit:    1. Lumbosacral spondylosis without myelopathy (Primary)  -     HYDROcodone-acetaminophen (NORCO) 7.5-325 MG per tablet; Take 1 tablet by mouth 4 (Four) Times a Day As Needed for Severe Pain. DNF before 4/24/2025  Dispense: 120 tablet; Refill: 0  -     HYDROcodone-acetaminophen (NORCO) 7.5-325 MG per tablet; Take 1 tablet by mouth 4 (Four) Times a Day As Needed for Severe Pain.  Dispense: 120 tablet; Refill: 0    2. Thoracic spondylosis without myelopathy    3. Polyarthralgia    4. High risk medication use      Summary  Suellen Rubin is a 70 y.o. female with chronic polyarthralgia, thoracic and lumbar back pain here for follow-up.   Painful lumbar and thoracic DDD spondylosis, myofascial pain.  Chronic polyarthralgia    Former patient of Dr. Recinos new to me  Denies any new issues or concerns today.  Continues thoracic paraspinal myofascial pain worse with activity.  Denies radicular pain.  Good relief of hydrocodone and denies side effects    Continue hydrocodone 7.5/325 4 times daily as needed for severe pain.  UDS and inspect reviewed  Discussed risk of tolerance, dependence, respiratory depression,  coma and death associated with use of oral opioids for treatment of chronic nonmalignant pain.     RTC 2 months    Note is dictated utilizing voice recognition software. Unfortunately this leads to occasional typographical errors. I apologize in advance if the situation occurs. If questions occur please do not hesitate to call our office.

## 2025-03-20 DIAGNOSIS — R11.0 NAUSEA: ICD-10-CM

## 2025-03-21 RX ORDER — ONDANSETRON 4 MG/1
4 TABLET, FILM COATED ORAL EVERY 8 HOURS PRN
Qty: 20 TABLET | Refills: 1 | Status: SHIPPED | OUTPATIENT
Start: 2025-03-21

## 2025-03-26 ENCOUNTER — TELEPHONE (OUTPATIENT)
Dept: FAMILY MEDICINE CLINIC | Facility: CLINIC | Age: 70
End: 2025-03-26
Payer: MEDICARE

## 2025-03-26 DIAGNOSIS — E11.40 TYPE 2 DIABETES MELLITUS WITH DIABETIC NEUROPATHY, WITHOUT LONG-TERM CURRENT USE OF INSULIN: Primary | ICD-10-CM

## 2025-03-26 NOTE — TELEPHONE ENCOUNTER
Please inform patient that we have successfully titrated up to the maximum dose of tirzepatide/Mounjaro.  Unless she has any side effects or problems we will continue at 15 mg weekly and sent in a prescription with 8 years worth of refills.

## 2025-03-26 NOTE — TELEPHONE ENCOUNTER
Caller: Suellen Rubin    Relationship: Self    Best call back number:     [phone] (Mobile)       What was the call regarding:  PATIENT CALLED IN TO GET HER MOUNJARO REFILLED     SHE WASN'T SURE IF YOU WANTED TO KEEP HER AT THE SAME DOSAGE OR INCREASE IT TO THE NEXT DOSE     CAN YOU ADVISE     Is it okay if the provider responds through MyChart: CALL AND ADVISE

## 2025-05-12 RX ORDER — ATORVASTATIN CALCIUM 20 MG/1
20 TABLET, FILM COATED ORAL DAILY
Qty: 90 TABLET | Refills: 3 | Status: SHIPPED | OUTPATIENT
Start: 2025-05-12

## 2025-05-19 NOTE — PROGRESS NOTES
Chief Complaint  Diabetes, Hypertension, Hyperlipidemia, Hypothyroidism, and Vitamin D Deficiency    History of Present Illness  Suellen Rubin presents today for follow up on diabetes, hypertension, hyperlipidemia, hypothyroidism, and vitamin D deficiency    Diabetes  Patient does check blood sugar at home 2 times a month.  Per patient fasting blood sugars range between low 100's.  Associated symptoms include None.  Per patient current diet is Well Balanced.  Patient does avoid concentrated sweets.  Patient does not see podiatry.  Patient see's [facility] for diabetic eye exam and last eye exam was 04/2025.  Patient reports they are taking medications as prescribed and they are not having side effects.  Last A1c was 5.5 on 5/20/25.     Hypertension  Patient does not check blood pressure at home.   Patient denies  blurred vision, chest pain, dyspnea, headache, neck aches, orthopnea, palpitations, paroxysmal nocturnal dyspnea, peripheral edema, pulsating in the ears, and tiredness/fatigue   Patient reports they are taking medications as prescribed and they are not having side effects.    Hyperlipidemia  Patient is not following a low cholesterol diet.   Currently is on statin therapy.  Patient reports is not exercising.  Patient reports they are taking medications as prescribed and they are not having side effects.    Hypothyroidism  Patient presents for evaluation of thyroid function.   Symptoms: denies fatigue, weight changes, heat/cold intolerance, bowel/skin changes or CVS symptoms.  The problem has been stable.    Patient reports they are taking medications as prescribed and they are not having side effects.    Vitamin D Deficiency   Symptoms include  none.  Patient reports is not taking vitamin d supplement.     History of Present Illness  The patient is a 70-year-old female who presents for follow-up on diabetes.    She reports significant weight loss, having lost 22 pounds since her last visit, resulting in  a decrease in clothing size from 24 to 14. Her current medication regimen includes tirzepatide 15 milligrams weekly, with the last dose administered on the previous Wednesday, 6 days ago. She recalls being advised to discontinue metformin a week prior to her neck surgery inquiring is whether it needs to be stopped prior to upcoming hernia surgery. Blood glucose levels are typically around 100, occasionally spiking to 105 or 115, and sometimes dropping to 96 or 93. She has eliminated sugar from her diet and uses Truvia as a substitute in her coffee. She expresses a positive response to Mounjaro and reports feeling well overall. Her current metformin dosage is two tablets in the morning and two in the evening.    She is scheduled for hernia surgery on 05/28/2025 with Dr. Brewer. She has not received any paperwork or information regarding the time of the surgery and may need to call them back. She was informed that she might need to go to the hospital the day before the surgery. She is not taking any blood thinners, aspirin, fish oils, omega-3 fatty acids, Advil, or ibuprofen.    She tweaked her back when she went to  a pop soil but is starting to recover. She has some muscle relaxers at home.    PAST SURGICAL HISTORY:  Neck surgery      Patient Care Team:  Julissa Butler MD as PCP - General (Family Medicine)   Current Outpatient Medications on File Prior to Visit   Medication Sig    Accu-Chek FastClix Lancets misc Use 1 Piece Daily.    atorvastatin (LIPITOR) 20 MG tablet Take 1 tablet by mouth Daily.    baclofen (LIORESAL) 10 MG tablet Take 1 tablet by mouth 3 (Three) Times a Day.    gabapentin (NEURONTIN) 300 MG capsule TAKE 2 CAPSULES BY MOUTH EVERY NIGHT AT BEDTIME    glucose blood (Accu-Chek Guide) test strip Use as instructed    HYDROcodone-acetaminophen (NORCO) 7.5-325 MG per tablet Take 1 tablet by mouth 4 (Four) Times a Day As Needed for Severe Pain. DNF before 4/24/2025     "HYDROcodone-acetaminophen (NORCO) 7.5-325 MG per tablet Take 1 tablet by mouth 4 (Four) Times a Day As Needed for Severe Pain.    hydrOXYzine (ATARAX) 25 MG tablet TAKE 1 TABLET BY MOUTH 3 TIMES DAILY AS NEEDED FOR ITCHING    levothyroxine (SYNTHROID, LEVOTHROID) 50 MCG tablet Take 1 tablet by mouth Daily.    loperamide (IMODIUM) 2 MG capsule Take 1 capsule by mouth 4 (Four) Times a Day As Needed for Diarrhea.    losartan (COZAAR) 100 MG tablet TAKE 1 TABLET BY MOUTH DAILY    multivitamin with minerals (MULTIVITAMIN ADULT PO) Take 1 tablet by mouth Daily.    nystatin (MYCOSTATIN) 835808 UNIT/GM powder Apply  topically to the appropriate area as directed 3 (Three) Times a Day.    omeprazole (priLOSEC) 20 MG capsule Take 1 capsule by mouth Daily.    ondansetron (ZOFRAN) 4 MG tablet Take 1 tablet by mouth Every 8 (Eight) Hours As Needed for Nausea or Vomiting.    polyethylene glycol (MiraLax) 17 GM/SCOOP powder Take 17 g by mouth Daily.    saccharomyces boulardii (Florastor) 250 MG capsule Take 1 capsule by mouth 2 (Two) Times a Day.    Tirzepatide 15 MG/0.5ML solution auto-injector Inject 15 mg under the skin into the appropriate area as directed 1 (One) Time Per Week.    VITAFUSION FIBER WELL PO Take 1 capsule by mouth Daily.    vitamin B-12 (CYANOCOBALAMIN) 1000 MCG tablet Take 1 tablet by mouth Daily.    [DISCONTINUED] metFORMIN (GLUCOPHAGE) 500 MG tablet TAKE 2 TABLETS BY MOUTH TWICE DAILY WITH meals    Diclofenac Sodium (VOLTAREN) 1 % gel gel Apply 4 g topically to the appropriate area as directed 4 (Four) Times a Day. Dose is 4 grams for knees, ankles, feet.  Dose is 2 grams for elbows, wrists, hands. (Patient not taking: Reported on 2/19/2025)     No current facility-administered medications on file prior to visit.       Objective   Vital Signs:   /72 (BP Location: Right arm, Patient Position: Sitting, Cuff Size: Adult)   Pulse 102   Temp 97.7 °F (36.5 °C) (Temporal)   Resp 18   Ht 157.5 cm (62\")   " Wt 75.7 kg (166 lb 12.8 oz)   SpO2 98%   BMI 30.51 kg/m²    BP Readings from Last 3 Encounters:   05/20/25 122/72   03/19/25 106/73   02/19/25 118/72     Wt Readings from Last 3 Encounters:   05/20/25 75.7 kg (166 lb 12.8 oz)   03/19/25 81.6 kg (180 lb)   02/19/25 85.7 kg (189 lb)         Physical Exam  Vitals and nursing note reviewed.   Constitutional:       General: She is not in acute distress.     Appearance: Normal appearance. She is well-developed. She is not ill-appearing.      Comments: Significant visible weight loss visit 3 months ago   HENT:      Head: Normocephalic and atraumatic.   Cardiovascular:      Rate and Rhythm: Normal rate and regular rhythm.      Heart sounds: No murmur heard.  Pulmonary:      Effort: Pulmonary effort is normal.      Breath sounds: Normal breath sounds. No wheezing.   Abdominal:      General: Bowel sounds are normal.      Palpations: Abdomen is soft. There is no mass.      Tenderness: There is no abdominal tenderness. There is no right CVA tenderness, left CVA tenderness or guarding.      Hernia: A hernia is present.      Comments: Large protrusion left mid and lower abdomen consistent with ventral hernia without distinct palpable underlying hernia defect.  No significant tenderness, slight increased warmth, no redness.    Musculoskeletal:         General: Normal range of motion.   Skin:     General: Skin is warm and dry.      Findings: No rash.   Neurological:      Mental Status: She is alert and oriented to person, place, and time.   Psychiatric:         Mood and Affect: Mood normal.         Behavior: Behavior normal.         Thought Content: Thought content normal.         Judgment: Judgment normal.          Physical Exam        Office Visit on 05/20/2025   Component Date Value Ref Range Status    Hemoglobin A1C 05/20/2025 5.5  4.5 - 5.7 % Final    Lot Number 05/20/2025 #[id number]   Final    Expiration Date 05/20/2025 03/31/2027   Final     A1C Last 3 Results           11/13/2024    09:54 2/10/2025    12:48 5/20/2025    10:16   HGBA1C Last 3 Results   Hemoglobin A1C 6.9  6.2  5.5      Lab Results   Component Value Date    CHLPL 126 11/13/2024    TRIG 165 (H) 11/13/2024    HDL 29 (L) 11/13/2024    LDL 69 11/13/2024     Lab Results   Component Value Date    TSH 2.670 11/13/2024     Lab Results   Component Value Date    GLUCOSE 96 02/10/2025    BUN 11 02/10/2025    CREATININE 1.04 (H) 02/10/2025    EGFRIFNONA 72 12/15/2021    EGFRIFAFRI 83 12/15/2021    BCR 11 (L) 02/10/2025    K 4.7 02/10/2025    CO2 22 02/10/2025    CALCIUM 9.4 02/10/2025    ALBUMIN 4.0 02/10/2025    AST 33 02/10/2025    ALT 24 02/10/2025     Lab Results   Component Value Date    WBC 5.9 11/13/2024    HGB 14.3 11/13/2024    HCT 44.1 11/13/2024    MCV 92 11/13/2024     11/13/2024             Results  Labs   - A1c: 5.5             Assessment and Plan    Diagnoses and all orders for this visit:    1. Type 2 diabetes mellitus with diabetic neuropathy, without long-term current use of insulin (Primary)  -     POC Glycosylated Hemoglobin (Hb A1C)  -     metFORMIN (GLUCOPHAGE) 500 MG tablet; Take 2 tablets by mouth Daily With Breakfast.    2. Hypertension, essential    3. Mixed hyperlipidemia    4. Acquired hypothyroidism    5. Vitamin D deficiency    6. Class 1 obesity with body mass index (BMI) of 30.0 to 30.9 in adult, unspecified obesity type, unspecified whether serious comorbidity present    7. Former smoker        Assessment & Plan  1. Diabetes Mellitus.  - Her A1c levels have shown remarkable improvement, currently at 5.5, down from 6.2 three months ago, 8.6 nine months ago, and 9.4 a year ago. This indicates a significant enhancement in her glycemic control.  - She is advised to consult with her surgeon regarding the continuation of tirzepatide (Mounjaro) prior to her upcoming surgery on 05/28/2025. It is recommended that she abstain from taking another dose until she receives clearance from her surgeon,  following surgery. If instructed to discontinue the medication, she can resume the same dose as long as she takes a dose within two weeks of her last dose, provided it is deemed safe by her surgeon.  - She is also advised to withhold metformin for two days before and after her surgery unless otherwise directed by her surgeon. The potential risk of lactic acidosis associated with metformin was discussed. Given her current A1c level of 5.5, it is considered safe to temporarily discontinue metformin and postpone Mounjaro.  - Her metformin dosage will be reduced to once daily, for total dose of 1000 mg daily, and if her A1c levels remain stable, discontinuation of metformin will be considered at her next visit.    2. Preoperative Evaluation.  - She is scheduled for surgery on 05/28/2025.  - She is advised to contact Dr. Brewer's office to confirm her surgery plans, including arrival time and medication instructions. She should ensure she receives any necessary paperwork and clarify if there are any other medications she needs to stop before surgery.  - She is instructed not to take Mounjaro until cleared after surgery and to stop metformin two days before and after surgery unless otherwise directed by surgery.  - She is reassured that her blood glucose levels likely should not elevate excessively during this period. She is advised to inform her healthcare team if she is hospitalized post-surgery and counseled could require insulin administration due to discontinuation of her regular medications.    3. Back Pain.  - She reports tweaking her back while lifting a potting soil bag.  - She is advised to take Tylenol or use topicals for pain relief and avoid other anti-inflammatories before surgery.  - She may use heat and stretching exercises for additional relief.  - If needed, she can use a muscle relaxer she already has at home.    Follow-up  The patient will follow up in 3 months or sooner if any complications  arise.      Medications Discontinued During This Encounter   Medication Reason    metFORMIN (GLUCOPHAGE) 500 MG tablet          Follow Up     Return in about 3 months (around 8/20/2025) for Recheck diabetes or as needed.    Patient was given instructions and counseling regarding her condition or for health maintenance advice. Please see specific information pulled into the AVS if appropriate.     Patient or patient representative verbalized consent for the use of Ambient Listening during the visit with  Julissa Butler MD for chart documentation. 5/20/2025  14:31 EDT

## 2025-05-20 ENCOUNTER — OFFICE VISIT (OUTPATIENT)
Dept: FAMILY MEDICINE CLINIC | Facility: CLINIC | Age: 70
End: 2025-05-20
Payer: MEDICARE

## 2025-05-20 VITALS
SYSTOLIC BLOOD PRESSURE: 122 MMHG | WEIGHT: 166.8 LBS | BODY MASS INDEX: 30.69 KG/M2 | HEART RATE: 102 BPM | OXYGEN SATURATION: 98 % | TEMPERATURE: 97.7 F | HEIGHT: 62 IN | RESPIRATION RATE: 18 BRPM | DIASTOLIC BLOOD PRESSURE: 72 MMHG

## 2025-05-20 DIAGNOSIS — E55.9 VITAMIN D DEFICIENCY: ICD-10-CM

## 2025-05-20 DIAGNOSIS — Z87.891 FORMER SMOKER: ICD-10-CM

## 2025-05-20 DIAGNOSIS — I10 HYPERTENSION, ESSENTIAL: ICD-10-CM

## 2025-05-20 DIAGNOSIS — E66.811 CLASS 1 OBESITY WITH BODY MASS INDEX (BMI) OF 30.0 TO 30.9 IN ADULT, UNSPECIFIED OBESITY TYPE, UNSPECIFIED WHETHER SERIOUS COMORBIDITY PRESENT: ICD-10-CM

## 2025-05-20 DIAGNOSIS — E11.40 TYPE 2 DIABETES MELLITUS WITH DIABETIC NEUROPATHY, WITHOUT LONG-TERM CURRENT USE OF INSULIN: Primary | ICD-10-CM

## 2025-05-20 DIAGNOSIS — E78.2 MIXED HYPERLIPIDEMIA: ICD-10-CM

## 2025-05-20 DIAGNOSIS — E03.9 ACQUIRED HYPOTHYROIDISM: ICD-10-CM

## 2025-05-20 LAB
EXPIRATION DATE: NORMAL
HBA1C MFR BLD: 5.5 % (ref 4.5–5.7)
Lab: NORMAL

## 2025-05-28 DIAGNOSIS — M47.817 LUMBOSACRAL SPONDYLOSIS WITHOUT MYELOPATHY: ICD-10-CM

## 2025-05-28 RX ORDER — HYDROCODONE BITARTRATE AND ACETAMINOPHEN 7.5; 325 MG/1; MG/1
1 TABLET ORAL 4 TIMES DAILY PRN
Qty: 120 TABLET | Refills: 0 | OUTPATIENT
Start: 2025-05-28

## 2025-05-29 ENCOUNTER — TELEPHONE (OUTPATIENT)
Dept: PAIN MEDICINE | Facility: CLINIC | Age: 70
End: 2025-05-29
Payer: MEDICARE

## 2025-05-29 NOTE — TELEPHONE ENCOUNTER
Patient states she had a major hernia surgery on Tuesday and she didn't realize she missed her appt with us last week. Her surgeon did give her medication but not enough to last over the weekend. I did inform her that the Hydrocodone request was already denied due to her missing her appt with us. She would like me to ask if you could send any at all do to her increased pain and I sent her call to scheduling to get the earliest available appt which is this coming Monday. New inspect in chart

## 2025-05-29 NOTE — TELEPHONE ENCOUNTER
For surgical pain she can always call her surgeon if she needs a refill on that.  Will see her on Monday for regular follow-up

## 2025-06-02 ENCOUNTER — OFFICE VISIT (OUTPATIENT)
Dept: PAIN MEDICINE | Facility: CLINIC | Age: 70
End: 2025-06-02
Payer: MEDICARE

## 2025-06-02 VITALS
OXYGEN SATURATION: 98 % | RESPIRATION RATE: 16 BRPM | WEIGHT: 165 LBS | SYSTOLIC BLOOD PRESSURE: 126 MMHG | DIASTOLIC BLOOD PRESSURE: 72 MMHG | HEART RATE: 85 BPM | BODY MASS INDEX: 30.18 KG/M2

## 2025-06-02 DIAGNOSIS — G89.4 CHRONIC PAIN SYNDROME: ICD-10-CM

## 2025-06-02 DIAGNOSIS — M47.814 THORACIC SPONDYLOSIS WITHOUT MYELOPATHY: ICD-10-CM

## 2025-06-02 DIAGNOSIS — M25.50 POLYARTHRALGIA: ICD-10-CM

## 2025-06-02 DIAGNOSIS — Z79.899 HIGH RISK MEDICATION USE: ICD-10-CM

## 2025-06-02 DIAGNOSIS — M47.817 LUMBOSACRAL SPONDYLOSIS WITHOUT MYELOPATHY: Primary | ICD-10-CM

## 2025-06-02 PROCEDURE — G2211 COMPLEX E/M VISIT ADD ON: HCPCS | Performed by: ANESTHESIOLOGY

## 2025-06-02 PROCEDURE — 3074F SYST BP LT 130 MM HG: CPT | Performed by: ANESTHESIOLOGY

## 2025-06-02 PROCEDURE — 1125F AMNT PAIN NOTED PAIN PRSNT: CPT | Performed by: ANESTHESIOLOGY

## 2025-06-02 PROCEDURE — 99214 OFFICE O/P EST MOD 30 MIN: CPT | Performed by: ANESTHESIOLOGY

## 2025-06-02 PROCEDURE — 3078F DIAST BP <80 MM HG: CPT | Performed by: ANESTHESIOLOGY

## 2025-06-02 RX ORDER — HYDROCODONE BITARTRATE AND ACETAMINOPHEN 7.5; 325 MG/1; MG/1
1 TABLET ORAL 4 TIMES DAILY PRN
Qty: 120 TABLET | Refills: 0 | Status: SHIPPED | OUTPATIENT
Start: 2025-07-01

## 2025-06-02 RX ORDER — HYDROCODONE BITARTRATE AND ACETAMINOPHEN 7.5; 325 MG/1; MG/1
1 TABLET ORAL 4 TIMES DAILY PRN
Qty: 120 TABLET | Refills: 0 | Status: SHIPPED | OUTPATIENT
Start: 2025-06-02

## 2025-06-02 NOTE — PROGRESS NOTES
Subjective   CC upper and lower back pain, joint  Suellen Rubin is a 70 y.o. female with chronic polyarthralgia, thoracic and lumbar back pain here for follow-up.     S/P abdominal hernia repair last week, recovering well still has drains.  Was prescribed oxycodone postop.  Missed last appointment so she did not refill hydrocodone last month.  Noticed worsening pain polyarthralgia back pain.    Chronic lumbar and thoracic back pain mostly axial occasionally radiating to hips, constant worse with prolonged activity.  Denies new weakness, saddle anesthesia, bladder bowel continence  Chronic polyarthralgia  Marginal relief with physical therapy, anti-inflammatories muscle relaxers or previous injections for  Pain interfering with ADL    Utilizes hydrocodone with good relief of functional benefit no side effects    Imaging reviewed  L-spine x-ray : 5 lumbar type vertebral bodies.  Alignment is anatomic.  Vertebral bodies maintain normal height. There is mild narrowing of the disc spaces diffusely with mild marginal osteophyte formation. There is moderate mid and lower lumbar facet hypertrophy. Spinous and transverse processes and pedicles appear intact.  Sacroiliac joints appear symmetric. No acute fracture or subluxation. No spondylolysis.  Spine x-ray : Mild degenerative changes    Pain Assessment   Location of Pain: Lower Back, R Hip, L Hip, neck pain, joint  Description of Pain: Dull/Aching, Throbbing, Stabbing  Previous Pain Rating :4  Current Pain Ratin  Aggravating Factors: Activity  Alleviating Factors: Rest, Medication  Pain onset over 12 weeks ago  Interferes with ADL's.   [place] back pain disability scale scanned in chart    PEG Assessment   What number best describes your pain on average in the past week?4  What number best describes how, during the past week, pain has interfered with your enjoyment of life?0  What number best describes how, during the past week, pain has interfered with  your general activity?9    The following portions of the patient's history were reviewed and updated as appropriate: allergies, current medications, past family history, past medical history, past social history, past surgical history and problem list.    Review of Systems   Musculoskeletal:  Positive for arthralgias and back pain.   All other systems reviewed and are negative.      Objective   Physical Exam  Vitals reviewed.   Constitutional:       General: She is not in acute distress.  Pulmonary:      Effort: Pulmonary effort is normal.       /72 (BP Location: Right arm, Patient Position: Sitting, Cuff Size: Adult)   Pulse 85   Resp 16   Wt 74.8 kg (165 lb)   SpO2 98%   BMI 30.18 kg/m²     PHQ 9 on chart  Opioid risk tool low risk    Assessment & Plan   Diagnoses and all orders for this visit:    1. Lumbosacral spondylosis without myelopathy (Primary)  -     HYDROcodone-acetaminophen (NORCO) 7.5-325 MG per tablet; Take 1 tablet by mouth 4 (Four) Times a Day As Needed for Severe Pain. DNF before 7/1/2025  Dispense: 120 tablet; Refill: 0  -     HYDROcodone-acetaminophen (NORCO) 7.5-325 MG per tablet; Take 1 tablet by mouth 4 (Four) Times a Day As Needed for Severe Pain.  Dispense: 120 tablet; Refill: 0    2. Chronic pain syndrome    3. Thoracic spondylosis without myelopathy    4. Polyarthralgia  -     HYDROcodone-acetaminophen (NORCO) 7.5-325 MG per tablet; Take 1 tablet by mouth 4 (Four) Times a Day As Needed for Severe Pain. DNF before 7/1/2025  Dispense: 120 tablet; Refill: 0  -     HYDROcodone-acetaminophen (NORCO) 7.5-325 MG per tablet; Take 1 tablet by mouth 4 (Four) Times a Day As Needed for Severe Pain.  Dispense: 120 tablet; Refill: 0    5. High risk medication use      Summary  Suellen Rubin is a 70 y.o. female with chronic polyarthralgia, thoracic and lumbar back pain here for follow-up.   Painful lumbar and thoracic DDD spondylosis, myofascial pain.  Chronic polyarthralgia    Acute  postop pain, recovering from abdominal hernia repair.  Denies any new complaints otherwise.  S/P abdominal hernia repair last week, recovering well still has drains.  Was prescribed oxycodone postop.  Missed last appointment so she did not refill hydrocodone last month.  Noticed worsening pain polyarthralgia back pain.    Continue hydrocodone 7.5/325 4 times daily as needed for severe pain.  UDS and inspect reviewed  Discussed risk of tolerance, dependence, respiratory depression, coma and death associated with use of oral opioids for treatment of chronic nonmalignant pain.     RTC 2 months    Note is dictated utilizing voice recognition software. Unfortunately this leads to occasional typographical errors. I apologize in advance if the situation occurs. If questions occur please do not hesitate to call our office.

## 2025-06-24 DIAGNOSIS — M51.369 DDD (DEGENERATIVE DISC DISEASE), LUMBAR: ICD-10-CM

## 2025-06-24 DIAGNOSIS — L29.9 PRURITIC CONDITION: ICD-10-CM

## 2025-06-24 DIAGNOSIS — E11.40 TYPE 2 DIABETES MELLITUS WITH DIABETIC NEUROPATHY, WITHOUT LONG-TERM CURRENT USE OF INSULIN: ICD-10-CM

## 2025-06-24 DIAGNOSIS — R79.89 ELEVATED TSH: ICD-10-CM

## 2025-06-24 RX ORDER — LEVOTHYROXINE SODIUM 50 UG/1
50 TABLET ORAL DAILY
Qty: 90 TABLET | Refills: 1 | Status: SHIPPED | OUTPATIENT
Start: 2025-06-24

## 2025-06-24 RX ORDER — GABAPENTIN 300 MG/1
600 CAPSULE ORAL
Qty: 180 CAPSULE | Refills: 1 | Status: SHIPPED | OUTPATIENT
Start: 2025-06-24

## 2025-06-24 RX ORDER — HYDROXYZINE HYDROCHLORIDE 25 MG/1
25 TABLET, FILM COATED ORAL EVERY 6 HOURS PRN
Qty: 90 TABLET | Refills: 3 | Status: SHIPPED | OUTPATIENT
Start: 2025-06-24

## 2025-07-23 ENCOUNTER — EXTERNAL PBMM DATA (OUTPATIENT)
Dept: PHARMACY | Facility: OTHER | Age: 70
End: 2025-07-23
Payer: MEDICARE

## 2025-07-25 ENCOUNTER — TELEPHONE (OUTPATIENT)
Dept: FAMILY MEDICINE CLINIC | Facility: CLINIC | Age: 70
End: 2025-07-25
Payer: MEDICARE

## 2025-07-25 NOTE — TELEPHONE ENCOUNTER
Insurance requiring a PA for Nystatin but there is no DX code attached to the prescription. Will need one before I can submit the PA.

## 2025-07-27 NOTE — TELEPHONE ENCOUNTER
patient has had recurrent candidal dermatitis going back to at least 2020 for which she has used nystatin powder on an intermittent basis.  Please see if this is enough information to get the PA.

## 2025-08-04 ENCOUNTER — OFFICE VISIT (OUTPATIENT)
Dept: PAIN MEDICINE | Facility: CLINIC | Age: 70
End: 2025-08-04
Payer: MEDICARE

## 2025-08-04 VITALS
WEIGHT: 165 LBS | OXYGEN SATURATION: 97 % | HEART RATE: 78 BPM | RESPIRATION RATE: 16 BRPM | DIASTOLIC BLOOD PRESSURE: 73 MMHG | BODY MASS INDEX: 30.18 KG/M2 | SYSTOLIC BLOOD PRESSURE: 142 MMHG

## 2025-08-04 DIAGNOSIS — Z79.899 HIGH RISK MEDICATION USE: ICD-10-CM

## 2025-08-04 DIAGNOSIS — M47.814 THORACIC SPONDYLOSIS WITHOUT MYELOPATHY: ICD-10-CM

## 2025-08-04 DIAGNOSIS — M25.50 POLYARTHRALGIA: ICD-10-CM

## 2025-08-04 DIAGNOSIS — M47.817 LUMBOSACRAL SPONDYLOSIS WITHOUT MYELOPATHY: Primary | ICD-10-CM

## 2025-08-04 PROCEDURE — 3078F DIAST BP <80 MM HG: CPT | Performed by: ANESTHESIOLOGY

## 2025-08-04 PROCEDURE — 1125F AMNT PAIN NOTED PAIN PRSNT: CPT | Performed by: ANESTHESIOLOGY

## 2025-08-04 PROCEDURE — 99214 OFFICE O/P EST MOD 30 MIN: CPT | Performed by: ANESTHESIOLOGY

## 2025-08-04 PROCEDURE — 3077F SYST BP >= 140 MM HG: CPT | Performed by: ANESTHESIOLOGY

## 2025-08-04 PROCEDURE — G2211 COMPLEX E/M VISIT ADD ON: HCPCS | Performed by: ANESTHESIOLOGY

## 2025-08-04 RX ORDER — HYDROCODONE BITARTRATE AND ACETAMINOPHEN 7.5; 325 MG/1; MG/1
1 TABLET ORAL 4 TIMES DAILY PRN
Qty: 120 TABLET | Refills: 0 | Status: SHIPPED | OUTPATIENT
Start: 2025-08-04

## 2025-08-04 RX ORDER — HYDROCODONE BITARTRATE AND ACETAMINOPHEN 7.5; 325 MG/1; MG/1
1 TABLET ORAL 4 TIMES DAILY PRN
Qty: 120 TABLET | Refills: 0 | Status: SHIPPED | OUTPATIENT
Start: 2025-09-03

## 2025-08-18 DIAGNOSIS — K21.9 GASTROESOPHAGEAL REFLUX DISEASE WITHOUT ESOPHAGITIS: ICD-10-CM

## 2025-08-18 RX ORDER — OMEPRAZOLE 20 MG/1
20 CAPSULE, DELAYED RELEASE ORAL DAILY
Qty: 90 CAPSULE | Refills: 3 | Status: SHIPPED | OUTPATIENT
Start: 2025-08-18

## 2025-08-20 ENCOUNTER — OFFICE VISIT (OUTPATIENT)
Dept: FAMILY MEDICINE CLINIC | Facility: CLINIC | Age: 70
End: 2025-08-20
Payer: MEDICARE

## 2025-08-20 VITALS
DIASTOLIC BLOOD PRESSURE: 74 MMHG | RESPIRATION RATE: 18 BRPM | TEMPERATURE: 97.3 F | SYSTOLIC BLOOD PRESSURE: 134 MMHG | HEART RATE: 89 BPM | OXYGEN SATURATION: 99 % | WEIGHT: 162.6 LBS | BODY MASS INDEX: 29.92 KG/M2 | HEIGHT: 62 IN

## 2025-08-20 DIAGNOSIS — E11.40 TYPE 2 DIABETES MELLITUS WITH DIABETIC NEUROPATHY, WITHOUT LONG-TERM CURRENT USE OF INSULIN: Primary | ICD-10-CM

## 2025-08-20 DIAGNOSIS — Z87.891 FORMER SMOKER: ICD-10-CM

## 2025-08-20 DIAGNOSIS — N28.9 RENAL INSUFFICIENCY: ICD-10-CM

## 2025-08-20 DIAGNOSIS — E55.9 VITAMIN D DEFICIENCY: ICD-10-CM

## 2025-08-20 DIAGNOSIS — I10 HYPERTENSION, ESSENTIAL: ICD-10-CM

## 2025-08-20 DIAGNOSIS — E03.9 ACQUIRED HYPOTHYROIDISM: ICD-10-CM

## 2025-08-20 DIAGNOSIS — E78.2 MIXED HYPERLIPIDEMIA: ICD-10-CM

## 2025-08-20 DIAGNOSIS — E66.3 OVERWEIGHT WITH BODY MASS INDEX (BMI) OF 29 TO 29.9 IN ADULT: ICD-10-CM

## 2025-08-20 DIAGNOSIS — K59.00 CONSTIPATION, UNSPECIFIED CONSTIPATION TYPE: ICD-10-CM

## 2025-08-20 RX ORDER — POLYETHYLENE GLYCOL 3350 17 G/17G
17 POWDER, FOR SOLUTION ORAL DAILY
Qty: 578 G | Refills: 3 | Status: SHIPPED | OUTPATIENT
Start: 2025-08-20

## 2025-08-20 RX ORDER — OXYCODONE HYDROCHLORIDE 5 MG/1
1 TABLET ORAL
COMMUNITY
Start: 2025-05-28 | End: 2025-08-20

## 2025-08-20 RX ORDER — ATORVASTATIN CALCIUM 10 MG/1
10 TABLET, FILM COATED ORAL DAILY
Qty: 90 TABLET | Refills: 3 | Status: SHIPPED | OUTPATIENT
Start: 2025-08-20

## 2025-08-29 ENCOUNTER — TELEPHONE (OUTPATIENT)
Dept: FAMILY MEDICINE CLINIC | Facility: CLINIC | Age: 70
End: 2025-08-29
Payer: MEDICARE

## 2025-08-29 DIAGNOSIS — Z91.038 ALLERGY TO INSECT STINGS: Primary | ICD-10-CM

## 2025-08-29 RX ORDER — EPINEPHRINE 0.3 MG/.3ML
0.3 INJECTION SUBCUTANEOUS ONCE
Qty: 1 EACH | Refills: 0 | Status: SHIPPED | OUTPATIENT
Start: 2025-08-29 | End: 2025-08-29